# Patient Record
Sex: FEMALE | Race: WHITE
[De-identification: names, ages, dates, MRNs, and addresses within clinical notes are randomized per-mention and may not be internally consistent; named-entity substitution may affect disease eponyms.]

---

## 2018-02-02 ENCOUNTER — HOSPITAL ENCOUNTER (OUTPATIENT)
Dept: HOSPITAL 61 - INTRAD | Age: 73
Discharge: HOME | End: 2018-02-02
Attending: RADIOLOGY
Payer: MEDICARE

## 2018-02-02 ENCOUNTER — HOSPITAL ENCOUNTER (OUTPATIENT)
Dept: HOSPITAL 61 - SURG | Age: 73
Discharge: HOME | End: 2018-02-02
Attending: ANESTHESIOLOGY
Payer: MEDICARE

## 2018-02-02 DIAGNOSIS — E78.5: ICD-10-CM

## 2018-02-02 DIAGNOSIS — Z87.39: ICD-10-CM

## 2018-02-02 DIAGNOSIS — Z86.69: ICD-10-CM

## 2018-02-02 DIAGNOSIS — J44.9: ICD-10-CM

## 2018-02-02 DIAGNOSIS — K21.9: ICD-10-CM

## 2018-02-02 DIAGNOSIS — F32.9: ICD-10-CM

## 2018-02-02 DIAGNOSIS — I95.89: ICD-10-CM

## 2018-02-02 DIAGNOSIS — Z95.5: ICD-10-CM

## 2018-02-02 DIAGNOSIS — Z86.39: ICD-10-CM

## 2018-02-02 DIAGNOSIS — F17.200: ICD-10-CM

## 2018-02-02 DIAGNOSIS — Z79.899: ICD-10-CM

## 2018-02-02 DIAGNOSIS — E78.00: ICD-10-CM

## 2018-02-02 DIAGNOSIS — M48.56XD: Primary | ICD-10-CM

## 2018-02-02 DIAGNOSIS — Z88.8: ICD-10-CM

## 2018-02-02 DIAGNOSIS — M54.5: ICD-10-CM

## 2018-02-02 DIAGNOSIS — Z91.040: ICD-10-CM

## 2018-02-02 DIAGNOSIS — Z90.49: ICD-10-CM

## 2018-02-02 DIAGNOSIS — Z88.1: ICD-10-CM

## 2018-02-02 DIAGNOSIS — I25.10: ICD-10-CM

## 2018-02-02 DIAGNOSIS — M48.56XA: Primary | ICD-10-CM

## 2018-02-02 DIAGNOSIS — I10: ICD-10-CM

## 2018-02-02 DIAGNOSIS — E11.9: ICD-10-CM

## 2018-02-02 DIAGNOSIS — Z79.82: ICD-10-CM

## 2018-02-02 DIAGNOSIS — Z79.01: ICD-10-CM

## 2018-02-02 DIAGNOSIS — Z98.890: ICD-10-CM

## 2018-02-02 DIAGNOSIS — E11.42: ICD-10-CM

## 2018-02-02 DIAGNOSIS — K57.30: ICD-10-CM

## 2018-02-02 DIAGNOSIS — M47.896: ICD-10-CM

## 2018-02-02 DIAGNOSIS — Z90.710: ICD-10-CM

## 2018-02-02 LAB
ADD MAN DIFF?: NO
BASO #: 0.1 X10^3/UL (ref 0–0.2)
BASO %: 1 % (ref 0–3)
EOS #: 0.2 X10^3/UL (ref 0–0.7)
EOS %: 2 % (ref 0–3)
HCG SERPL-ACNC: 7.5 X10^3/UL (ref 4–11)
HEMATOCRIT: 42 % (ref 36–47)
HEMOGLOBIN: 14 G/DL (ref 12–15.5)
INR: 1 (ref 0.8–1.1)
LYMPH #: 2.9 X10^3/UL (ref 1–4.8)
LYMPH %: 38 % (ref 24–48)
MEAN CORPUSCULAR HEMOGLOBIN: 30 PG (ref 25–35)
MEAN CORPUSCULAR HGB CONC: 33 G/DL (ref 31–37)
MEAN CORPUSCULAR VOLUME: 90 FL (ref 79–100)
MONO #: 0.8 X10^3/UL (ref 0–1.1)
MONO %: 10 % (ref 0–9)
NEUT #: 3.6 X10^3UL (ref 1.8–7.7)
NEUT %: 48 % (ref 31–73)
PLATELET COUNT: 191 X10^3/UL (ref 140–400)
PROTHROMBIN TIME PATIENT: 12.6 SEC (ref 11.7–14)
RED BLOOD COUNT: 4.69 X10^6/UL (ref 3.5–5.4)
RED CELL DISTRIBUTION WIDTH: 13.1 % (ref 11.5–14.5)

## 2018-02-02 PROCEDURE — 99152 MOD SED SAME PHYS/QHP 5/>YRS: CPT

## 2018-02-02 PROCEDURE — 22514 PERQ VERTEBRAL AUGMENTATION: CPT

## 2018-02-02 PROCEDURE — 36415 COLL VENOUS BLD VENIPUNCTURE: CPT

## 2018-02-02 PROCEDURE — 85025 COMPLETE CBC W/AUTO DIFF WBC: CPT

## 2018-02-02 PROCEDURE — 85610 PROTHROMBIN TIME: CPT

## 2018-02-02 PROCEDURE — 99153 MOD SED SAME PHYS/QHP EA: CPT

## 2018-02-02 RX ADMIN — IOHEXOL 1 ML: 240 INJECTION, SOLUTION INTRATHECAL; INTRAVASCULAR; INTRAVENOUS; ORAL at 12:45

## 2018-02-02 RX ADMIN — SODIUM CHLORIDE, SODIUM LACTATE, POTASSIUM CHLORIDE, AND CALCIUM CHLORIDE 1 MLS/HR: .6; .31; .03; .02 INJECTION, SOLUTION INTRAVENOUS at 07:00

## 2018-02-02 RX ADMIN — VANCOMYCIN HYDROCHLORIDE 1 MLS/HR: 1 INJECTION, POWDER, FOR SOLUTION INTRAVENOUS at 12:11

## 2018-02-02 RX ADMIN — LIDOCAINE HYDROCHLORIDE 1 ML: 10 INJECTION, SOLUTION INFILTRATION; PERINEURAL at 12:45

## 2018-04-05 ENCOUNTER — HOSPITAL ENCOUNTER (OUTPATIENT)
Dept: HOSPITAL 61 - KCIC MRI | Age: 73
Discharge: HOME | End: 2018-04-05
Payer: MEDICARE

## 2018-04-05 DIAGNOSIS — M48.061: ICD-10-CM

## 2018-04-05 DIAGNOSIS — M25.78: ICD-10-CM

## 2018-04-05 DIAGNOSIS — M48.56XD: Primary | ICD-10-CM

## 2018-04-05 DIAGNOSIS — M47.896: ICD-10-CM

## 2018-04-05 DIAGNOSIS — M51.37: ICD-10-CM

## 2018-04-05 DIAGNOSIS — Z91.81: ICD-10-CM

## 2018-04-05 PROCEDURE — 72148 MRI LUMBAR SPINE W/O DYE: CPT

## 2018-09-13 ENCOUNTER — HOSPITAL ENCOUNTER (INPATIENT)
Dept: HOSPITAL 61 - 2 NORTH | Age: 73
LOS: 5 days | Discharge: HOME | DRG: 246 | End: 2018-09-18
Attending: FAMILY MEDICINE | Admitting: FAMILY MEDICINE
Payer: MEDICARE

## 2018-09-13 VITALS — DIASTOLIC BLOOD PRESSURE: 62 MMHG | SYSTOLIC BLOOD PRESSURE: 140 MMHG

## 2018-09-13 VITALS — WEIGHT: 181.25 LBS | HEIGHT: 63 IN | BODY MASS INDEX: 32.11 KG/M2

## 2018-09-13 VITALS — DIASTOLIC BLOOD PRESSURE: 77 MMHG | SYSTOLIC BLOOD PRESSURE: 170 MMHG

## 2018-09-13 DIAGNOSIS — M19.90: ICD-10-CM

## 2018-09-13 DIAGNOSIS — F32.9: ICD-10-CM

## 2018-09-13 DIAGNOSIS — J96.10: ICD-10-CM

## 2018-09-13 DIAGNOSIS — N18.9: ICD-10-CM

## 2018-09-13 DIAGNOSIS — J44.9: ICD-10-CM

## 2018-09-13 DIAGNOSIS — Z88.0: ICD-10-CM

## 2018-09-13 DIAGNOSIS — Z90.89: ICD-10-CM

## 2018-09-13 DIAGNOSIS — Z91.040: ICD-10-CM

## 2018-09-13 DIAGNOSIS — I50.21: ICD-10-CM

## 2018-09-13 DIAGNOSIS — E46: ICD-10-CM

## 2018-09-13 DIAGNOSIS — I25.2: ICD-10-CM

## 2018-09-13 DIAGNOSIS — Z88.8: ICD-10-CM

## 2018-09-13 DIAGNOSIS — E78.5: ICD-10-CM

## 2018-09-13 DIAGNOSIS — Z95.5: ICD-10-CM

## 2018-09-13 DIAGNOSIS — R79.1: ICD-10-CM

## 2018-09-13 DIAGNOSIS — I25.110: Primary | ICD-10-CM

## 2018-09-13 DIAGNOSIS — I12.9: ICD-10-CM

## 2018-09-13 DIAGNOSIS — Z90.710: ICD-10-CM

## 2018-09-13 DIAGNOSIS — E66.01: ICD-10-CM

## 2018-09-13 DIAGNOSIS — F17.210: ICD-10-CM

## 2018-09-13 DIAGNOSIS — K21.9: ICD-10-CM

## 2018-09-13 DIAGNOSIS — Z90.49: ICD-10-CM

## 2018-09-13 LAB — PROTHROMBIN TIME: 12 SEC (ref 11.7–14)

## 2018-09-13 PROCEDURE — 78582 LUNG VENTILAT&PERFUS IMAGING: CPT

## 2018-09-13 PROCEDURE — 82962 GLUCOSE BLOOD TEST: CPT

## 2018-09-13 PROCEDURE — 78452 HT MUSCLE IMAGE SPECT MULT: CPT

## 2018-09-13 PROCEDURE — 36415 COLL VENOUS BLD VENIPUNCTURE: CPT

## 2018-09-13 PROCEDURE — 92928 PRQ TCAT PLMT NTRAC ST 1 LES: CPT

## 2018-09-13 PROCEDURE — 99152 MOD SED SAME PHYS/QHP 5/>YRS: CPT

## 2018-09-13 PROCEDURE — 96376 TX/PRO/DX INJ SAME DRUG ADON: CPT

## 2018-09-13 PROCEDURE — C1725 CATH, TRANSLUMIN NON-LASER: HCPCS

## 2018-09-13 PROCEDURE — 80048 BASIC METABOLIC PNL TOTAL CA: CPT

## 2018-09-13 PROCEDURE — 85520 HEPARIN ASSAY: CPT

## 2018-09-13 PROCEDURE — 93970 EXTREMITY STUDY: CPT

## 2018-09-13 PROCEDURE — G0269 OCCLUSIVE DEVICE IN VEIN ART: HCPCS

## 2018-09-13 PROCEDURE — A9500 TC99M SESTAMIBI: HCPCS

## 2018-09-13 PROCEDURE — 94640 AIRWAY INHALATION TREATMENT: CPT

## 2018-09-13 PROCEDURE — 84443 ASSAY THYROID STIM HORMONE: CPT

## 2018-09-13 PROCEDURE — 99153 MOD SED SAME PHYS/QHP EA: CPT

## 2018-09-13 PROCEDURE — C1892 INTRO/SHEATH,FIXED,PEEL-AWAY: HCPCS

## 2018-09-13 PROCEDURE — 93458 L HRT ARTERY/VENTRICLE ANGIO: CPT

## 2018-09-13 PROCEDURE — C1771 REP DEV, URINARY, W/SLING: HCPCS

## 2018-09-13 PROCEDURE — 80061 LIPID PANEL: CPT

## 2018-09-13 PROCEDURE — 80053 COMPREHEN METABOLIC PANEL: CPT

## 2018-09-13 PROCEDURE — 83735 ASSAY OF MAGNESIUM: CPT

## 2018-09-13 PROCEDURE — 85610 PROTHROMBIN TIME: CPT

## 2018-09-13 PROCEDURE — 93005 ELECTROCARDIOGRAM TRACING: CPT

## 2018-09-13 PROCEDURE — C1769 GUIDE WIRE: HCPCS

## 2018-09-13 PROCEDURE — C1887 CATHETER, GUIDING: HCPCS

## 2018-09-13 PROCEDURE — 96375 TX/PRO/DX INJ NEW DRUG ADDON: CPT

## 2018-09-13 PROCEDURE — A9558 XE133 XENON 10MCI: HCPCS

## 2018-09-13 PROCEDURE — 85025 COMPLETE CBC W/AUTO DIFF WBC: CPT

## 2018-09-13 PROCEDURE — 84484 ASSAY OF TROPONIN QUANT: CPT

## 2018-09-13 PROCEDURE — 94760 N-INVAS EAR/PLS OXIMETRY 1: CPT

## 2018-09-13 PROCEDURE — 71275 CT ANGIOGRAPHY CHEST: CPT

## 2018-09-13 PROCEDURE — 93017 CV STRESS TEST TRACING ONLY: CPT

## 2018-09-13 PROCEDURE — A9540 TC99M MAA: HCPCS

## 2018-09-13 PROCEDURE — 96374 THER/PROPH/DIAG INJ IV PUSH: CPT

## 2018-09-13 PROCEDURE — 93306 TTE W/DOPPLER COMPLETE: CPT

## 2018-09-13 RX ADMIN — CARVEDILOL SCH MG: 6.25 TABLET, FILM COATED ORAL at 18:16

## 2018-09-13 RX ADMIN — GABAPENTIN SCH MG: 300 CAPSULE ORAL at 21:34

## 2018-09-13 RX ADMIN — SENNOSIDES AND DOCUSATE SODIUM SCH TAB: 8.6; 5 TABLET ORAL at 21:36

## 2018-09-13 RX ADMIN — TRAZODONE HYDROCHLORIDE SCH MG: 100 TABLET ORAL at 21:35

## 2018-09-13 RX ADMIN — FLUTICASONE PROPIONATE SCH SPRAY: 50 SPRAY, METERED NASAL at 21:34

## 2018-09-13 RX ADMIN — DICLOFENAC SODIUM SCH MG: 25 TABLET, DELAYED RELEASE ORAL at 21:35

## 2018-09-13 RX ADMIN — ATORVASTATIN CALCIUM SCH MG: 40 TABLET, FILM COATED ORAL at 21:36

## 2018-09-13 RX ADMIN — IPRATROPIUM BROMIDE AND ALBUTEROL SULFATE SCH ML: .5; 3 SOLUTION RESPIRATORY (INHALATION) at 19:46

## 2018-09-13 RX ADMIN — BACITRACIN SCH MLS/HR: 5000 INJECTION, POWDER, FOR SOLUTION INTRAMUSCULAR at 18:17

## 2018-09-13 RX ADMIN — DOCUSATE SODIUM SCH MG: 100 CAPSULE, LIQUID FILLED ORAL at 21:35

## 2018-09-13 RX ADMIN — NITROGLYCERIN SCH INCH: 20 OINTMENT TOPICAL at 18:17

## 2018-09-13 RX ADMIN — NITROGLYCERIN SCH INCH: 20 OINTMENT TOPICAL at 23:35

## 2018-09-13 NOTE — RAD
Ultrasound venous Doppler

 

INDICATION:LEFT CALF PAIN

 

TECHNIQUE: Grayscale, color Doppler and spectral waveform ultrasound 

images of the bilateral lower extremities deep veins obtained.

 

COMPARISON: None

 

FINDINGS:

The interrogated deep veins are compressible and demonstrate evidence of 

blood flow with normal respiratory variation and response to augmentation.

 

IMPRESSION:

No sonographic evidence of acute DVT of the bilateral lower extremity deep

veins.

 

Electronically signed by: Jeffrey Rm DO (9/13/2018 7:05 PM) [facility]

## 2018-09-13 NOTE — PDOC1
History and Physical


Date of Admission


Date of Admission


DATE: 9/13/18 


TIME: 17:19





Identification/Chief Complaint


Chief Complaint


seen at [facility] ER with chest tightness x 1 week, has known hx CAD/ STENTS, 

followed by [facility] cardiology.





still smokes lightly, hx hx asthma/ copd, transferred here for cardiology eval, 

stat v/q, and doppler of legs





notes pain radiates to neck, is better at this time, some pain in left calf, no 

known hx dvt's





Past Medical History


Cardiovascular:  CAD, HTN, MI


GI:  GERD


Musculoskeletal:  Osteoarthritis


Renal/:  Chronic renal insuff





Past Surgical History


Past Surgical History:  Appendectomy, Cholecystectomy, Tonsillectomy, 

Hysterectomy





Family History


Family History:  High Cholestrol





Social History


Smoke:  <1 pack per day


ALCOHOL:  occassional


Drugs:  None





Current Medications


Current Medications





Active Scripts


Active


Reported


Duoneb 0.5-3(2.5) Mg/3 Ml (Albuterol/Ipratropium) 3 Ml Ampul.neb 3 Ml NEB TID 

PRN PRN


Diphenhydramine Hcl 50 Mg Capsule 25 Mg PO Q4-6HRS PRN


Carvedilol 6.25 Mg Tablet 1 Tab PO BID


Vitamin D3 (Cholecalciferol (Vitamin D3)) 1,000 Unit Tablet 2,000 Unit PO DAILY


Vitamin B-12 (Cyanocobalamin (Vitamin B-12)) 1,000 Mcg Tablet 5,000 Mcg PO DAILY


Omeprazole 40 Mg Capsule.dr 40 Mg PO DAILY


Atorvastatin Calcium 80 Mg Tablet 80 Mg PO HS


Fluticasone Propionate Nasal Spray (Fluticasone Propionate) 16 Gm Spray.susp 1 

Spray NS BID


Trazodone Hcl 100 Mg Tablet 1 Tab PO QHS


Spiriva (Tiotropium Bromide) 18 Mcg Cap.w.dev 1 Cap IH DAILY


Losartan Potassium 50 Mg Tablet 50 Mg PO DAILY


Gabapentin 600 Mg Tablet 600 Mg PO TID


Celexa (Citalopram Hydrobromide) 10 Mg Tablet 1 Tab PO DAILY


Aspir 81 (Aspirin) 81 Mg Tablet.dr 1 Tab PO DAILY





Allergies


Allergies:  


Coded Allergies:  


     ampicillin (Verified  Allergy, Intermediate, 3/16/16)


     cyclobenzaprine (Verified  Allergy, Intermediate, 3/16/16)


     latex (Verified  Allergy, Intermediate, 3/16/16)


     pravastatin (Verified  Allergy, Intermediate, 1/25/18)





ROS


Review of System


14 pt ros otherwise neg


General:  No: Chills, Night Sweats, Fatigue, Malaise, Appetite, Other


PSYCHOLOGICAL ROS:  No: Anxiety, Behavioral Disorder, Concentration difficultie

, Decreased libido, Depression, Disorientation, Hallucinations, Hostility, 

Irritablity, Memory difficulties, Mood Swings, Obsessive thoughts, Physical 

abuse, Sexual abuse, Sleep disturbances, Suicidal ideation, Other


Eyes:  No Blurry vision, No Decreased vision, No Double vision, No Dry eyes, No 

Excessive tearing, No Eye Pain, No Itchy Eyes, No Loss of vision, No Photophobia

, No Scotomata, No Uses contacts, No Uses glasses, No Other


HEENT:  YES: Hearing change; 


   No: Heacaches, Visual Changes, Nasal congestion, Nasal discharge, Oral 

lesions, Sinus pain, Sore Throat, Epistaxis, Sneezing, Snoring, Tinnitus, 

Vertigo, Vocal changes, Other


ALLERGY AND IMMUNOLOGY:  No: Hives, Insect Bite Sensitivity, Itchy/Watery Eyes, 

Nasal Congestion, Post Nasal Drip, Seasonal Allergies, Other


Hematological and Lymphatic:  No: Bleeding Problems, Blood Clots, Blood 

Transfusions, Brusing, Night Sweats, Pallor, Swollen Lymph Nodes, Other


ENDOCRINE:  No: Breast Changes, Galactorrhea, Hair Pattern Changes, Hot Flashes

, Malaise/lethargy, Mood Swings, Palpitations, Polydipsia/polyuria, Skin Changes

, Temperature Intolerance, Unexpected Weight Changes, Other


Respiratory:  YES: Cough, Shortness of breath, SOB with excertion


Cardiovascular:  yes Chest Pain


Musculoskeletal:  Yes Joint Stiffness


Skin:  No Dry Skin, No Eczema, No Hair Changes, No Lumps, No Mole Changes, No 

Mottling, No Nail Changes, No Pruritus, No Rash, No Skin Lesion Changes, No 

Other, No Acne





Physical Exam


General:  Alert, Oriented X3, Cooperative


HEENT:  Atraumatic, PERRLA


Lungs:  Clear to auscultation


Heart:  S1S2, RRR


Breasts:  Not examined


Abdomen:  Normal bowel sounds, Soft


Rectal Exam:  not examined


PELVIC:  Examination not indicated


Extremities:  No clubbing, No cyanosis, Other (trace left leg edema)


Neuro:  Normal gait, Normal speech, Cranial nerves 3-12 NL


Psych/Mental Status:  Mental status NL, Mood NL





VTE Prophylaxis Ordered


VTE Prophylaxis Devices:  Yes


VTE Pharmacological Prophylaxi:  Yes





Assessment/Plan


Assessment/Plan


impression





1. chest pain with known CAD


2. HTN


3, GERD


4. COPD


5. HX ASTHMA


6. HX depression


7. elevated d-dimer








plan


1. cvc admit


2. cardiology consult


3. stat vq scan


4, statin


5. sq lovenox 70mg sq bid


6, gi prophylaxis


7. jorge troponin i


8. stat venous doppler both legs











ELISHA SCOTT MD Sep 13, 2018 17:20

## 2018-09-13 NOTE — RAD
Indication: Chest pain for one week. Positive d-dimer.

 

TECHNIQUE: Nuclear medicine VQ scan with 20 mCi of xenon-133 for 

ventilation imaging and 6.3 mCi of technetium 99m MAA for perfusion 

imaging.

 

COMPARISON: None

 

FINDINGS:

The ventilation exam demonstrates homogeneous distribution of the 

xenon-133 with appropriate washout and washout.

Multiple wedge-shaped areas of ventilation/perfusion mismatch is seen in 

both lungs.

 

IMPRESSION:

High probability VQ scan. CT angiogram of the chest is strongly 

recommended.

 

Electronically signed by: Jeffrey Rm DO (9/13/2018 8:56 PM) [facility]

## 2018-09-14 VITALS — DIASTOLIC BLOOD PRESSURE: 76 MMHG | SYSTOLIC BLOOD PRESSURE: 154 MMHG

## 2018-09-14 VITALS — DIASTOLIC BLOOD PRESSURE: 52 MMHG | SYSTOLIC BLOOD PRESSURE: 123 MMHG

## 2018-09-14 VITALS — DIASTOLIC BLOOD PRESSURE: 72 MMHG | SYSTOLIC BLOOD PRESSURE: 173 MMHG

## 2018-09-14 VITALS — DIASTOLIC BLOOD PRESSURE: 77 MMHG | SYSTOLIC BLOOD PRESSURE: 181 MMHG

## 2018-09-14 VITALS — DIASTOLIC BLOOD PRESSURE: 51 MMHG | SYSTOLIC BLOOD PRESSURE: 111 MMHG

## 2018-09-14 VITALS — SYSTOLIC BLOOD PRESSURE: 100 MMHG | DIASTOLIC BLOOD PRESSURE: 41 MMHG

## 2018-09-14 VITALS — SYSTOLIC BLOOD PRESSURE: 99 MMHG | DIASTOLIC BLOOD PRESSURE: 43 MMHG

## 2018-09-14 VITALS — SYSTOLIC BLOOD PRESSURE: 145 MMHG | DIASTOLIC BLOOD PRESSURE: 37 MMHG

## 2018-09-14 LAB
ALBUMIN SERPL-MCNC: 2.6 G/DL (ref 3.4–5)
ALBUMIN SERPL-MCNC: 3.1 G/DL (ref 3.4–5)
ALBUMIN/GLOB SERPL: 0.8 {RATIO} (ref 1–1.7)
ALBUMIN/GLOB SERPL: 0.9 {RATIO} (ref 1–1.7)
ALP SERPL-CCNC: 77 U/L (ref 46–116)
ALP SERPL-CCNC: 90 U/L (ref 46–116)
ALT SERPL-CCNC: 25 U/L (ref 14–59)
ALT SERPL-CCNC: 29 U/L (ref 14–59)
ANION GAP SERPL CALC-SCNC: 4 MMOL/L (ref 6–14)
ANION GAP SERPL CALC-SCNC: 5 MMOL/L (ref 6–14)
AST SERPL-CCNC: 17 U/L (ref 15–37)
AST SERPL-CCNC: 24 U/L (ref 15–37)
BASOPHILS # BLD AUTO: 0.1 X10^3/UL (ref 0–0.2)
BASOPHILS NFR BLD: 1 % (ref 0–3)
BILIRUB SERPL-MCNC: 0.2 MG/DL (ref 0.2–1)
BILIRUB SERPL-MCNC: 0.3 MG/DL (ref 0.2–1)
BUN SERPL-MCNC: 21 MG/DL (ref 7–20)
BUN SERPL-MCNC: 23 MG/DL (ref 7–20)
BUN/CREAT SERPL: 21 (ref 6–20)
BUN/CREAT SERPL: 23 (ref 6–20)
CALCIUM SERPL-MCNC: 8.5 MG/DL (ref 8.5–10.1)
CALCIUM SERPL-MCNC: 8.8 MG/DL (ref 8.5–10.1)
CHLORIDE SERPL-SCNC: 105 MMOL/L (ref 98–107)
CHLORIDE SERPL-SCNC: 106 MMOL/L (ref 98–107)
CHOLEST SERPL-MCNC: 176 MG/DL (ref 0–200)
CHOLEST/HDLC SERPL: 4.5 {RATIO}
CO2 SERPL-SCNC: 32 MMOL/L (ref 21–32)
CO2 SERPL-SCNC: 34 MMOL/L (ref 21–32)
CREAT SERPL-MCNC: 1 MG/DL (ref 0.6–1)
CREAT SERPL-MCNC: 1 MG/DL (ref 0.6–1)
EOSINOPHIL NFR BLD: 0.3 X10^3/UL (ref 0–0.7)
EOSINOPHIL NFR BLD: 3 % (ref 0–3)
ERYTHROCYTE [DISTWIDTH] IN BLOOD BY AUTOMATED COUNT: 13.3 % (ref 11.5–14.5)
GFR SERPLBLD BASED ON 1.73 SQ M-ARVRAT: 54.7 ML/MIN
GFR SERPLBLD BASED ON 1.73 SQ M-ARVRAT: 54.7 ML/MIN
GLOBULIN SER-MCNC: 3.4 G/DL (ref 2.2–3.8)
GLOBULIN SER-MCNC: 3.5 G/DL (ref 2.2–3.8)
GLUCOSE SERPL-MCNC: 131 MG/DL (ref 70–99)
GLUCOSE SERPL-MCNC: 141 MG/DL (ref 70–99)
HCT VFR BLD CALC: 35.6 % (ref 36–47)
HDLC SERPL-MCNC: 39 MG/DL (ref 40–60)
HGB BLD-MCNC: 12 G/DL (ref 12–15.5)
LDLC: 98 MG/DL (ref 0–100)
LYMPHOCYTES # BLD: 4 X10^3/UL (ref 1–4.8)
LYMPHOCYTES NFR BLD AUTO: 43 % (ref 24–48)
MCH RBC QN AUTO: 31 PG (ref 25–35)
MCHC RBC AUTO-ENTMCNC: 34 G/DL (ref 31–37)
MCV RBC AUTO: 91 FL (ref 79–100)
MONO #: 1 X10^3/UL (ref 0–1.1)
MONOCYTES NFR BLD: 10 % (ref 0–9)
NEUT #: 4 X10^3UL (ref 1.8–7.7)
NEUTROPHILS NFR BLD AUTO: 42 % (ref 31–73)
PLATELET # BLD AUTO: 202 X10^3/UL (ref 140–400)
POTASSIUM SERPL-SCNC: 4.8 MMOL/L (ref 3.5–5.1)
POTASSIUM SERPL-SCNC: 4.8 MMOL/L (ref 3.5–5.1)
PROT SERPL-MCNC: 6 G/DL (ref 6.4–8.2)
PROT SERPL-MCNC: 6.6 G/DL (ref 6.4–8.2)
RBC # BLD AUTO: 3.92 X10^6/UL (ref 3.5–5.4)
SODIUM SERPL-SCNC: 142 MMOL/L (ref 136–145)
SODIUM SERPL-SCNC: 144 MMOL/L (ref 136–145)
TRIGL SERPL-MCNC: 193 MG/DL (ref 0–150)
VLDLC: 39 MG/DL (ref 0–40)
WBC # BLD AUTO: 9.4 X10^3/UL (ref 4–11)

## 2018-09-14 PROCEDURE — B2111ZZ FLUOROSCOPY OF MULTIPLE CORONARY ARTERIES USING LOW OSMOLAR CONTRAST: ICD-10-PCS | Performed by: INTERNAL MEDICINE

## 2018-09-14 PROCEDURE — B2151ZZ FLUOROSCOPY OF LEFT HEART USING LOW OSMOLAR CONTRAST: ICD-10-PCS | Performed by: INTERNAL MEDICINE

## 2018-09-14 PROCEDURE — 4A023N7 MEASUREMENT OF CARDIAC SAMPLING AND PRESSURE, LEFT HEART, PERCUTANEOUS APPROACH: ICD-10-PCS | Performed by: INTERNAL MEDICINE

## 2018-09-14 RX ADMIN — LOSARTAN POTASSIUM SCH MG: 50 TABLET ORAL at 09:00

## 2018-09-14 RX ADMIN — BACITRACIN SCH MLS/HR: 5000 INJECTION, POWDER, FOR SOLUTION INTRAMUSCULAR at 03:30

## 2018-09-14 RX ADMIN — GABAPENTIN SCH MG: 300 CAPSULE ORAL at 20:35

## 2018-09-14 RX ADMIN — HYDROCODONE BITARTRATE AND ACETAMINOPHEN PRN TAB: 5; 325 TABLET ORAL at 20:36

## 2018-09-14 RX ADMIN — MORPHINE SULFATE PRN MG: 2 INJECTION, SOLUTION INTRAMUSCULAR; INTRAVENOUS at 01:56

## 2018-09-14 RX ADMIN — NITROGLYCERIN SCH INCH: 20 OINTMENT TOPICAL at 22:59

## 2018-09-14 RX ADMIN — NITROGLYCERIN SCH INCH: 20 OINTMENT TOPICAL at 06:20

## 2018-09-14 RX ADMIN — MORPHINE SULFATE PRN MG: 2 INJECTION, SOLUTION INTRAMUSCULAR; INTRAVENOUS at 18:36

## 2018-09-14 RX ADMIN — CITALOPRAM HYDROBROMIDE SCH MG: 10 TABLET ORAL at 10:22

## 2018-09-14 RX ADMIN — GABAPENTIN SCH MG: 300 CAPSULE ORAL at 10:18

## 2018-09-14 RX ADMIN — VITAMIN D, TAB 1000IU (100/BT) SCH UNIT: 25 TAB at 10:22

## 2018-09-14 RX ADMIN — CYANOCOBALAMIN TAB 1000 MCG SCH MCG: 1000 TAB at 10:21

## 2018-09-14 RX ADMIN — TRAZODONE HYDROCHLORIDE SCH MG: 100 TABLET ORAL at 20:35

## 2018-09-14 RX ADMIN — MORPHINE SULFATE PRN MG: 2 INJECTION, SOLUTION INTRAMUSCULAR; INTRAVENOUS at 22:59

## 2018-09-14 RX ADMIN — ZOLPIDEM TARTRATE PRN MG: 5 TABLET ORAL at 20:35

## 2018-09-14 RX ADMIN — DOCUSATE SODIUM SCH MG: 100 CAPSULE, LIQUID FILLED ORAL at 10:21

## 2018-09-14 RX ADMIN — MORPHINE SULFATE PRN MG: 2 INJECTION, SOLUTION INTRAMUSCULAR; INTRAVENOUS at 14:30

## 2018-09-14 RX ADMIN — ATORVASTATIN CALCIUM SCH MG: 40 TABLET, FILM COATED ORAL at 20:35

## 2018-09-14 RX ADMIN — NITROGLYCERIN SCH INCH: 20 OINTMENT TOPICAL at 12:00

## 2018-09-14 RX ADMIN — GABAPENTIN SCH MG: 300 CAPSULE ORAL at 14:00

## 2018-09-14 RX ADMIN — ENALAPRILAT PRN MG: 1.25 INJECTION, SOLUTION INTRAVENOUS at 03:30

## 2018-09-14 RX ADMIN — SENNOSIDES AND DOCUSATE SODIUM SCH TAB: 8.6; 5 TABLET ORAL at 10:21

## 2018-09-14 RX ADMIN — IPRATROPIUM BROMIDE AND ALBUTEROL SULFATE SCH ML: .5; 3 SOLUTION RESPIRATORY (INHALATION) at 07:28

## 2018-09-14 RX ADMIN — SENNOSIDES AND DOCUSATE SODIUM SCH TAB: 8.6; 5 TABLET ORAL at 20:39

## 2018-09-14 RX ADMIN — BACITRACIN SCH MLS/HR: 5000 INJECTION, POWDER, FOR SOLUTION INTRAMUSCULAR at 20:39

## 2018-09-14 RX ADMIN — FLUTICASONE PROPIONATE SCH SPRAY: 50 SPRAY, METERED NASAL at 20:34

## 2018-09-14 RX ADMIN — CARVEDILOL SCH MG: 6.25 TABLET, FILM COATED ORAL at 17:52

## 2018-09-14 RX ADMIN — NITROGLYCERIN SCH INCH: 20 OINTMENT TOPICAL at 17:52

## 2018-09-14 RX ADMIN — ENOXAPARIN SODIUM SCH MG: 40 INJECTION SUBCUTANEOUS at 20:36

## 2018-09-14 RX ADMIN — MORPHINE SULFATE PRN MG: 2 INJECTION, SOLUTION INTRAMUSCULAR; INTRAVENOUS at 03:29

## 2018-09-14 RX ADMIN — ASPIRIN SCH MG: 81 TABLET, COATED ORAL at 10:22

## 2018-09-14 RX ADMIN — IPRATROPIUM BROMIDE AND ALBUTEROL SULFATE SCH ML: .5; 3 SOLUTION RESPIRATORY (INHALATION) at 12:05

## 2018-09-14 RX ADMIN — BACITRACIN SCH MLS/HR: 5000 INJECTION, POWDER, FOR SOLUTION INTRAMUSCULAR at 13:21

## 2018-09-14 RX ADMIN — IPRATROPIUM BROMIDE AND ALBUTEROL SULFATE SCH ML: .5; 3 SOLUTION RESPIRATORY (INHALATION) at 19:05

## 2018-09-14 RX ADMIN — FLUTICASONE PROPIONATE SCH SPRAY: 50 SPRAY, METERED NASAL at 10:22

## 2018-09-14 RX ADMIN — IPRATROPIUM BROMIDE AND ALBUTEROL SULFATE SCH ML: .5; 3 SOLUTION RESPIRATORY (INHALATION) at 17:00

## 2018-09-14 RX ADMIN — CALCIUM SCH MG: 500 TABLET ORAL at 10:18

## 2018-09-14 RX ADMIN — DOCUSATE SODIUM SCH MG: 100 CAPSULE, LIQUID FILLED ORAL at 20:34

## 2018-09-14 RX ADMIN — CARVEDILOL SCH MG: 6.25 TABLET, FILM COATED ORAL at 10:20

## 2018-09-14 RX ADMIN — DICLOFENAC SODIUM SCH MG: 25 TABLET, DELAYED RELEASE ORAL at 20:35

## 2018-09-14 RX ADMIN — PANTOPRAZOLE SODIUM SCH MG: 40 TABLET, DELAYED RELEASE ORAL at 10:21

## 2018-09-14 RX ADMIN — ONDANSETRON PRN MG: 2 INJECTION INTRAMUSCULAR; INTRAVENOUS at 01:56

## 2018-09-14 RX ADMIN — DICLOFENAC SODIUM SCH MG: 25 TABLET, DELAYED RELEASE ORAL at 10:22

## 2018-09-14 NOTE — CONS
DATE OF CONSULTATION:  09/14/2018



ATTENDING PHYSICIAN:  Danis Morales MD.



CONSULTING PHYSICIAN:  Michelle Pate MD.



REASON FOR CONSULTATION:  The patient seen in pulmonary consultation at the

request of Dr. Morales for possible PE.



HISTORY OF PRESENT ILLNESS:  The patient is a 73-year-old that presented to the

Emergency Department at [facility] for chest pain.  She was not really

short of breath.  Upon further questioning, she mentioned that she was somewhat

short of breath but was unsure.  She has underlying COPD, normally wears oxygen

at bedtime at 2 liters.



She has been having chest pain on and off for quite some time.  Last night, the

pain was excruciating anteriorly, felt like pressure.  She came in, was

transferred to [facility].



The patient underwent VQ scan, which was read out as high probability.  She had

venous Dopplers, which were negative.  She had a D-dimer, which was elevated.



PAST MEDICAL HISTORY:  COPD, coronary artery disease, previous myocardial

infarction, hypertension, gastroesophageal reflux, esophageal stricture with

previous dilatation, osteoarthritis, chronic renal insufficiency.  No prior

history of DVT or pulmonary embolism.



PAST SURGICAL HISTORY:  Appendectomy, cholecystectomy, tonsillectomy,

hysterectomy, previous esophageal dilatation.



FAMILY HISTORY:  Hyperlipidemia.



SOCIAL HISTORY:  She continues to smoke, occasional use of alcohol.



CURRENT MEDICATIONS:  List was reviewed.



REVIEW OF SYSTEMS:

CONSTITUTIONAL:  No fever or chills.

EYES:  No change in visual acuity.

HENT:  No nasal congestion, no sore throat.

PULMONARY:  As indicated above.

CARDIOVASCULAR:  As indicated above.

GASTROINTESTINAL:  No nausea, vomiting, or diarrhea.

GENITOURINARY:  No dysuria or frequency.

MUSCULOSKELETAL:  No localized muscle aches or joint pain.

SKIN:  No new skin lesions.

NEUROLOGIC:  No headaches, diplopia or blurred vision.



ALLERGIES:  LISTED TO AMPICILLIN, CYCLOBENZAPRINE, LATEX AND PRAVASTATIN.



PHYSICAL EXAMINATION:

GENERAL:  The patient appeared to be of stated age.  She was in no respiratory

distress.

VITAL SIGNS:  Stable.  O2 saturation was greater than 92%, currently on room

air.

HEENT:  Eyes, the sclerae were nonicteric.

NECK:  Jugular venous distention was not elevated.  No lymphadenopathy.

CHEST:  Full expansion.

LUNGS:  Adequate airway flow with no wheezes.

CARDIOVASCULAR:  Regular rate and rhythm with S1, S2, no S3.

ABDOMEN:  Soft, nontender, nondistended.

EXTREMITIES:  No clubbing, cyanosis or edema.

NEUROLOGIC:  The patient was awake, alert, following commands.  A detailed neuro

exam was not performed.



LABORATORY DATA:  Reviewed.  White count was normal.  INR was elevated at [facility].  BUN and creatinine 23 and 1.0 today, albumin was low.



IMPRESSION:

1.  Abnormal VQ scan read out as high probability, my clinical suspicion for

pulmonary embolism is low.  I reviewed the VQ scan.  I did not concur with

current reading, I reviewed it with Dr. Rick Moritz who concurs that if at all

the VQ scan is low probability.

2.  Chronic obstructive pulmonary disease.

3.  Chronic respiratory failure.

4.  Protein malnutrition, present upon admission.

5.  Atypical chest pain, suspect secondary to reflux.

6.  Coronary artery disease with previous myocardial infarction.

7.  Gastroesophageal reflux.



PLAN:

1.  We will complete workup with CT angiogram, venous Dopplers of the lower

extremities were negative:  The patient is okay to discharge home later today if

the above is negative.

2.  Follow up with her pulmonologist in [place].

3.  Follow up with her primary care doctor, Dr. Leach.



I do appreciate the privilege in sharing in the patient's care.

 



______________________________

MICHELLE PATE MD DR:  JOSE/abhishek  JOB#:  [id number] / [id number]

DD:  09/14/2018 10:55  DT:  09/14/2018 16:48



SALMA Matthews MD

## 2018-09-14 NOTE — PDOC2
DINAJAMMIE JIMENEZ APRN 9/14/18 0945:


CARDIAC CONSULT


DATE OF CONSULT


Date of Consult


DATE: 9/14/18 


TIME: 09:41





REASON FOR CONSULT


Reason for Consult:


chest pain





REFERRING PHYSICIAN


Referring Physician:


Andrew





SOURCE


Source:  Chart review, Patient





HISTORY OF PRESENT ILLNESS


HISTORY OF PRESENT ILLNESS


73 year old  female transferred from [facility] after presenting there with a 

one week history of chest heaviness involving the entire thoracic region.  

Associated with dizziness and nausea. Usually starts at night or awakens her 

from sleep.  She has treated with multiple doses of aspirin.  D-Dimer @ [facility] > 1 

but not CT done there due to elevated Cr. LE venous Dopplers negative for DVT; 

VQ scan high probability for PE. Only recent travel was 1.5 hr car trip to 

[facility].  Denies prior history of DVT/PE.  History of CAD with stents placed to 

unknown targets @ [facility].  BP with accelerated HTN


Reason for Visit:  prob pulm embolus





PAST MEDICAL HISTORY


Cardiovascular:  CAD (with prior PCI/stents-2016), HTN, MI, Hyperlipidemia


Pulmonary:  Asthma, COPD


CENTRAL NERVOUS SYSTEM:  Other (none)


GI:  GERD


Heme/Onc:  No pertinent hx


Hepatobiliary:  No pertinent hx


Psych:  No pertinent hx


Musculoskeletal:  No pain


Rheumatologic:  No pertinent hx


Infectious disease:  No pertinent hx


Renal/:  Chronic renal insuff


Endocrine:  No pertinent hx


Dermatology:  No pertinent hx





PAST SURGICAL HISTORY


Past Surgical History:  Appendectomy, Cholecystectomy, Tonsillectomy, 

Hysterectomy





FAMILY HISTORY


Family History:  High Cholestrol





SOCIAL HISTORY


Smoke:  <1 pack per day


ALCOHOL:  social


Drugs:  None


Lives:  with Family





CURRENT MEDICATIONS


CURRENT MEDICATIONS





Current Medications








 Medications


  (Trade)  Dose


 Ordered  Sig/Lisa


 Route


 PRN Reason  Start Time


 Stop Time Status Last Admin


Dose Admin


 


 Aspirin


  (Alexia Aspirin)  325 mg  1X  ONCE


 PO


   9/13/18 17:30


 9/13/18 17:31 DC 9/13/18 18:16





 


 Nitroglycerin


  (Nitro-Bid Oint)  1 inch  Q6HRS


 TP


   9/13/18 18:00


    9/13/18 23:35





 


 Morphine Sulfate


  (Morphine


 Sulfate)  1 mg  PRN Q10MIN  PRN


 IV


 CHEST PAIN  9/13/18 17:30


    9/14/18 03:29





 


 Ondansetron HCl


  (Zofran)  4 mg  PRN Q6HRS  PRN


 IV


 NAUSEA/VOMITING  9/13/18 17:30


    9/14/18 01:56





 


 Sodium Chloride  1,000 ml @ 


 100 mls/hr  Q10H


 IV


   9/13/18 17:21


    9/14/18 03:30





 


 Senna/Docusate


 Sodium


  (Senna Plus)  1 tab  BID


 PO


   9/13/18 21:00


    9/13/18 21:36





 


 Docusate Sodium


  (Colace)  100 mg  BID


 PO


   9/13/18 21:00


    9/13/18 21:35





 


 Carvedilol


  (Coreg)  6.25 mg  BIDWMEALS


 PO


   9/13/18 18:00


    9/13/18 18:16





 


 Fluticasone


 Propionate


  (Flonase)  1 spray  BID


 NS


   9/13/18 21:00


    9/13/18 21:34





 


 Trazodone HCl


  (Desyrel)  100 mg  QHS


 PO


   9/13/18 21:00


    9/13/18 21:35





 


 Atorvastatin


 Calcium


  (Lipitor)  80 mg  QHS


 PO


   9/13/18 21:00


    9/13/18 21:36





 


 Diclofenac Sodium


  (Voltaren)  75 mg  BID


 PO


   9/13/18 21:00


    9/13/18 21:35





 


 Gabapentin


  (Neurontin)  600 mg  TID


 PO


   9/13/18 21:00


    9/13/18 21:34





 


 Albuterol/


 Ipratropium


  (Duoneb)  3 ml  RTQID


 NEB


   9/13/18 20:00


    9/14/18 07:28





 


 Enoxaparin Sodium


  (Lovenox 80mg


 Syringe)  80 mg  Q12HR


 SQ


   9/13/18 19:01


 9/14/18 07:18 DC 9/13/18 18:18





 


 Enalaprilat


  (Vasotec Inj)  1.25 mg  PRN Q6HRS  PRN


 IVP


 HYPERTENSION, SEE COMMENTS  9/14/18 03:15


    9/14/18 03:30














ALLERGIES


ALLERGIES:  


Coded Allergies:  


     ampicillin (Verified  Allergy, Intermediate, 3/16/16)


     cyclobenzaprine (Verified  Allergy, Intermediate, 3/16/16)


     latex (Verified  Allergy, Intermediate, 3/16/16)


     pravastatin (Verified  Allergy, Intermediate, 1/25/18)





ROS


General:  No: Chills, Night Sweats, Fatigue, Malaise, Appetite, Other


PSYCHOLOGICAL ROS:  No: Anxiety, Behavioral Disorder, Concentration difficultie

, Decreased libido, Depression, Disorientation, Hallucinations, Hostility, 

Irritablity, Memory difficulties, Mood Swings, Obsessive thoughts, Physical 

abuse, Sexual abuse, Sleep disturbances, Suicidal ideation, Other


Eyes:  No Blurry vision, No Decreased vision, No Double vision, No Dry eyes, No 

Excessive tearing, No Eye Pain, No Itchy Eyes, No Loss of vision, No Photophobia

, No Scotomata, No Uses contacts, No Uses glasses, No Other


HEENT:  No: Heacaches, Visual Changes, Hearing change, Nasal congestion, Nasal 

discharge, Oral lesions, Sinus pain, Sore Throat, Epistaxis, Sneezing, Snoring, 

Tinnitus, Vertigo, Vocal changes, Other


ENDOCRINE:  No: Breast Changes, Galactorrhea, Hair Pattern Changes, Hot Flashes

, Malaise/lethargy, Mood Swings, Palpitations, Polydipsia/polyuria, Skin Changes

, Temperature Intolerance, Unexpected Weight Changes, Other


Breast:  No New/Changing Breast Lumps, No Nipple changes, No Nipple discharge, 

No Other


Respiratory:  No: Cough, Hemoptysis, Orthopnea, Pleuritic Pain, Shortness of 

breath, SOB with excertion, Sputum Changes, Stridor, Tachypnea, Wheezing, Other


Cardiovascular:  yes Chest Pain


Gastrointestinal:  Yes Nausea, Yes Vomiting


Genitourinary:  No Dysuria, No Frequency, No Incontinence, No Hematuria, No 

Retention, No Discharge, No Urgency, No Pain, No Flank Pain, No Other


Musculoskeletal:  No Gait Disturbance, No Joint Pain, No Joint Stiffness, No 

Joint Swelling, No Muscle Pain, No Muscular Weakness, No Pain In:, No Swelling 

In:, No Other


Neurological:  No Behavorial Changes, No Bowel/Bladder ControlChng, No Confusion

, No Dizziness, No Gait Disturbance, No Headaches, No Impaired Coord/balance, 

No Memory Loss, No Numbness/Tingling, No Seizures, No Speech Problems, No 

Tremors, No Visual Changes, No Weakness, No Other


Skin:  No Dry Skin, No Eczema, No Hair Changes, No Lumps, No Mole Changes, No 

Mottling, No Nail Changes, No Pruritus, No Rash, No Skin Lesion Changes, No 

Other, No Acne





PHYSICAL EXAM


General:  Alert, Oriented X3, Cooperative


HEENT:  Atraumatic, PERRLA


Lungs:  Clear to auscultation


Heart:  Normal S1, Normal S2, No murmurs


Abdomen:  Soft


Extremities:  No edema, Normal pulses


Skin:  No rashes


Neuro:  Normal speech


Psych/Mental Status:  Mental status NL, Mood NL


MUSCULOSKELETAL:  No deformity





VITALS


VITALS





Vital Signs








  Date Time  Temp Pulse Resp B/P (MAP) Pulse Ox O2 Delivery O2 Flow Rate FiO2


 


9/14/18 07:28     95 Room Air  


 


9/14/18 06:15 98.2 65 16 99/43 (61)   2.0 





 98.2       











LABS


Lab:





Laboratory Tests








Test


 9/13/18


17:30 9/13/18


20:20 9/13/18


20:57 9/14/18


02:30


 


Prothrombin Time


 12.0 SEC


(11.7-14.0) 


 


 





 


Prothromb Time International


Ratio 0.9 (0.8-1.1) 


 


 


 





 


Troponin I Quantitative


 < 0.017 ng/mL


(0.000-0.055) < 0.017 ng/mL


(0.000-0.055) 


 < 0.017 ng/mL


(0.000-0.055)


 


Glucose (Fingerstick)


 


 


 135 mg/dL


(70-99) 





 


White Blood Count


 


 


 


 9.4 x10^3/uL


(4.0-11.0)


 


Red Blood Count


 


 


 


 3.92 x10^6/uL


(3.50-5.40)


 


Hemoglobin


 


 


 


 12.0 g/dL


(12.0-15.5)


 


Hematocrit


 


 


 


 35.6 %


(36.0-47.0)


 


Mean Corpuscular Volume    91 fL () 


 


Mean Corpuscular Hemoglobin    31 pg (25-35) 


 


Mean Corpuscular Hemoglobin


Concent 


 


 


 34 g/dL


(31-37)


 


Red Cell Distribution Width


 


 


 


 13.3 %


(11.5-14.5)


 


Platelet Count


 


 


 


 202 x10^3/uL


(140-400)


 


Neutrophils (%) (Auto)    42 % (31-73) 


 


Lymphocytes (%) (Auto)    43 % (24-48) 


 


Monocytes (%) (Auto)    10 % (0-9) 


 


Eosinophils (%) (Auto)    3 % (0-3) 


 


Basophils (%) (Auto)    1 % (0-3) 


 


Neutrophils # (Auto)


 


 


 


 4.0 x10^3uL


(1.8-7.7)


 


Lymphocytes # (Auto)


 


 


 


 4.0 x10^3/uL


(1.0-4.8)


 


Monocytes # (Auto)


 


 


 


 1.0 x10^3/uL


(0.0-1.1)


 


Eosinophils # (Auto)


 


 


 


 0.3 x10^3/uL


(0.0-0.7)


 


Basophils # (Auto)


 


 


 


 0.1 x10^3/uL


(0.0-0.2)


 


Sodium Level


 


 


 


 144 mmol/L


(136-145)


 


Potassium Level


 


 


 


 4.8 mmol/L


(3.5-5.1)


 


Chloride Level


 


 


 


 105 mmol/L


()


 


Carbon Dioxide Level


 


 


 


 34 mmol/L


(21-32)


 


Anion Gap    5 (6-14) 


 


Blood Urea Nitrogen


 


 


 


 23 mg/dL


(7-20)


 


Creatinine


 


 


 


 1.0 mg/dL


(0.6-1.0)


 


Estimated GFR


(Cockcroft-Gault) 


 


 


 54.7 





 


BUN/Creatinine Ratio    23 (6-20) 


 


Glucose Level


 


 


 


 131 mg/dL


(70-99)


 


Calcium Level


 


 


 


 8.8 mg/dL


(8.5-10.1)


 


Total Bilirubin


 


 


 


 0.3 mg/dL


(0.2-1.0)


 


Aspartate Amino Transf


(AST/SGOT) 


 


 


 24 U/L (15-37) 





 


Alanine Aminotransferase


(ALT/SGPT) 


 


 


 29 U/L (14-59) 





 


Alkaline Phosphatase


 


 


 


 90 U/L


()


 


Total Protein


 


 


 


 6.6 g/dL


(6.4-8.2)


 


Albumin


 


 


 


 3.1 g/dL


(3.4-5.0)


 


Albumin/Globulin Ratio    0.9 (1.0-1.7) 


 


Triglycerides Level


 


 


 


 193 mg/dL


(0-150)


 


Cholesterol Level


 


 


 


 176 mg/dL


(0-200)


 


LDL Cholesterol, Calculated


 


 


 


 98 mg/dL


(0-100)


 


VLDL Cholesterol, Calculated


 


 


 


 39 mg/dL


(0-40)


 


Non-HDL Cholesterol Calculated


 


 


 


 137 mg/dL


(0-129)


 


HDL Cholesterol


 


 


 


 39 mg/dL


(40-60)


 


Cholesterol/HDL Ratio    4.5 


 


Thyroid Stimulating Hormone


(TSH) 


 


 


 1.059 uIU/mL


(0.358-3.74)


 


Test


 9/14/18


08:25 


 


 





 


Glucose (Fingerstick)


 117 mg/dL


(70-99) 


 


 














EKG


EKG


SR;  no acute changes





ECHOCARDIOGRAM


ECHOCARDIOGRAM


pending





ASSESSMENT/PLAN


ASSESSMENT/PLAN


1. chest pain 


   --atypical presentation 


   --troponin levels not consistent with AMI


   --EKG without acute changes


   --TTE to evaluate LVEF and assess for WMA


   --request records from [place]





2.  pulmonary embolus


   --high prob VQ scan


   --continue BID lovenox


   --? CTA to confirm; defer to pulm





3.  Asthma/COPD


   --persistent tobacco abuse 


   --defer to pulm





4.  HTN


   --continue usual meds





5.  HLD 


   --check FLP 


   --continue statin therapy





6.  GERD


   --continue home meds





ANNA MALDONADO MD 9/14/18 1812:


CARDIAC CONSULT


ASSESSMENT/PLAN


ASSESSMENT/PLAN


Pt. seen and examined. Agree with above NP note. 


73 y.o woman with prior CAD s/p PCI. 


Denies any recurrent similar symptoms but has diffuse chest pressure with 

features suggestive of unstable angina. 


She also has atypical features in the setting of GERD and prior esophageal 

issues and dysphagia. 


Given significant cardiac history with an abnormal EKG, will plan for cath in 

a.m.


Discussed r/b/a and patient wishes to proceed. Thanks. Will f/u after cath.











JAMMIE MEI Sep 14, 2018 09:45


ANNA MALDONADO MD Sep 14, 2018 18:12

## 2018-09-14 NOTE — RAD
Examination: CT angiography chest

 

HISTORY: History of chest pain, high probability for pulmonary embolism or

a VQ scan

 

COMPARISON: None available

 

Exposure: One or more of the following individualized dose reduction 

techniques were utilized for this examination:  1. Automated exposure 

control  2. Adjustment of the mA and/or kV according to patient size  3. 

Use of iterative reconstruction technique 

 

TECHNIQUE: Axial CT angiographic images were performed with IV contrast. 

Coronal and sagittal 3-D MIP reformats are performed

 

FINDINGS:

 

 

There is a 1 cm hypodense nodule identified in the right lobe of thyroid 

gland.

 

The central airways are patent.

 

Mild cardiomegaly. Coronary artery calcifications.

 

Moderate aortic atherosclerosis.

 

No evidence of filling defect identified in the main pulmonary arterial 

trunk and right and left main pulmonary arteries and the visualized lobar,

segmental branches of the pulmonary arteries. Moderate emphysematous 

changes identified in the lungs. There is a 3 mm nodule identified in the 

right upper lobe of the lung. There is a 4 mm nodule identified in the 

right middle lobe of the lung. Mild left lung base airspace opacities 

likely atelectasis or infiltrate.

 

The visualized liver, spleen, right adrenal grossly appears unremarkable.

 

There is a nodule identified in the left adrenal gland measuring 1.8 cm 

and measuring 14 Hounsfield units.

 

Moderate degenerative changes thoracic spine.

 

 

 

IMPRESSION:

 

 

1. No evidence of pulmonary embolism.

 

2. Small nodules identified in the right upper lobe, right middle lobe of 

lung with the largest measuring 4 mm. Follow-up per Fleischner Society 

guidelines follow-up CT in 6 months.

 

3. Mild left lung base airspace opacity likely atelectasis or infiltrate.

 

4. Moderate emphysematous changes identified in the lungs.

 

5. A 1.8 cm nodule identified in the left adrenal gland likely lipid rich 

adrenal adenoma.

 

6. 1 cm hypodense nodule identified in the right lobe of the thyroid 

gland. Recommend follow-up ultrasound thyroid.

 

 

Electronically signed by: Roberto Merchant MD (9/14/2018 1:50 PM) [id number]

## 2018-09-14 NOTE — EKG
[facility]

              [address] [place] [address]

Test Date:    2018               Test Time:    07:49:43

Pat Name:     PATRICIA BALBUENA              Department:   

Patient ID:   [id number]           Room:         201 1

Gender:       F                        Technician:   

:          [id number]               Requested By: ELISHA SCOTT

Order Number: [id number]           Reading MD:   Coleman Hood MD

                                 Measurements

Intervals                              Axis          

Rate:         60                       P:            

NE:                                    QRS:          -32

QRSD:         82                       T:            15

QT:           454                                    

QTc:          459                                    

                           Interpretive Statements

SR

PRIOR INFERIOR INFARCT

Electronically Signed On 2018 12:28:35 CDT by Coleman Hood MD

## 2018-09-14 NOTE — PDOC
PULMONARY PROGRESS NOTES


Vitals





Vital Signs








  Date Time  Temp Pulse Resp B/P (MAP) Pulse Ox O2 Delivery O2 Flow Rate FiO2


 


9/14/18 10:20  63  111/51    


 


9/14/18 07:28     95 Room Air  


 


9/14/18 06:15 98.2  16    2.0 





 98.2       








Lungs:  Clear


Labs





Laboratory Tests








Test


 9/13/18


17:30 9/13/18


20:20 9/13/18


20:57 9/14/18


02:30


 


Prothrombin Time


 12.0 SEC


(11.7-14.0) 


 


 





 


Prothromb Time International


Ratio 0.9 (0.8-1.1) 


 


 


 





 


Troponin I Quantitative


 < 0.017 ng/mL


(0.000-0.055) < 0.017 ng/mL


(0.000-0.055) 


 < 0.017 ng/mL


(0.000-0.055)


 


Glucose (Fingerstick)


 


 


 135 mg/dL


(70-99) 





 


White Blood Count


 


 


 


 9.4 x10^3/uL


(4.0-11.0)


 


Red Blood Count


 


 


 


 3.92 x10^6/uL


(3.50-5.40)


 


Hemoglobin


 


 


 


 12.0 g/dL


(12.0-15.5)


 


Hematocrit


 


 


 


 35.6 %


(36.0-47.0)


 


Mean Corpuscular Volume    91 fL () 


 


Mean Corpuscular Hemoglobin    31 pg (25-35) 


 


Mean Corpuscular Hemoglobin


Concent 


 


 


 34 g/dL


(31-37)


 


Red Cell Distribution Width


 


 


 


 13.3 %


(11.5-14.5)


 


Platelet Count


 


 


 


 202 x10^3/uL


(140-400)


 


Neutrophils (%) (Auto)    42 % (31-73) 


 


Lymphocytes (%) (Auto)    43 % (24-48) 


 


Monocytes (%) (Auto)    10 % (0-9) 


 


Eosinophils (%) (Auto)    3 % (0-3) 


 


Basophils (%) (Auto)    1 % (0-3) 


 


Neutrophils # (Auto)


 


 


 


 4.0 x10^3uL


(1.8-7.7)


 


Lymphocytes # (Auto)


 


 


 


 4.0 x10^3/uL


(1.0-4.8)


 


Monocytes # (Auto)


 


 


 


 1.0 x10^3/uL


(0.0-1.1)


 


Eosinophils # (Auto)


 


 


 


 0.3 x10^3/uL


(0.0-0.7)


 


Basophils # (Auto)


 


 


 


 0.1 x10^3/uL


(0.0-0.2)


 


Sodium Level


 


 


 


 144 mmol/L


(136-145)


 


Potassium Level


 


 


 


 4.8 mmol/L


(3.5-5.1)


 


Chloride Level


 


 


 


 105 mmol/L


()


 


Carbon Dioxide Level


 


 


 


 34 mmol/L


(21-32)


 


Anion Gap    5 (6-14) 


 


Blood Urea Nitrogen


 


 


 


 23 mg/dL


(7-20)


 


Creatinine


 


 


 


 1.0 mg/dL


(0.6-1.0)


 


Estimated GFR


(Cockcroft-Gault) 


 


 


 54.7 





 


BUN/Creatinine Ratio    23 (6-20) 


 


Glucose Level


 


 


 


 131 mg/dL


(70-99)


 


Calcium Level


 


 


 


 8.8 mg/dL


(8.5-10.1)


 


Total Bilirubin


 


 


 


 0.3 mg/dL


(0.2-1.0)


 


Aspartate Amino Transf


(AST/SGOT) 


 


 


 24 U/L (15-37) 





 


Alanine Aminotransferase


(ALT/SGPT) 


 


 


 29 U/L (14-59) 





 


Alkaline Phosphatase


 


 


 


 90 U/L


()


 


Total Protein


 


 


 


 6.6 g/dL


(6.4-8.2)


 


Albumin


 


 


 


 3.1 g/dL


(3.4-5.0)


 


Albumin/Globulin Ratio    0.9 (1.0-1.7) 


 


Triglycerides Level


 


 


 


 193 mg/dL


(0-150)


 


Cholesterol Level


 


 


 


 176 mg/dL


(0-200)


 


LDL Cholesterol, Calculated


 


 


 


 98 mg/dL


(0-100)


 


VLDL Cholesterol, Calculated


 


 


 


 39 mg/dL


(0-40)


 


Non-HDL Cholesterol Calculated


 


 


 


 137 mg/dL


(0-129)


 


HDL Cholesterol


 


 


 


 39 mg/dL


(40-60)


 


Cholesterol/HDL Ratio    4.5 


 


Thyroid Stimulating Hormone


(TSH) 


 


 


 1.059 uIU/mL


(0.358-3.74)


 


Test


 9/14/18


08:25 


 


 





 


Glucose (Fingerstick)


 117 mg/dL


(70-99) 


 


 











Laboratory Tests








Test


 9/13/18


17:30 9/13/18


20:20 9/13/18


20:57 9/14/18


02:30


 


Prothrombin Time


 12.0 SEC


(11.7-14.0) 


 


 





 


Prothromb Time International


Ratio 0.9 (0.8-1.1) 


 


 


 





 


Troponin I Quantitative


 < 0.017 ng/mL


(0.000-0.055) < 0.017 ng/mL


(0.000-0.055) 


 < 0.017 ng/mL


(0.000-0.055)


 


Glucose (Fingerstick)


 


 


 135 mg/dL


(70-99) 





 


White Blood Count


 


 


 


 9.4 x10^3/uL


(4.0-11.0)


 


Red Blood Count


 


 


 


 3.92 x10^6/uL


(3.50-5.40)


 


Hemoglobin


 


 


 


 12.0 g/dL


(12.0-15.5)


 


Hematocrit


 


 


 


 35.6 %


(36.0-47.0)


 


Mean Corpuscular Volume    91 fL () 


 


Mean Corpuscular Hemoglobin    31 pg (25-35) 


 


Mean Corpuscular Hemoglobin


Concent 


 


 


 34 g/dL


(31-37)


 


Red Cell Distribution Width


 


 


 


 13.3 %


(11.5-14.5)


 


Platelet Count


 


 


 


 202 x10^3/uL


(140-400)


 


Neutrophils (%) (Auto)    42 % (31-73) 


 


Lymphocytes (%) (Auto)    43 % (24-48) 


 


Monocytes (%) (Auto)    10 % (0-9) 


 


Eosinophils (%) (Auto)    3 % (0-3) 


 


Basophils (%) (Auto)    1 % (0-3) 


 


Neutrophils # (Auto)


 


 


 


 4.0 x10^3uL


(1.8-7.7)


 


Lymphocytes # (Auto)


 


 


 


 4.0 x10^3/uL


(1.0-4.8)


 


Monocytes # (Auto)


 


 


 


 1.0 x10^3/uL


(0.0-1.1)


 


Eosinophils # (Auto)


 


 


 


 0.3 x10^3/uL


(0.0-0.7)


 


Basophils # (Auto)


 


 


 


 0.1 x10^3/uL


(0.0-0.2)


 


Sodium Level


 


 


 


 144 mmol/L


(136-145)


 


Potassium Level


 


 


 


 4.8 mmol/L


(3.5-5.1)


 


Chloride Level


 


 


 


 105 mmol/L


()


 


Carbon Dioxide Level


 


 


 


 34 mmol/L


(21-32)


 


Anion Gap    5 (6-14) 


 


Blood Urea Nitrogen


 


 


 


 23 mg/dL


(7-20)


 


Creatinine


 


 


 


 1.0 mg/dL


(0.6-1.0)


 


Estimated GFR


(Cockcroft-Gault) 


 


 


 54.7 





 


BUN/Creatinine Ratio    23 (6-20) 


 


Glucose Level


 


 


 


 131 mg/dL


(70-99)


 


Calcium Level


 


 


 


 8.8 mg/dL


(8.5-10.1)


 


Total Bilirubin


 


 


 


 0.3 mg/dL


(0.2-1.0)


 


Aspartate Amino Transf


(AST/SGOT) 


 


 


 24 U/L (15-37) 





 


Alanine Aminotransferase


(ALT/SGPT) 


 


 


 29 U/L (14-59) 





 


Alkaline Phosphatase


 


 


 


 90 U/L


()


 


Total Protein


 


 


 


 6.6 g/dL


(6.4-8.2)


 


Albumin


 


 


 


 3.1 g/dL


(3.4-5.0)


 


Albumin/Globulin Ratio    0.9 (1.0-1.7) 


 


Triglycerides Level


 


 


 


 193 mg/dL


(0-150)


 


Cholesterol Level


 


 


 


 176 mg/dL


(0-200)


 


LDL Cholesterol, Calculated


 


 


 


 98 mg/dL


(0-100)


 


VLDL Cholesterol, Calculated


 


 


 


 39 mg/dL


(0-40)


 


Non-HDL Cholesterol Calculated


 


 


 


 137 mg/dL


(0-129)


 


HDL Cholesterol


 


 


 


 39 mg/dL


(40-60)


 


Cholesterol/HDL Ratio    4.5 


 


Thyroid Stimulating Hormone


(TSH) 


 


 


 1.059 uIU/mL


(0.358-3.74)


 


Test


 9/14/18


08:25 


 


 





 


Glucose (Fingerstick)


 117 mg/dL


(70-99) 


 


 











Medications





Active Scripts








 Medications  Dose


 Route/Sig


 Max Daily Dose Days Date Category


 


 Diclofenac Sodium


 75 Mg Tablet.dr  75 Mg


 PO BID


   9/13/18 Reported


 


 Citalopram Hbr


  (Citalopram


 Hydrobromide) 10


 Mg Tablet  10 Mg


 PO DAILY


   9/13/18 Reported


 


 Losartan


 Potassium 100 Mg


 Tablet  100 Mg


 PO DAILY


   9/13/18 Reported


 


 Ipratropium


 Bromide 0.2 Mg/1


 Ml Solution  0.2 Mg


 IH Q8HRS


   9/13/18 Reported


 


 Meclizine Hcl 25


 Mg Tablet  25 Mg


 PO QID PRN


   9/13/18 Reported


 


 Spiriva


  (Tiotropium


 Bromide) 18 Mcg


 Cap.w.dev  1 Cap


 IH DAILY


   9/13/18 Reported


 


 Calcium (Calcium


 Carbonate) 600 Mg


 Tablet  600 Mg


 PO DAILY


   9/13/18 Reported


 


 Carvedilol 6.25


 Mg Tablet  1 Tab


 PO BID


   1/25/18 Reported


 


 Vitamin D3


  (Cholecalciferol


  (Vitamin D3))


 1,000 Unit Tablet  2,000 Unit


 PO DAILY


   1/25/18 Reported


 


 Vitamin B-12


  (Cyanocobalamin


  (Vitamin B-12))


 1,000 Mcg Tablet  5,000 Mcg


 PO DAILY


   1/25/18 Reported


 


 Omeprazole 40 Mg


 Capsule.  40 Mg


 PO DAILY


   1/25/18 Reported


 


 Atorvastatin


 Calcium 80 Mg


 Tablet  80 Mg


 PO HS


   1/25/18 Reported


 


 Fluticasone


 Propionate Nasal


 Spray


  (Fluticasone


 Propionate) 16 Gm


 Spray.susp  1 Spray


 NS BID


   1/25/18 Reported


 


 Trazodone Hcl 100


 Mg Tablet  1 Tab


 PO QHS


   3/16/16 Reported


 


 Gabapentin 600 Mg


 Tablet  600 Mg


 PO TID


   3/16/16 Reported


 


 Aspir 81


  (Aspirin) 81 Mg


 Tablet.dr  1 Tab


 PO DAILY


   3/16/16 Reported











Impression


.


NOTED DICTATED


POSSIBLE PE I REVIEWED THE V/Q SCAN NOT CONVINCED THAT IT IS HIGH PROBABILITY


WILL CHECK CT ANGIO


CLINICAL SUSPICION FOR PE IS LOW


IF CT IS NEGATIVE OK TO D/C TODAY











MICHELLE REDDY MD Sep 14, 2018 11:08

## 2018-09-14 NOTE — PDOC
PROGRESS NOTES


Chief Complaint


Chief Complaint


CC:


Chest pain


MI, 2 stents 


CAD


HTN


GERD


Osteoarthritis


Chronic renal insufficiency 


Appendectomy


Cholecystectomy


Tonsillectomy


Hysterectomy


<1 ppd smoker





History of Present Illness


History of Present Illness


Pt. seen and examined


Pt. alert and oriented; pt's affect good 


Pt. was seen at [facility] for 1 wk chest tightness; hx of CAD and 2 stents


DW nursing


Pt. receiving echo upon entrance


Cardiac workup in-progress


Troponin I: <0.017 (9/14)





Vitals


Vitals





Vital Signs








  Date Time  Temp Pulse Resp B/P (MAP) Pulse Ox O2 Delivery O2 Flow Rate FiO2


 


9/14/18 09:52  63  111/51 (71)    


 


9/14/18 07:28     95 Room Air  


 


9/14/18 06:15 98.2  16    2.0 





 98.2       











Physical Exam


General:  Alert, Oriented X3, Cooperative


Heart:  Regular rate, Normal S1, Normal S2


Lungs:  Clear


Abdomen:  Normal bowel sounds, Soft


Extremities:  No clubbing, No cyanosis, Other (trace left leg edema)


Skin:  No rashes, No breakdown





Labs


LABS





Laboratory Tests








Test


 9/13/18


17:30 9/13/18


20:20 9/13/18


20:57 9/14/18


02:30


 


Prothrombin Time


 12.0 SEC


(11.7-14.0) 


 


 





 


Prothromb Time International


Ratio 0.9 (0.8-1.1) 


 


 


 





 


Troponin I Quantitative


 < 0.017 ng/mL


(0.000-0.055) < 0.017 ng/mL


(0.000-0.055) 


 < 0.017 ng/mL


(0.000-0.055)


 


Glucose (Fingerstick)


 


 


 135 mg/dL


(70-99) 





 


White Blood Count


 


 


 


 9.4 x10^3/uL


(4.0-11.0)


 


Red Blood Count


 


 


 


 3.92 x10^6/uL


(3.50-5.40)


 


Hemoglobin


 


 


 


 12.0 g/dL


(12.0-15.5)


 


Hematocrit


 


 


 


 35.6 %


(36.0-47.0)


 


Mean Corpuscular Volume    91 fL () 


 


Mean Corpuscular Hemoglobin    31 pg (25-35) 


 


Mean Corpuscular Hemoglobin


Concent 


 


 


 34 g/dL


(31-37)


 


Red Cell Distribution Width


 


 


 


 13.3 %


(11.5-14.5)


 


Platelet Count


 


 


 


 202 x10^3/uL


(140-400)


 


Neutrophils (%) (Auto)    42 % (31-73) 


 


Lymphocytes (%) (Auto)    43 % (24-48) 


 


Monocytes (%) (Auto)    10 % (0-9) 


 


Eosinophils (%) (Auto)    3 % (0-3) 


 


Basophils (%) (Auto)    1 % (0-3) 


 


Neutrophils # (Auto)


 


 


 


 4.0 x10^3uL


(1.8-7.7)


 


Lymphocytes # (Auto)


 


 


 


 4.0 x10^3/uL


(1.0-4.8)


 


Monocytes # (Auto)


 


 


 


 1.0 x10^3/uL


(0.0-1.1)


 


Eosinophils # (Auto)


 


 


 


 0.3 x10^3/uL


(0.0-0.7)


 


Basophils # (Auto)


 


 


 


 0.1 x10^3/uL


(0.0-0.2)


 


Sodium Level


 


 


 


 144 mmol/L


(136-145)


 


Potassium Level


 


 


 


 4.8 mmol/L


(3.5-5.1)


 


Chloride Level


 


 


 


 105 mmol/L


()


 


Carbon Dioxide Level


 


 


 


 34 mmol/L


(21-32)


 


Anion Gap    5 (6-14) 


 


Blood Urea Nitrogen


 


 


 


 23 mg/dL


(7-20)


 


Creatinine


 


 


 


 1.0 mg/dL


(0.6-1.0)


 


Estimated GFR


(Cockcroft-Gault) 


 


 


 54.7 





 


BUN/Creatinine Ratio    23 (6-20) 


 


Glucose Level


 


 


 


 131 mg/dL


(70-99)


 


Calcium Level


 


 


 


 8.8 mg/dL


(8.5-10.1)


 


Total Bilirubin


 


 


 


 0.3 mg/dL


(0.2-1.0)


 


Aspartate Amino Transf


(AST/SGOT) 


 


 


 24 U/L (15-37) 





 


Alanine Aminotransferase


(ALT/SGPT) 


 


 


 29 U/L (14-59) 





 


Alkaline Phosphatase


 


 


 


 90 U/L


()


 


Total Protein


 


 


 


 6.6 g/dL


(6.4-8.2)


 


Albumin


 


 


 


 3.1 g/dL


(3.4-5.0)


 


Albumin/Globulin Ratio    0.9 (1.0-1.7) 


 


Triglycerides Level


 


 


 


 193 mg/dL


(0-150)


 


Cholesterol Level


 


 


 


 176 mg/dL


(0-200)


 


LDL Cholesterol, Calculated


 


 


 


 98 mg/dL


(0-100)


 


VLDL Cholesterol, Calculated


 


 


 


 39 mg/dL


(0-40)


 


Non-HDL Cholesterol Calculated


 


 


 


 137 mg/dL


(0-129)


 


HDL Cholesterol


 


 


 


 39 mg/dL


(40-60)


 


Cholesterol/HDL Ratio    4.5 


 


Thyroid Stimulating Hormone


(TSH) 


 


 


 1.059 uIU/mL


(0.358-3.74)


 


Test


 9/14/18


08:25 


 


 





 


Glucose (Fingerstick)


 117 mg/dL


(70-99) 


 


 














Review of Systems


Review of Systems


C/O weakness


Pt. denies current chest pain





Assessment and Plan


Assessmemt and Plan


CC:


Chest pain





Assessment:


Chest pain


MI, 2 stents 


CAD


HTN


GERD


Osteoarthritis


Chronic renal insufficiency 


Appendectomy


Cholecystectomy


Tonsillectomy


Hysterectomy


<1 ppd smoker





Plan:


Continue cardiac monitoring


Awaiting echo results and cardiac workup


Fu w/ cardio; appreciate input


Monitor labs


PT/OT


Home meds


Continue current diet





Comment


Review of Relevant


I have reviewed the following items irma (where applicable) has been applied.


Labs





Laboratory Tests








Test


 9/13/18


17:30 9/13/18


20:20 9/13/18


20:57 9/14/18


02:30


 


Prothrombin Time


 12.0 SEC


(11.7-14.0) 


 


 





 


Prothromb Time International


Ratio 0.9 (0.8-1.1) 


 


 


 





 


Troponin I Quantitative


 < 0.017 ng/mL


(0.000-0.055) < 0.017 ng/mL


(0.000-0.055) 


 < 0.017 ng/mL


(0.000-0.055)


 


Glucose (Fingerstick)


 


 


 135 mg/dL


(70-99) 





 


White Blood Count


 


 


 


 9.4 x10^3/uL


(4.0-11.0)


 


Red Blood Count


 


 


 


 3.92 x10^6/uL


(3.50-5.40)


 


Hemoglobin


 


 


 


 12.0 g/dL


(12.0-15.5)


 


Hematocrit


 


 


 


 35.6 %


(36.0-47.0)


 


Mean Corpuscular Volume    91 fL () 


 


Mean Corpuscular Hemoglobin    31 pg (25-35) 


 


Mean Corpuscular Hemoglobin


Concent 


 


 


 34 g/dL


(31-37)


 


Red Cell Distribution Width


 


 


 


 13.3 %


(11.5-14.5)


 


Platelet Count


 


 


 


 202 x10^3/uL


(140-400)


 


Neutrophils (%) (Auto)    42 % (31-73) 


 


Lymphocytes (%) (Auto)    43 % (24-48) 


 


Monocytes (%) (Auto)    10 % (0-9) 


 


Eosinophils (%) (Auto)    3 % (0-3) 


 


Basophils (%) (Auto)    1 % (0-3) 


 


Neutrophils # (Auto)


 


 


 


 4.0 x10^3uL


(1.8-7.7)


 


Lymphocytes # (Auto)


 


 


 


 4.0 x10^3/uL


(1.0-4.8)


 


Monocytes # (Auto)


 


 


 


 1.0 x10^3/uL


(0.0-1.1)


 


Eosinophils # (Auto)


 


 


 


 0.3 x10^3/uL


(0.0-0.7)


 


Basophils # (Auto)


 


 


 


 0.1 x10^3/uL


(0.0-0.2)


 


Sodium Level


 


 


 


 144 mmol/L


(136-145)


 


Potassium Level


 


 


 


 4.8 mmol/L


(3.5-5.1)


 


Chloride Level


 


 


 


 105 mmol/L


()


 


Carbon Dioxide Level


 


 


 


 34 mmol/L


(21-32)


 


Anion Gap    5 (6-14) 


 


Blood Urea Nitrogen


 


 


 


 23 mg/dL


(7-20)


 


Creatinine


 


 


 


 1.0 mg/dL


(0.6-1.0)


 


Estimated GFR


(Cockcroft-Gault) 


 


 


 54.7 





 


BUN/Creatinine Ratio    23 (6-20) 


 


Glucose Level


 


 


 


 131 mg/dL


(70-99)


 


Calcium Level


 


 


 


 8.8 mg/dL


(8.5-10.1)


 


Total Bilirubin


 


 


 


 0.3 mg/dL


(0.2-1.0)


 


Aspartate Amino Transf


(AST/SGOT) 


 


 


 24 U/L (15-37) 





 


Alanine Aminotransferase


(ALT/SGPT) 


 


 


 29 U/L (14-59) 





 


Alkaline Phosphatase


 


 


 


 90 U/L


()


 


Total Protein


 


 


 


 6.6 g/dL


(6.4-8.2)


 


Albumin


 


 


 


 3.1 g/dL


(3.4-5.0)


 


Albumin/Globulin Ratio    0.9 (1.0-1.7) 


 


Triglycerides Level


 


 


 


 193 mg/dL


(0-150)


 


Cholesterol Level


 


 


 


 176 mg/dL


(0-200)


 


LDL Cholesterol, Calculated


 


 


 


 98 mg/dL


(0-100)


 


VLDL Cholesterol, Calculated


 


 


 


 39 mg/dL


(0-40)


 


Non-HDL Cholesterol Calculated


 


 


 


 137 mg/dL


(0-129)


 


HDL Cholesterol


 


 


 


 39 mg/dL


(40-60)


 


Cholesterol/HDL Ratio    4.5 


 


Thyroid Stimulating Hormone


(TSH) 


 


 


 1.059 uIU/mL


(0.358-3.74)


 


Test


 9/14/18


08:25 


 


 





 


Glucose (Fingerstick)


 117 mg/dL


(70-99) 


 


 











Laboratory Tests








Test


 9/13/18


17:30 9/13/18


20:20 9/13/18


20:57 9/14/18


02:30


 


Prothrombin Time


 12.0 SEC


(11.7-14.0) 


 


 





 


Prothromb Time International


Ratio 0.9 (0.8-1.1) 


 


 


 





 


Troponin I Quantitative


 < 0.017 ng/mL


(0.000-0.055) < 0.017 ng/mL


(0.000-0.055) 


 < 0.017 ng/mL


(0.000-0.055)


 


Glucose (Fingerstick)


 


 


 135 mg/dL


(70-99) 





 


White Blood Count


 


 


 


 9.4 x10^3/uL


(4.0-11.0)


 


Red Blood Count


 


 


 


 3.92 x10^6/uL


(3.50-5.40)


 


Hemoglobin


 


 


 


 12.0 g/dL


(12.0-15.5)


 


Hematocrit


 


 


 


 35.6 %


(36.0-47.0)


 


Mean Corpuscular Volume    91 fL () 


 


Mean Corpuscular Hemoglobin    31 pg (25-35) 


 


Mean Corpuscular Hemoglobin


Concent 


 


 


 34 g/dL


(31-37)


 


Red Cell Distribution Width


 


 


 


 13.3 %


(11.5-14.5)


 


Platelet Count


 


 


 


 202 x10^3/uL


(140-400)


 


Neutrophils (%) (Auto)    42 % (31-73) 


 


Lymphocytes (%) (Auto)    43 % (24-48) 


 


Monocytes (%) (Auto)    10 % (0-9) 


 


Eosinophils (%) (Auto)    3 % (0-3) 


 


Basophils (%) (Auto)    1 % (0-3) 


 


Neutrophils # (Auto)


 


 


 


 4.0 x10^3uL


(1.8-7.7)


 


Lymphocytes # (Auto)


 


 


 


 4.0 x10^3/uL


(1.0-4.8)


 


Monocytes # (Auto)


 


 


 


 1.0 x10^3/uL


(0.0-1.1)


 


Eosinophils # (Auto)


 


 


 


 0.3 x10^3/uL


(0.0-0.7)


 


Basophils # (Auto)


 


 


 


 0.1 x10^3/uL


(0.0-0.2)


 


Sodium Level


 


 


 


 144 mmol/L


(136-145)


 


Potassium Level


 


 


 


 4.8 mmol/L


(3.5-5.1)


 


Chloride Level


 


 


 


 105 mmol/L


()


 


Carbon Dioxide Level


 


 


 


 34 mmol/L


(21-32)


 


Anion Gap    5 (6-14) 


 


Blood Urea Nitrogen


 


 


 


 23 mg/dL


(7-20)


 


Creatinine


 


 


 


 1.0 mg/dL


(0.6-1.0)


 


Estimated GFR


(Cockcroft-Gault) 


 


 


 54.7 





 


BUN/Creatinine Ratio    23 (6-20) 


 


Glucose Level


 


 


 


 131 mg/dL


(70-99)


 


Calcium Level


 


 


 


 8.8 mg/dL


(8.5-10.1)


 


Total Bilirubin


 


 


 


 0.3 mg/dL


(0.2-1.0)


 


Aspartate Amino Transf


(AST/SGOT) 


 


 


 24 U/L (15-37) 





 


Alanine Aminotransferase


(ALT/SGPT) 


 


 


 29 U/L (14-59) 





 


Alkaline Phosphatase


 


 


 


 90 U/L


()


 


Total Protein


 


 


 


 6.6 g/dL


(6.4-8.2)


 


Albumin


 


 


 


 3.1 g/dL


(3.4-5.0)


 


Albumin/Globulin Ratio    0.9 (1.0-1.7) 


 


Triglycerides Level


 


 


 


 193 mg/dL


(0-150)


 


Cholesterol Level


 


 


 


 176 mg/dL


(0-200)


 


LDL Cholesterol, Calculated


 


 


 


 98 mg/dL


(0-100)


 


VLDL Cholesterol, Calculated


 


 


 


 39 mg/dL


(0-40)


 


Non-HDL Cholesterol Calculated


 


 


 


 137 mg/dL


(0-129)


 


HDL Cholesterol


 


 


 


 39 mg/dL


(40-60)


 


Cholesterol/HDL Ratio    4.5 


 


Thyroid Stimulating Hormone


(TSH) 


 


 


 1.059 uIU/mL


(0.358-3.74)


 


Test


 9/14/18


08:25 


 


 





 


Glucose (Fingerstick)


 117 mg/dL


(70-99) 


 


 











Medications





Current Medications


Aspirin (Alexia Aspirin) 325 mg 1X  ONCE PO  Last administered on 9/13/18at 18:16

;  Start 9/13/18 at 17:30;  Stop 9/13/18 at 17:31;  Status DC


Nitroglycerin (Nitrostat) 0.4 mg PRN Q5MIN  PRN SL CHEST PAIN;  Start 9/13/18 

at 17:30


Nitroglycerin (Nitro-Bid Oint) 1 inch Q6HRS TP  Last administered on 9/13/18at 

23:35;  Start 9/13/18 at 18:00


Morphine Sulfate (Morphine Sulfate) 1 mg PRN Q10MIN  PRN IV CHEST PAIN Last 

administered on 9/14/18at 03:29;  Start 9/13/18 at 17:30


Acetaminophen (Tylenol) 650 mg PRN Q6HRS  PRN PO MILD PAIN / TEMP;  Start 9/13/ 18 at 17:30


Ondansetron HCl (Zofran) 4 mg PRN Q6HRS  PRN IV NAUSEA/VOMITING Last 

administered on 9/14/18at 01:56;  Start 9/13/18 at 17:30


Prochlorperazine (Compazine) 25 mg PRN Q12HR  PRN MA NAUSEA/VOMITING;  Start 9/ 13/18 at 17:30


Zolpidem Tartrate (Ambien) 5 mg PRN QHS  PRN PO INSOMNIA;  Start 9/13/18 at 17:

30


Enoxaparin Sodium (Lovenox 40mg Syringe) 40 mg Q12HR SQ ;  Start 9/13/18 at 21:

00;  Status Cancel


Sodium Chloride (Normal Saline Flush) 3 ml QSHIFT  PRN IV AFTER MEDS AND BLOOD 

DRAWS;  Start 9/13/18 at 17:30


Sodium Chloride 1,000 ml @  100 mls/hr Q10H IV  Last administered on 9/14/18at 

03:30;  Start 9/13/18 at 17:21


Senna/Docusate Sodium (Senna Plus) 1 tab BID PO  Last administered on 9/13/18at 

21:36;  Start 9/13/18 at 21:00


Docusate Sodium (Colace) 100 mg BID PO  Last administered on 9/13/18at 21:35;  

Start 9/13/18 at 21:00


Magnesium Hydroxide (Milk Of Magnesia) 2,400 mg PRN Q12HR  PRN PO CONSTIPATION;

  Start 9/13/18 at 17:30


Aspirin (Ecotrin) 81 mg DAILY PO ;  Start 9/14/18 at 09:00


Carvedilol (Coreg) 6.25 mg BIDWMEALS PO  Last administered on 9/13/18at 18:16;  

Start 9/13/18 at 18:00


Vitamin D (Vitamin D3) 2,000 unit DAILY PO ;  Start 9/14/18 at 09:00


Citalopram Hydrobromide (CeleXA) 10 mg DAILY PO ;  Start 9/14/18 at 09:00


Cyanocobalamin (Vitamin B-12) 5,000 mcg DAILY PO ;  Start 9/14/18 at 09:00


Fluticasone Propionate (Flonase) 1 spray BID NS  Last administered on 9/13/18at 

21:34;  Start 9/13/18 at 21:00


Ipratropium Bromide (Atrovent) 0.2 mg Q8HRS IH ;  Start 9/13/18 at 22:00;  

Status Cancel


Trazodone HCl (Desyrel) 100 mg QHS PO  Last administered on 9/13/18at 21:35;  

Start 9/13/18 at 21:00


Atorvastatin Calcium (Lipitor) 80 mg QHS PO  Last administered on 9/13/18at 21:

36;  Start 9/13/18 at 21:00


Calcium Carbonate/ Glycine (Oscal) 500 mg DAILY PO ;  Start 9/14/18 at 09:00


Diclofenac Sodium (Voltaren) 75 mg BID PO  Last administered on 9/13/18at 21:35

;  Start 9/13/18 at 21:00


Gabapentin (Neurontin) 600 mg TID PO  Last administered on 9/13/18at 21:34;  

Start 9/13/18 at 21:00


Losartan Potassium (Cozaar) 100 mg DAILY PO ;  Start 9/14/18 at 09:00


Meclizine HCl (Antivert) 25 mg PRN TID  PRN PO DIZZINESS;  Start 9/13/18 at 18:

00


Pantoprazole Sodium (Protonix) 40 mg DAILYAC PO ;  Start 9/14/18 at 07:30


Albuterol/ Ipratropium (Duoneb) 3 ml RTQID NEB  Last administered on 9/14/18at 

07:28;  Start 9/13/18 at 20:00


Enoxaparin Sodium (Lovenox 80mg Syringe) 80 mg Q12HR SQ  Last administered on 9/ 13/18at 18:18;  Start 9/13/18 at 19:01;  Stop 9/14/18 at 07:18;  Status DC


Enalaprilat (Vasotec Inj) 1.25 mg PRN Q6HRS  PRN IVP HYPERTENSION, SEE COMMENTS 

Last administered on 9/14/18at 03:30;  Start 9/14/18 at 03:15


Enoxaparin Sodium (Lovenox 80mg Syringe) 70 mg Q12HR SQ ;  Start 9/14/18 at 09:

00





Active Scripts


Active


Reported


Diclofenac Sodium 75 Mg Tablet.dr 75 Mg PO BID


Citalopram Hbr (Citalopram Hydrobromide) 10 Mg Tablet 10 Mg PO DAILY


Losartan Potassium 100 Mg Tablet 100 Mg PO DAILY


Ipratropium Bromide 0.2 Mg/1 Ml Solution 0.2 Mg IH Q8HRS


Meclizine Hcl 25 Mg Tablet 25 Mg PO QID PRN


Spiriva (Tiotropium Bromide) 18 Mcg Cap.w.dev 1 Cap IH DAILY


Calcium (Calcium Carbonate) 600 Mg Tablet 600 Mg PO DAILY


Carvedilol 6.25 Mg Tablet 1 Tab PO BID


Vitamin D3 (Cholecalciferol (Vitamin D3)) 1,000 Unit Tablet 2,000 Unit PO DAILY


Vitamin B-12 (Cyanocobalamin (Vitamin B-12)) 1,000 Mcg Tablet 5,000 Mcg PO DAILY


Omeprazole 40 Mg Capsule.dr 40 Mg PO DAILY


Atorvastatin Calcium 80 Mg Tablet 80 Mg PO HS


Fluticasone Propionate Nasal Spray (Fluticasone Propionate) 16 Gm Spray.susp 1 

Spray NS BID


Trazodone Hcl 100 Mg Tablet 1 Tab PO QHS


Gabapentin 600 Mg Tablet 600 Mg PO TID


Aspir 81 (Aspirin) 81 Mg Tablet.dr 1 Tab PO DAILY


Vitals/I & O





Vital Sign - Last 24 Hours








 9/13/18 9/13/18 9/13/18 9/13/18





 17:30 18:16 18:17 19:00


 


Temp    98.4





    98.4


 


Pulse  71 71 72


 


Resp    16


 


B/P (MAP)  171/81 171/81 170/77 (108)


 


Pulse Ox    94


 


O2 Delivery Room Air   Room Air


 


    





    





 9/13/18 9/13/18 9/13/18 9/13/18





 19:25 19:47 22:32 23:35


 


Temp   97.9 





   97.9 


 


Pulse   72 72


 


Resp   16 


 


B/P (MAP)   140/62 (88) 140/62


 


Pulse Ox  95 95 


 


O2 Delivery Room Air Room Air Room Air 


 


    





    





 9/14/18 9/14/18 9/14/18 9/14/18





 01:56 02:51 03:29 03:30


 


Temp  98.3  





  98.3  


 


Pulse  82  84


 


Resp 16 18 18 


 


B/P (MAP)  173/72 (105)  173/72


 


Pulse Ox 97 93 94 


 


O2 Delivery Nasal Cannula Room Air Room Air 


 


    





    





 9/14/18 9/14/18 9/14/18 9/14/18





 04:07 04:35 06:15 07:28


 


Temp   98.2 





   98.2 


 


Pulse  63 65 


 


Resp 18  16 


 


B/P (MAP)  100/41 (60) 99/43 (61) 


 


Pulse Ox 97  94 95


 


O2 Delivery Nasal Cannula  Nasal Cannula Room Air


 


O2 Flow Rate 2.0  2.0 


 


    





    





 9/14/18   





 09:52   


 


Pulse 63   


 


B/P (MAP) 111/51 (71)   














Intake and Output   


 


 9/13/18 9/13/18 9/14/18





 15:00 23:00 07:00


 


Intake Total   120 ml


 


Balance   120 ml

















ANGELINA CAPMBELL III DO Sep 14, 2018 10:25

## 2018-09-14 NOTE — CARD
MR#: [id number]

Account#: [id number]

Accession#: [id number]

Date of Study: 2018

Ordering Physician: SHANNON HUNTER, 

Referring Physician: ELISHA SCOTT, 

Tech: SARA Yoon





--------------- APPROVED REPORT --------------





EXAM: Two-dimensional and M-mode echocardiogram with Doppler and color Doppler.



Other Information 

Quality : AverageHR: 60bpm

Technically limited study due to  CABG.



INDICATION

Pericardial Effusion 

Chest Pain 



Surgery/Intervention

CABD DIMENSIONS 

RVDd3.0 (2.9-3.5cm)Left Atrium(2D)3.2 (1.6-4.0cm)

IVSd1.9 (0.7-1.1cm)Aortic Root(2D)2.7 (2.0-3.7cm)

LVDd3.6 (3.9-5.9cm)LVOT Diameter1.9 (1.8-2.4cm)

PWd1.6 (0.7-1.1cm)IVSs2.5 (0.8-1.2cm)

LVDs2.5 (2.5-4.0cm)FS (%) 30.1 %

PWs1.9 (0.8-1.2cm)SV31.9 ml

LVEF(%)58.4 (>50%)



Aortic Valve

AoV Peak Ranulfo.168.7cm/sAoV VTI39.4cm

AO Peak GR.11.4mmHgLVOT Peak Ranulfo.108.7cm/s

LVOT  VTI 27.92cmAO Mean GR.6mmHg

SHAYY (VMAX)1.25ru8UFC   (VTI)1.99cm2



Mitral Valve

MV E Mzdrbffb933.9cm/sMV E Peak Gr.67mmHg

MV DECEL [id number] A Fevnbgqw01.4cm/s

MV RWJ48fzJ/A  Ratio1.2

MVA (PHT)3.13cm2



TDI

E/Lateral E'20.7E/Medial E'18.7



Pulmonary Valve

PV Peak Nkcwvtkm601.9cm/sPV Peak Grad.5mmHg



Tricuspid Valve

TR P. Cbllchnj401cq/sRAP DRWZENPN92rjJk

TR Peak Gr.96xhFgYTSP87bnBi



Pulmonary Vein

S1 Njfyjkzr78.3cm/sD2 Ttoomgvq07.7cm/s



 LEFT VENTRICLE 

The left ventricle is normal size. There is moderate concentric left ventricular hypertrophy. The lef
t ventricular systolic function is normal. The ejection fraction is estimated at 60-65%. There is nor
mal LV segmental wall motion. The left ventricular diastolic function and filling is normal for age.



 RIGHT VENTRICLE 

The right ventricle is normal size. The right ventricular systolic function is normal.



 ATRIA 

The left atrium size is normal. The right atrium size is normal. The interatrial septum is intact wit
h no evidence for an atrial septal defect or patent foramen ovale as noted on 2-D or Doppler imaging.




 AORTIC VALVE 

The aortic valve is mildly thickened but opens well. Doppler and Color Flow revealed no significant a
ortic regurgitation. There is no significant aortic valvular stenosis. There is no aortic valvular ve
getation.



 MITRAL VALVE 

The mitral valve is normal in structure. Mitral annular calcification is mild. There is no evidence o
f mitral valve prolapse. There is no mitral valve stenosis. Doppler and Color-flow revealed mild mitr
al regurgitation.



 TRICUSPID VALVE 

The tricuspid valve is not well visualized. Doppler and Color Flow revealed trace to mild tricuspid r
egurgitation. There is no tricuspid valve prolapse or vegetation. There is no tricuspid valve stenosi
s.



 PULMONIC VALVE 

The pulmonic valve is not well visualized. Doppler and Color Flow revealed mild to moderate pulmonic 
valvular regurgitation. There is no pulmonic valvular stenosis.



 GREAT VESSELS 

The aortic root is normal in size. The IVC is dilated and collapses <50% with inspiration.



 PERICARDIAL EFFUSION 

There is no pleural effusion. There is no evidence of significant pericardial effusion.



Critical Notification

Critical Value: No



<Conclusion>

The left ventricular systolic function is normal.

The ejection fraction is estimated at 60-65%.

There is normal LV segmental wall motion.

Mild mitral regurgitation.

Mild tricuspid regurgitation.

There is no evidence of significant pericardial effusion.



Signed by : aJime Bright 

Electronically Approved : 2018 10:53:11

## 2018-09-15 VITALS — DIASTOLIC BLOOD PRESSURE: 53 MMHG | SYSTOLIC BLOOD PRESSURE: 129 MMHG

## 2018-09-15 VITALS — SYSTOLIC BLOOD PRESSURE: 140 MMHG | DIASTOLIC BLOOD PRESSURE: 70 MMHG

## 2018-09-15 VITALS — DIASTOLIC BLOOD PRESSURE: 72 MMHG | SYSTOLIC BLOOD PRESSURE: 165 MMHG

## 2018-09-15 VITALS — SYSTOLIC BLOOD PRESSURE: 118 MMHG | DIASTOLIC BLOOD PRESSURE: 58 MMHG

## 2018-09-15 VITALS — DIASTOLIC BLOOD PRESSURE: 58 MMHG | SYSTOLIC BLOOD PRESSURE: 122 MMHG

## 2018-09-15 VITALS — SYSTOLIC BLOOD PRESSURE: 127 MMHG | DIASTOLIC BLOOD PRESSURE: 67 MMHG

## 2018-09-15 VITALS — SYSTOLIC BLOOD PRESSURE: 122 MMHG | DIASTOLIC BLOOD PRESSURE: 56 MMHG

## 2018-09-15 VITALS — SYSTOLIC BLOOD PRESSURE: 145 MMHG | DIASTOLIC BLOOD PRESSURE: 60 MMHG

## 2018-09-15 VITALS — DIASTOLIC BLOOD PRESSURE: 64 MMHG | SYSTOLIC BLOOD PRESSURE: 120 MMHG

## 2018-09-15 VITALS — DIASTOLIC BLOOD PRESSURE: 57 MMHG | SYSTOLIC BLOOD PRESSURE: 115 MMHG

## 2018-09-15 VITALS — DIASTOLIC BLOOD PRESSURE: 75 MMHG | SYSTOLIC BLOOD PRESSURE: 174 MMHG

## 2018-09-15 VITALS — SYSTOLIC BLOOD PRESSURE: 120 MMHG | DIASTOLIC BLOOD PRESSURE: 64 MMHG

## 2018-09-15 VITALS — SYSTOLIC BLOOD PRESSURE: 121 MMHG | DIASTOLIC BLOOD PRESSURE: 52 MMHG

## 2018-09-15 VITALS — SYSTOLIC BLOOD PRESSURE: 208 MMHG | DIASTOLIC BLOOD PRESSURE: 82 MMHG

## 2018-09-15 LAB
ANION GAP SERPL CALC-SCNC: 3 MMOL/L (ref 6–14)
BASOPHILS # BLD AUTO: 0.1 X10^3/UL (ref 0–0.2)
BASOPHILS NFR BLD: 1 % (ref 0–3)
BUN SERPL-MCNC: 19 MG/DL (ref 7–20)
CALCIUM SERPL-MCNC: 8.5 MG/DL (ref 8.5–10.1)
CHLORIDE SERPL-SCNC: 107 MMOL/L (ref 98–107)
CO2 SERPL-SCNC: 33 MMOL/L (ref 21–32)
CREAT SERPL-MCNC: 0.9 MG/DL (ref 0.6–1)
EOSINOPHIL NFR BLD: 0.3 X10^3/UL (ref 0–0.7)
EOSINOPHIL NFR BLD: 3 % (ref 0–3)
ERYTHROCYTE [DISTWIDTH] IN BLOOD BY AUTOMATED COUNT: 13.5 % (ref 11.5–14.5)
GFR SERPLBLD BASED ON 1.73 SQ M-ARVRAT: 61.7 ML/MIN
GLUCOSE SERPL-MCNC: 123 MG/DL (ref 70–99)
HCT VFR BLD CALC: 32.5 % (ref 36–47)
HGB BLD-MCNC: 10.8 G/DL (ref 12–15.5)
LYMPHOCYTES # BLD: 2.9 X10^3/UL (ref 1–4.8)
LYMPHOCYTES NFR BLD AUTO: 33 % (ref 24–48)
MCH RBC QN AUTO: 30 PG (ref 25–35)
MCHC RBC AUTO-ENTMCNC: 33 G/DL (ref 31–37)
MCV RBC AUTO: 92 FL (ref 79–100)
MONO #: 0.9 X10^3/UL (ref 0–1.1)
MONOCYTES NFR BLD: 10 % (ref 0–9)
NEUT #: 4.6 X10^3UL (ref 1.8–7.7)
NEUTROPHILS NFR BLD AUTO: 53 % (ref 31–73)
PLATELET # BLD AUTO: 182 X10^3/UL (ref 140–400)
POTASSIUM SERPL-SCNC: 5.2 MMOL/L (ref 3.5–5.1)
RBC # BLD AUTO: 3.55 X10^6/UL (ref 3.5–5.4)
SODIUM SERPL-SCNC: 143 MMOL/L (ref 136–145)
WBC # BLD AUTO: 8.8 X10^3/UL (ref 4–11)

## 2018-09-15 RX ADMIN — CALCIUM SCH MG: 500 TABLET ORAL at 09:00

## 2018-09-15 RX ADMIN — CARVEDILOL SCH MG: 6.25 TABLET, FILM COATED ORAL at 12:13

## 2018-09-15 RX ADMIN — GABAPENTIN SCH MG: 300 CAPSULE ORAL at 20:05

## 2018-09-15 RX ADMIN — IPRATROPIUM BROMIDE AND ALBUTEROL SULFATE SCH ML: .5; 3 SOLUTION RESPIRATORY (INHALATION) at 19:42

## 2018-09-15 RX ADMIN — HYDROCODONE BITARTRATE AND ACETAMINOPHEN PRN TAB: 5; 325 TABLET ORAL at 12:14

## 2018-09-15 RX ADMIN — PANTOPRAZOLE SODIUM SCH MG: 40 TABLET, DELAYED RELEASE ORAL at 12:13

## 2018-09-15 RX ADMIN — NITROGLYCERIN SCH INCH: 20 OINTMENT TOPICAL at 04:19

## 2018-09-15 RX ADMIN — ENOXAPARIN SODIUM SCH MG: 40 INJECTION SUBCUTANEOUS at 20:04

## 2018-09-15 RX ADMIN — MORPHINE SULFATE PRN MG: 2 INJECTION, SOLUTION INTRAMUSCULAR; INTRAVENOUS at 04:16

## 2018-09-15 RX ADMIN — FLUTICASONE PROPIONATE SCH SPRAY: 50 SPRAY, METERED NASAL at 12:29

## 2018-09-15 RX ADMIN — MORPHINE SULFATE PRN MG: 2 INJECTION, SOLUTION INTRAMUSCULAR; INTRAVENOUS at 22:52

## 2018-09-15 RX ADMIN — LOSARTAN POTASSIUM SCH MG: 50 TABLET ORAL at 12:15

## 2018-09-15 RX ADMIN — CITALOPRAM HYDROBROMIDE SCH MG: 10 TABLET ORAL at 15:19

## 2018-09-15 RX ADMIN — GABAPENTIN SCH MG: 300 CAPSULE ORAL at 14:00

## 2018-09-15 RX ADMIN — FLUTICASONE PROPIONATE SCH SPRAY: 50 SPRAY, METERED NASAL at 20:01

## 2018-09-15 RX ADMIN — ATORVASTATIN CALCIUM SCH MG: 40 TABLET, FILM COATED ORAL at 20:05

## 2018-09-15 RX ADMIN — DOCUSATE SODIUM SCH MG: 100 CAPSULE, LIQUID FILLED ORAL at 15:19

## 2018-09-15 RX ADMIN — ONDANSETRON PRN MG: 2 INJECTION INTRAMUSCULAR; INTRAVENOUS at 22:52

## 2018-09-15 RX ADMIN — SENNOSIDES AND DOCUSATE SODIUM SCH TAB: 8.6; 5 TABLET ORAL at 20:05

## 2018-09-15 RX ADMIN — CYANOCOBALAMIN TAB 1000 MCG SCH MCG: 1000 TAB at 15:18

## 2018-09-15 RX ADMIN — IPRATROPIUM BROMIDE AND ALBUTEROL SULFATE SCH ML: .5; 3 SOLUTION RESPIRATORY (INHALATION) at 11:31

## 2018-09-15 RX ADMIN — VITAMIN D, TAB 1000IU (100/BT) SCH UNIT: 25 TAB at 15:19

## 2018-09-15 RX ADMIN — IPRATROPIUM BROMIDE AND ALBUTEROL SULFATE SCH ML: .5; 3 SOLUTION RESPIRATORY (INHALATION) at 07:40

## 2018-09-15 RX ADMIN — IPRATROPIUM BROMIDE AND ALBUTEROL SULFATE SCH ML: .5; 3 SOLUTION RESPIRATORY (INHALATION) at 15:10

## 2018-09-15 RX ADMIN — DICLOFENAC SODIUM SCH MG: 25 TABLET, DELAYED RELEASE ORAL at 20:04

## 2018-09-15 RX ADMIN — TRAZODONE HYDROCHLORIDE SCH MG: 100 TABLET ORAL at 20:05

## 2018-09-15 RX ADMIN — NITROGLYCERIN SCH INCH: 20 OINTMENT TOPICAL at 12:00

## 2018-09-15 RX ADMIN — ZOLPIDEM TARTRATE PRN MG: 5 TABLET ORAL at 20:05

## 2018-09-15 RX ADMIN — SENNOSIDES AND DOCUSATE SODIUM SCH TAB: 8.6; 5 TABLET ORAL at 15:20

## 2018-09-15 RX ADMIN — GABAPENTIN SCH MG: 300 CAPSULE ORAL at 15:19

## 2018-09-15 RX ADMIN — DICLOFENAC SODIUM SCH MG: 25 TABLET, DELAYED RELEASE ORAL at 09:00

## 2018-09-15 RX ADMIN — CARVEDILOL SCH MG: 6.25 TABLET, FILM COATED ORAL at 17:50

## 2018-09-15 RX ADMIN — ASPIRIN SCH MG: 81 TABLET, COATED ORAL at 12:16

## 2018-09-15 RX ADMIN — NITROGLYCERIN SCH INCH: 20 OINTMENT TOPICAL at 22:53

## 2018-09-15 RX ADMIN — DOCUSATE SODIUM SCH MG: 100 CAPSULE, LIQUID FILLED ORAL at 20:05

## 2018-09-15 NOTE — PDOC
Provider Note


Provider Note


Brief cath report


Normal LV systolic function


Moderate LAD disease.


Ostial borderline lesion in large RCA including a LCX vessel.


Will review and consider possible Impella assisted revascularization.


Discussed with the patient. Full report pending.











KING MOCTEZUMA MD Sep 15, 2018 10:25

## 2018-09-15 NOTE — PDOC
MODERATE SEDATION ASSESSMENT


RISKS/ALTERNATIVES


Risks/Alternatives


Risks and alternatives of this type of sedation and procedure discussed with:


RISK/ALTERNATIVES:  Patient





H & P ON CHART


H & P


H & P on chart and reviewed for co-morbid conditions and appropriate labs.


H&P ON CHART:  Yes





PREGNANCY STATUS


PREG STATUS ASSESSED:  Yes





MEDS/ALLERGIES REVIEWED


Meds/Allergies Reviewed


Medications and Allergies including time and route of recently administered 

narcotics and sedatives.


MEDS/ALLERGIES REVIEWED:  Yes





ASA RATING


ASA RATING:  II





AIRWAY ASSESSMENT


Airway Assessment


Airway patency, oral function limitations, presence  of caps, crowns, dentures, 

partials, and ability to extend neck assessed.


AIRWAY ASSESSMENT:  Yes





MALLAMPATI SCORE


MALLAMPATI SCORE:  II





PRE-SEDATION ASSESSMENT


PRE-SEDATION ASSESSMENT:  Yes











KING MOCTEZUMA MD Sep 15, 2018 09:14

## 2018-09-15 NOTE — PDOC
PULMONARY PROGRESS NOTES


Subjective


sob better, has occ cough, no cp, is on 02 at night


Vitals





Vital Signs








  Date Time  Temp Pulse Resp B/P (MAP) Pulse Ox O2 Delivery O2 Flow Rate FiO2


 


9/15/18 04:46   20   Nasal Cannula 2.0 


 


9/15/18 04:19  82  174/75    


 


9/15/18 03:00 98.2    94   





 98.2       








ROS:  No Nausea, No Chest Pain


General:  Alert, Oriented X4


HEENT:  Other (nc at perrl nose throat clear)


Lungs:  Crackles


Cardiovascular:  S1, S2


Abdomen:  Soft, Non-tender


Neuro Exam:  Alert


Extremities:  No Edema


Skin:  Warm


Labs





Laboratory Tests








Test


 9/13/18


17:30 9/13/18


20:20 9/13/18


20:57 9/14/18


02:30


 


Prothrombin Time


 12.0 SEC


(11.7-14.0) 


 


 





 


Prothromb Time International


Ratio 0.9 (0.8-1.1) 


 


 


 





 


Troponin I Quantitative


 < 0.017 ng/mL


(0.000-0.055) < 0.017 ng/mL


(0.000-0.055) 


 < 0.017 ng/mL


(0.000-0.055)


 


Glucose (Fingerstick)


 


 


 135 mg/dL


(70-99) 





 


White Blood Count


 


 


 


 9.4 x10^3/uL


(4.0-11.0)


 


Red Blood Count


 


 


 


 3.92 x10^6/uL


(3.50-5.40)


 


Hemoglobin


 


 


 


 12.0 g/dL


(12.0-15.5)


 


Hematocrit


 


 


 


 35.6 %


(36.0-47.0)


 


Mean Corpuscular Volume    91 fL () 


 


Mean Corpuscular Hemoglobin    31 pg (25-35) 


 


Mean Corpuscular Hemoglobin


Concent 


 


 


 34 g/dL


(31-37)


 


Red Cell Distribution Width


 


 


 


 13.3 %


(11.5-14.5)


 


Platelet Count


 


 


 


 202 x10^3/uL


(140-400)


 


Neutrophils (%) (Auto)    42 % (31-73) 


 


Lymphocytes (%) (Auto)    43 % (24-48) 


 


Monocytes (%) (Auto)    10 % (0-9) 


 


Eosinophils (%) (Auto)    3 % (0-3) 


 


Basophils (%) (Auto)    1 % (0-3) 


 


Neutrophils # (Auto)


 


 


 


 4.0 x10^3uL


(1.8-7.7)


 


Lymphocytes # (Auto)


 


 


 


 4.0 x10^3/uL


(1.0-4.8)


 


Monocytes # (Auto)


 


 


 


 1.0 x10^3/uL


(0.0-1.1)


 


Eosinophils # (Auto)


 


 


 


 0.3 x10^3/uL


(0.0-0.7)


 


Basophils # (Auto)


 


 


 


 0.1 x10^3/uL


(0.0-0.2)


 


Sodium Level


 


 


 


 144 mmol/L


(136-145)


 


Potassium Level


 


 


 


 4.8 mmol/L


(3.5-5.1)


 


Chloride Level


 


 


 


 105 mmol/L


()


 


Carbon Dioxide Level


 


 


 


 34 mmol/L


(21-32)


 


Anion Gap    5 (6-14) 


 


Blood Urea Nitrogen


 


 


 


 23 mg/dL


(7-20)


 


Creatinine


 


 


 


 1.0 mg/dL


(0.6-1.0)


 


Estimated GFR


(Cockcroft-Gault) 


 


 


 54.7 





 


BUN/Creatinine Ratio    23 (6-20) 


 


Glucose Level


 


 


 


 131 mg/dL


(70-99)


 


Calcium Level


 


 


 


 8.8 mg/dL


(8.5-10.1)


 


Total Bilirubin


 


 


 


 0.3 mg/dL


(0.2-1.0)


 


Aspartate Amino Transf


(AST/SGOT) 


 


 


 24 U/L (15-37) 





 


Alanine Aminotransferase


(ALT/SGPT) 


 


 


 29 U/L (14-59) 





 


Alkaline Phosphatase


 


 


 


 90 U/L


()


 


Total Protein


 


 


 


 6.6 g/dL


(6.4-8.2)


 


Albumin


 


 


 


 3.1 g/dL


(3.4-5.0)


 


Albumin/Globulin Ratio    0.9 (1.0-1.7) 


 


Triglycerides Level


 


 


 


 193 mg/dL


(0-150)


 


Cholesterol Level


 


 


 


 176 mg/dL


(0-200)


 


LDL Cholesterol, Calculated


 


 


 


 98 mg/dL


(0-100)


 


VLDL Cholesterol, Calculated


 


 


 


 39 mg/dL


(0-40)


 


Non-HDL Cholesterol Calculated


 


 


 


 137 mg/dL


(0-129)


 


HDL Cholesterol


 


 


 


 39 mg/dL


(40-60)


 


Cholesterol/HDL Ratio    4.5 


 


Thyroid Stimulating Hormone


(TSH) 


 


 


 1.059 uIU/mL


(0.358-3.74)


 


Test


 9/14/18


08:25 9/14/18


10:35 9/14/18


11:12 9/14/18


16:55


 


Glucose (Fingerstick)


 117 mg/dL


(70-99) 


 152 mg/dL


(70-99) 126 mg/dL


(70-99)


 


Troponin I Quantitative


 


 0.027 ng/mL


(0.000-0.055) 


 





 


Test


 9/14/18


17:45 9/14/18


20:43 9/15/18


05:00 





 


Sodium Level


 142 mmol/L


(136-145) 


 143 mmol/L


(136-145) 





 


Potassium Level


 4.8 mmol/L


(3.5-5.1) 


 5.2 mmol/L


(3.5-5.1) 





 


Chloride Level


 106 mmol/L


() 


 107 mmol/L


() 





 


Carbon Dioxide Level


 32 mmol/L


(21-32) 


 33 mmol/L


(21-32) 





 


Anion Gap 4 (6-14)   3 (6-14)  


 


Blood Urea Nitrogen


 21 mg/dL


(7-20) 


 19 mg/dL


(7-20) 





 


Creatinine


 1.0 mg/dL


(0.6-1.0) 


 0.9 mg/dL


(0.6-1.0) 





 


Estimated GFR


(Cockcroft-Gault) 54.7 


 


 61.7 


 





 


BUN/Creatinine Ratio 21 (6-20)    


 


Glucose Level


 141 mg/dL


(70-99) 


 123 mg/dL


(70-99) 





 


Calcium Level


 8.5 mg/dL


(8.5-10.1) 


 8.5 mg/dL


(8.5-10.1) 





 


Total Bilirubin


 0.2 mg/dL


(0.2-1.0) 


 


 





 


Aspartate Amino Transf


(AST/SGOT) 17 U/L (15-37) 


 


 


 





 


Alanine Aminotransferase


(ALT/SGPT) 25 U/L (14-59) 


 


 


 





 


Alkaline Phosphatase


 77 U/L


() 


 


 





 


Total Protein


 6.0 g/dL


(6.4-8.2) 


 


 





 


Albumin


 2.6 g/dL


(3.4-5.0) 


 


 





 


Albumin/Globulin Ratio 0.8 (1.0-1.7)    


 


Glucose (Fingerstick)


 


 136 mg/dL


(70-99) 


 





 


White Blood Count


 


 


 8.8 x10^3/uL


(4.0-11.0) 





 


Red Blood Count


 


 


 3.55 x10^6/uL


(3.50-5.40) 





 


Hemoglobin


 


 


 10.8 g/dL


(12.0-15.5) 





 


Hematocrit


 


 


 32.5 %


(36.0-47.0) 





 


Mean Corpuscular Volume   92 fL ()  


 


Mean Corpuscular Hemoglobin   30 pg (25-35)  


 


Mean Corpuscular Hemoglobin


Concent 


 


 33 g/dL


(31-37) 





 


Red Cell Distribution Width


 


 


 13.5 %


(11.5-14.5) 





 


Platelet Count


 


 


 182 x10^3/uL


(140-400) 





 


Neutrophils (%) (Auto)   53 % (31-73)  


 


Lymphocytes (%) (Auto)   33 % (24-48)  


 


Monocytes (%) (Auto)   10 % (0-9)  


 


Eosinophils (%) (Auto)   3 % (0-3)  


 


Basophils (%) (Auto)   1 % (0-3)  


 


Neutrophils # (Auto)


 


 


 4.6 x10^3uL


(1.8-7.7) 





 


Lymphocytes # (Auto)


 


 


 2.9 x10^3/uL


(1.0-4.8) 





 


Monocytes # (Auto)


 


 


 0.9 x10^3/uL


(0.0-1.1) 





 


Eosinophils # (Auto)


 


 


 0.3 x10^3/uL


(0.0-0.7) 





 


Basophils # (Auto)


 


 


 0.1 x10^3/uL


(0.0-0.2) 











Laboratory Tests








Test


 9/14/18


08:25 9/14/18


10:35 9/14/18


11:12 9/14/18


16:55


 


Glucose (Fingerstick)


 117 mg/dL


(70-99) 


 152 mg/dL


(70-99) 126 mg/dL


(70-99)


 


Troponin I Quantitative


 


 0.027 ng/mL


(0.000-0.055) 


 





 


Test


 9/14/18


17:45 9/14/18


20:43 9/15/18


05:00 





 


Sodium Level


 142 mmol/L


(136-145) 


 143 mmol/L


(136-145) 





 


Potassium Level


 4.8 mmol/L


(3.5-5.1) 


 5.2 mmol/L


(3.5-5.1) 





 


Chloride Level


 106 mmol/L


() 


 107 mmol/L


() 





 


Carbon Dioxide Level


 32 mmol/L


(21-32) 


 33 mmol/L


(21-32) 





 


Anion Gap 4 (6-14)   3 (6-14)  


 


Blood Urea Nitrogen


 21 mg/dL


(7-20) 


 19 mg/dL


(7-20) 





 


Creatinine


 1.0 mg/dL


(0.6-1.0) 


 0.9 mg/dL


(0.6-1.0) 





 


Estimated GFR


(Cockcroft-Gault) 54.7 


 


 61.7 


 





 


BUN/Creatinine Ratio 21 (6-20)    


 


Glucose Level


 141 mg/dL


(70-99) 


 123 mg/dL


(70-99) 





 


Calcium Level


 8.5 mg/dL


(8.5-10.1) 


 8.5 mg/dL


(8.5-10.1) 





 


Total Bilirubin


 0.2 mg/dL


(0.2-1.0) 


 


 





 


Aspartate Amino Transf


(AST/SGOT) 17 U/L (15-37) 


 


 


 





 


Alanine Aminotransferase


(ALT/SGPT) 25 U/L (14-59) 


 


 


 





 


Alkaline Phosphatase


 77 U/L


() 


 


 





 


Total Protein


 6.0 g/dL


(6.4-8.2) 


 


 





 


Albumin


 2.6 g/dL


(3.4-5.0) 


 


 





 


Albumin/Globulin Ratio 0.8 (1.0-1.7)    


 


Glucose (Fingerstick)


 


 136 mg/dL


(70-99) 


 





 


White Blood Count


 


 


 8.8 x10^3/uL


(4.0-11.0) 





 


Red Blood Count


 


 


 3.55 x10^6/uL


(3.50-5.40) 





 


Hemoglobin


 


 


 10.8 g/dL


(12.0-15.5) 





 


Hematocrit


 


 


 32.5 %


(36.0-47.0) 





 


Mean Corpuscular Volume   92 fL ()  


 


Mean Corpuscular Hemoglobin   30 pg (25-35)  


 


Mean Corpuscular Hemoglobin


Concent 


 


 33 g/dL


(31-37) 





 


Red Cell Distribution Width


 


 


 13.5 %


(11.5-14.5) 





 


Platelet Count


 


 


 182 x10^3/uL


(140-400) 





 


Neutrophils (%) (Auto)   53 % (31-73)  


 


Lymphocytes (%) (Auto)   33 % (24-48)  


 


Monocytes (%) (Auto)   10 % (0-9)  


 


Eosinophils (%) (Auto)   3 % (0-3)  


 


Basophils (%) (Auto)   1 % (0-3)  


 


Neutrophils # (Auto)


 


 


 4.6 x10^3uL


(1.8-7.7) 





 


Lymphocytes # (Auto)


 


 


 2.9 x10^3/uL


(1.0-4.8) 





 


Monocytes # (Auto)


 


 


 0.9 x10^3/uL


(0.0-1.1) 





 


Eosinophils # (Auto)


 


 


 0.3 x10^3/uL


(0.0-0.7) 





 


Basophils # (Auto)


 


 


 0.1 x10^3/uL


(0.0-0.2) 











Medications





Active Scripts








 Medications  Dose


 Route/Sig


 Max Daily Dose Days Date Category


 


 Diclofenac Sodium


 75 Mg Tablet.dr  75 Mg


 PO BID


   9/13/18 Reported


 


 Citalopram Hbr


  (Citalopram


 Hydrobromide) 10


 Mg Tablet  10 Mg


 PO DAILY


   9/13/18 Reported


 


 Losartan


 Potassium 100 Mg


 Tablet  100 Mg


 PO DAILY


   9/13/18 Reported


 


 Ipratropium


 Bromide 0.2 Mg/1


 Ml Solution  0.2 Mg


 IH Q8HRS


   9/13/18 Reported


 


 Meclizine Hcl 25


 Mg Tablet  25 Mg


 PO QID PRN


   9/13/18 Reported


 


 Spiriva


  (Tiotropium


 Bromide) 18 Mcg


 Cap.w.dev  1 Cap


 IH DAILY


   9/13/18 Reported


 


 Calcium (Calcium


 Carbonate) 600 Mg


 Tablet  600 Mg


 PO DAILY


   9/13/18 Reported


 


 Carvedilol 6.25


 Mg Tablet  1 Tab


 PO BID


   1/25/18 Reported


 


 Vitamin D3


  (Cholecalciferol


  (Vitamin D3))


 1,000 Unit Tablet  2,000 Unit


 PO DAILY


   1/25/18 Reported


 


 Vitamin B-12


  (Cyanocobalamin


  (Vitamin B-12))


 1,000 Mcg Tablet  5,000 Mcg


 PO DAILY


   1/25/18 Reported


 


 Omeprazole 40 Mg


 Capsule.dr  40 Mg


 PO DAILY


   1/25/18 Reported


 


 Atorvastatin


 Calcium 80 Mg


 Tablet  80 Mg


 PO HS


   1/25/18 Reported


 


 Fluticasone


 Propionate Nasal


 Spray


  (Fluticasone


 Propionate) 16 Gm


 Spray.susp  1 Spray


 NS BID


   1/25/18 Reported


 


 Trazodone Hcl 100


 Mg Tablet  1 Tab


 PO QHS


   3/16/16 Reported


 


 Gabapentin 600 Mg


 Tablet  600 Mg


 PO TID


   3/16/16 Reported


 


 Aspir 81


  (Aspirin) 81 Mg


 Tablet.dr  1 Tab


 PO DAILY


   3/16/16 Reported








Comments


1. No evidence of pulmonary embolism.


 


2. Small nodules identified in the right upper lobe, right middle lobe of 


lung with the largest measuring 4 mm. Follow-up per Fleischner [employer] 


guidelines follow-up CT in 6 months.


 


3. Mild left lung base airspace opacity likely atelectasis or infiltrate.


 


4. Moderate emphysematous changes identified in the lungs.


 


5. A 1.8 cm nodule identified in the left adrenal gland likely lipid rich 


adrenal adenoma.


 


6. 1 cm hypodense nodule identified in the right lobe of the thyroid 


gland. Recommend follow-up ultrasound thyroid.





Impression


.


IMPRESSION:


1.  Abnormal VQ scan read out as high probability, cta no pe, has cp, cardiac 

cath today per cardiology


2.  Chronic obstructive pulmonary disease.


3.  Chronic respiratory failure.


4.  Protein malnutrition, present upon admission.


5.  Atypical chest pain, suspect secondary to reflux.


6.  Coronary artery disease with previous myocardial infarction.


7.  Gastroesophageal reflux.





Plan


.


PLAN:


1.  CT angiogram, venous Dopplers of the lower


extremities were negative:  lovenox changed to prophylaxis


2.  Follow up with her pulmonologist in [place].


3.  Follow up with her primary care doctor, Dr. Leach.


4. cardiac cath today 


5.  BD





discussed w pt and rn











LAURIE REYNA MD Sep 15, 2018 06:59

## 2018-09-15 NOTE — PDOC
PROGRESS NOTES


Chief Complaint


Chief Complaint


CC:


Chest pain


MI, 2 stents 


CAD


HTN


GERD


Osteoarthritis


Chronic renal insufficiency 


Appendectomy


Cholecystectomy


Tonsillectomy


Hysterectomy


<1 ppd smoker





History of Present Illness


History of Present Illness


Pt. seen and examined


Dw RN


Family present at bedside


Pt sleepy





Vitals


Vitals





Vital Signs








  Date Time  Temp Pulse Resp B/P (MAP) Pulse Ox O2 Delivery O2 Flow Rate FiO2


 


9/15/18 12:15  72  145/58    


 


9/15/18 12:14   18  88 Room Air 6.0 


 


9/15/18 07:00 99.0       





 99.0       











Physical Exam


General:  Cooperative, No acute distress


Heart:  Normal S1, Normal S2, No murmurs


Lungs:  Crackles


Abdomen:  Soft, No tenderness


Extremities:  No edema, Normal pulses


Skin:  No rashes





Labs


LABS





Laboratory Tests








Test


 9/14/18


16:55 9/14/18


17:45 9/14/18


20:43 9/15/18


05:00


 


Glucose (Fingerstick)


 126 mg/dL


(70-99) 


 136 mg/dL


(70-99) 





 


Sodium Level


 


 142 mmol/L


(136-145) 


 143 mmol/L


(136-145)


 


Potassium Level


 


 4.8 mmol/L


(3.5-5.1) 


 5.2 mmol/L


(3.5-5.1)


 


Chloride Level


 


 106 mmol/L


() 


 107 mmol/L


()


 


Carbon Dioxide Level


 


 32 mmol/L


(21-32) 


 33 mmol/L


(21-32)


 


Anion Gap  4 (6-14)   3 (6-14) 


 


Blood Urea Nitrogen


 


 21 mg/dL


(7-20) 


 19 mg/dL


(7-20)


 


Creatinine


 


 1.0 mg/dL


(0.6-1.0) 


 0.9 mg/dL


(0.6-1.0)


 


Estimated GFR


(Cockcroft-Gault) 


 54.7 


 


 61.7 





 


BUN/Creatinine Ratio  21 (6-20)   


 


Glucose Level


 


 141 mg/dL


(70-99) 


 123 mg/dL


(70-99)


 


Calcium Level


 


 8.5 mg/dL


(8.5-10.1) 


 8.5 mg/dL


(8.5-10.1)


 


Total Bilirubin


 


 0.2 mg/dL


(0.2-1.0) 


 





 


Aspartate Amino Transf


(AST/SGOT) 


 17 U/L (15-37) 


 


 





 


Alanine Aminotransferase


(ALT/SGPT) 


 25 U/L (14-59) 


 


 





 


Alkaline Phosphatase


 


 77 U/L


() 


 





 


Total Protein


 


 6.0 g/dL


(6.4-8.2) 


 





 


Albumin


 


 2.6 g/dL


(3.4-5.0) 


 





 


Albumin/Globulin Ratio  0.8 (1.0-1.7)   


 


White Blood Count


 


 


 


 8.8 x10^3/uL


(4.0-11.0)


 


Red Blood Count


 


 


 


 3.55 x10^6/uL


(3.50-5.40)


 


Hemoglobin


 


 


 


 10.8 g/dL


(12.0-15.5)


 


Hematocrit


 


 


 


 32.5 %


(36.0-47.0)


 


Mean Corpuscular Volume    92 fL () 


 


Mean Corpuscular Hemoglobin    30 pg (25-35) 


 


Mean Corpuscular Hemoglobin


Concent 


 


 


 33 g/dL


(31-37)


 


Red Cell Distribution Width


 


 


 


 13.5 %


(11.5-14.5)


 


Platelet Count


 


 


 


 182 x10^3/uL


(140-400)


 


Neutrophils (%) (Auto)    53 % (31-73) 


 


Lymphocytes (%) (Auto)    33 % (24-48) 


 


Monocytes (%) (Auto)    10 % (0-9) 


 


Eosinophils (%) (Auto)    3 % (0-3) 


 


Basophils (%) (Auto)    1 % (0-3) 


 


Neutrophils # (Auto)


 


 


 


 4.6 x10^3uL


(1.8-7.7)


 


Lymphocytes # (Auto)


 


 


 


 2.9 x10^3/uL


(1.0-4.8)


 


Monocytes # (Auto)


 


 


 


 0.9 x10^3/uL


(0.0-1.1)


 


Eosinophils # (Auto)


 


 


 


 0.3 x10^3/uL


(0.0-0.7)


 


Basophils # (Auto)


 


 


 


 0.1 x10^3/uL


(0.0-0.2)


 


Test


 9/15/18


07:22 


 


 





 


Glucose (Fingerstick)


 117 mg/dL


(70-99) 


 


 














Review of Systems


Review of Systems


CO pain


CO fatigue





Assessment and Plan


Assessmemt and Plan


Chest pain


MI, 2 stents 


CAD


HTN


GERD


Osteoarthritis


Chronic renal insufficiency 


Appendectomy


Cholecystectomy


Tonsillectomy


Hysterectomy


<1 ppd smoker





Plan:


Cardiac monitoring


Labs


PT/OT


Home meds


Viability study tomorrow


Cath Monday pending viability study


Appreciate subspecialist input





Comment


Review of Relevant


I have reviewed the following items irma (where applicable) has been applied.


Labs





Laboratory Tests








Test


 9/13/18


17:30 9/13/18


20:20 9/13/18


20:57 9/14/18


02:30


 


Prothrombin Time


 12.0 SEC


(11.7-14.0) 


 


 





 


Prothromb Time International


Ratio 0.9 (0.8-1.1) 


 


 


 





 


Troponin I Quantitative


 < 0.017 ng/mL


(0.000-0.055) < 0.017 ng/mL


(0.000-0.055) 


 < 0.017 ng/mL


(0.000-0.055)


 


Glucose (Fingerstick)


 


 


 135 mg/dL


(70-99) 





 


White Blood Count


 


 


 


 9.4 x10^3/uL


(4.0-11.0)


 


Red Blood Count


 


 


 


 3.92 x10^6/uL


(3.50-5.40)


 


Hemoglobin


 


 


 


 12.0 g/dL


(12.0-15.5)


 


Hematocrit


 


 


 


 35.6 %


(36.0-47.0)


 


Mean Corpuscular Volume    91 fL () 


 


Mean Corpuscular Hemoglobin    31 pg (25-35) 


 


Mean Corpuscular Hemoglobin


Concent 


 


 


 34 g/dL


(31-37)


 


Red Cell Distribution Width


 


 


 


 13.3 %


(11.5-14.5)


 


Platelet Count


 


 


 


 202 x10^3/uL


(140-400)


 


Neutrophils (%) (Auto)    42 % (31-73) 


 


Lymphocytes (%) (Auto)    43 % (24-48) 


 


Monocytes (%) (Auto)    10 % (0-9) 


 


Eosinophils (%) (Auto)    3 % (0-3) 


 


Basophils (%) (Auto)    1 % (0-3) 


 


Neutrophils # (Auto)


 


 


 


 4.0 x10^3uL


(1.8-7.7)


 


Lymphocytes # (Auto)


 


 


 


 4.0 x10^3/uL


(1.0-4.8)


 


Monocytes # (Auto)


 


 


 


 1.0 x10^3/uL


(0.0-1.1)


 


Eosinophils # (Auto)


 


 


 


 0.3 x10^3/uL


(0.0-0.7)


 


Basophils # (Auto)


 


 


 


 0.1 x10^3/uL


(0.0-0.2)


 


Sodium Level


 


 


 


 144 mmol/L


(136-145)


 


Potassium Level


 


 


 


 4.8 mmol/L


(3.5-5.1)


 


Chloride Level


 


 


 


 105 mmol/L


()


 


Carbon Dioxide Level


 


 


 


 34 mmol/L


(21-32)


 


Anion Gap    5 (6-14) 


 


Blood Urea Nitrogen


 


 


 


 23 mg/dL


(7-20)


 


Creatinine


 


 


 


 1.0 mg/dL


(0.6-1.0)


 


Estimated GFR


(Cockcroft-Gault) 


 


 


 54.7 





 


BUN/Creatinine Ratio    23 (6-20) 


 


Glucose Level


 


 


 


 131 mg/dL


(70-99)


 


Calcium Level


 


 


 


 8.8 mg/dL


(8.5-10.1)


 


Total Bilirubin


 


 


 


 0.3 mg/dL


(0.2-1.0)


 


Aspartate Amino Transf


(AST/SGOT) 


 


 


 24 U/L (15-37) 





 


Alanine Aminotransferase


(ALT/SGPT) 


 


 


 29 U/L (14-59) 





 


Alkaline Phosphatase


 


 


 


 90 U/L


()


 


Total Protein


 


 


 


 6.6 g/dL


(6.4-8.2)


 


Albumin


 


 


 


 3.1 g/dL


(3.4-5.0)


 


Albumin/Globulin Ratio    0.9 (1.0-1.7) 


 


Triglycerides Level


 


 


 


 193 mg/dL


(0-150)


 


Cholesterol Level


 


 


 


 176 mg/dL


(0-200)


 


LDL Cholesterol, Calculated


 


 


 


 98 mg/dL


(0-100)


 


VLDL Cholesterol, Calculated


 


 


 


 39 mg/dL


(0-40)


 


Non-HDL Cholesterol Calculated


 


 


 


 137 mg/dL


(0-129)


 


HDL Cholesterol


 


 


 


 39 mg/dL


(40-60)


 


Cholesterol/HDL Ratio    4.5 


 


Thyroid Stimulating Hormone


(TSH) 


 


 


 1.059 uIU/mL


(0.358-3.74)


 


Test


 9/14/18


08:25 9/14/18


10:35 9/14/18


11:12 9/14/18


16:55


 


Glucose (Fingerstick)


 117 mg/dL


(70-99) 


 152 mg/dL


(70-99) 126 mg/dL


(70-99)


 


Troponin I Quantitative


 


 0.027 ng/mL


(0.000-0.055) 


 





 


Test


 9/14/18


17:45 9/14/18


20:43 9/15/18


05:00 9/15/18


07:22


 


Sodium Level


 142 mmol/L


(136-145) 


 143 mmol/L


(136-145) 





 


Potassium Level


 4.8 mmol/L


(3.5-5.1) 


 5.2 mmol/L


(3.5-5.1) 





 


Chloride Level


 106 mmol/L


() 


 107 mmol/L


() 





 


Carbon Dioxide Level


 32 mmol/L


(21-32) 


 33 mmol/L


(21-32) 





 


Anion Gap 4 (6-14)   3 (6-14)  


 


Blood Urea Nitrogen


 21 mg/dL


(7-20) 


 19 mg/dL


(7-20) 





 


Creatinine


 1.0 mg/dL


(0.6-1.0) 


 0.9 mg/dL


(0.6-1.0) 





 


Estimated GFR


(Cockcroft-Gault) 54.7 


 


 61.7 


 





 


BUN/Creatinine Ratio 21 (6-20)    


 


Glucose Level


 141 mg/dL


(70-99) 


 123 mg/dL


(70-99) 





 


Calcium Level


 8.5 mg/dL


(8.5-10.1) 


 8.5 mg/dL


(8.5-10.1) 





 


Total Bilirubin


 0.2 mg/dL


(0.2-1.0) 


 


 





 


Aspartate Amino Transf


(AST/SGOT) 17 U/L (15-37) 


 


 


 





 


Alanine Aminotransferase


(ALT/SGPT) 25 U/L (14-59) 


 


 


 





 


Alkaline Phosphatase


 77 U/L


() 


 


 





 


Total Protein


 6.0 g/dL


(6.4-8.2) 


 


 





 


Albumin


 2.6 g/dL


(3.4-5.0) 


 


 





 


Albumin/Globulin Ratio 0.8 (1.0-1.7)    


 


Glucose (Fingerstick)


 


 136 mg/dL


(70-99) 


 117 mg/dL


(70-99)


 


White Blood Count


 


 


 8.8 x10^3/uL


(4.0-11.0) 





 


Red Blood Count


 


 


 3.55 x10^6/uL


(3.50-5.40) 





 


Hemoglobin


 


 


 10.8 g/dL


(12.0-15.5) 





 


Hematocrit


 


 


 32.5 %


(36.0-47.0) 





 


Mean Corpuscular Volume   92 fL ()  


 


Mean Corpuscular Hemoglobin   30 pg (25-35)  


 


Mean Corpuscular Hemoglobin


Concent 


 


 33 g/dL


(31-37) 





 


Red Cell Distribution Width


 


 


 13.5 %


(11.5-14.5) 





 


Platelet Count


 


 


 182 x10^3/uL


(140-400) 





 


Neutrophils (%) (Auto)   53 % (31-73)  


 


Lymphocytes (%) (Auto)   33 % (24-48)  


 


Monocytes (%) (Auto)   10 % (0-9)  


 


Eosinophils (%) (Auto)   3 % (0-3)  


 


Basophils (%) (Auto)   1 % (0-3)  


 


Neutrophils # (Auto)


 


 


 4.6 x10^3uL


(1.8-7.7) 





 


Lymphocytes # (Auto)


 


 


 2.9 x10^3/uL


(1.0-4.8) 





 


Monocytes # (Auto)


 


 


 0.9 x10^3/uL


(0.0-1.1) 





 


Eosinophils # (Auto)


 


 


 0.3 x10^3/uL


(0.0-0.7) 





 


Basophils # (Auto)


 


 


 0.1 x10^3/uL


(0.0-0.2) 











Laboratory Tests








Test


 9/14/18


16:55 9/14/18


17:45 9/14/18


20:43 9/15/18


05:00


 


Glucose (Fingerstick)


 126 mg/dL


(70-99) 


 136 mg/dL


(70-99) 





 


Sodium Level


 


 142 mmol/L


(136-145) 


 143 mmol/L


(136-145)


 


Potassium Level


 


 4.8 mmol/L


(3.5-5.1) 


 5.2 mmol/L


(3.5-5.1)


 


Chloride Level


 


 106 mmol/L


() 


 107 mmol/L


()


 


Carbon Dioxide Level


 


 32 mmol/L


(21-32) 


 33 mmol/L


(21-32)


 


Anion Gap  4 (6-14)   3 (6-14) 


 


Blood Urea Nitrogen


 


 21 mg/dL


(7-20) 


 19 mg/dL


(7-20)


 


Creatinine


 


 1.0 mg/dL


(0.6-1.0) 


 0.9 mg/dL


(0.6-1.0)


 


Estimated GFR


(Cockcroft-Gault) 


 54.7 


 


 61.7 





 


BUN/Creatinine Ratio  21 (6-20)   


 


Glucose Level


 


 141 mg/dL


(70-99) 


 123 mg/dL


(70-99)


 


Calcium Level


 


 8.5 mg/dL


(8.5-10.1) 


 8.5 mg/dL


(8.5-10.1)


 


Total Bilirubin


 


 0.2 mg/dL


(0.2-1.0) 


 





 


Aspartate Amino Transf


(AST/SGOT) 


 17 U/L (15-37) 


 


 





 


Alanine Aminotransferase


(ALT/SGPT) 


 25 U/L (14-59) 


 


 





 


Alkaline Phosphatase


 


 77 U/L


() 


 





 


Total Protein


 


 6.0 g/dL


(6.4-8.2) 


 





 


Albumin


 


 2.6 g/dL


(3.4-5.0) 


 





 


Albumin/Globulin Ratio  0.8 (1.0-1.7)   


 


White Blood Count


 


 


 


 8.8 x10^3/uL


(4.0-11.0)


 


Red Blood Count


 


 


 


 3.55 x10^6/uL


(3.50-5.40)


 


Hemoglobin


 


 


 


 10.8 g/dL


(12.0-15.5)


 


Hematocrit


 


 


 


 32.5 %


(36.0-47.0)


 


Mean Corpuscular Volume    92 fL () 


 


Mean Corpuscular Hemoglobin    30 pg (25-35) 


 


Mean Corpuscular Hemoglobin


Concent 


 


 


 33 g/dL


(31-37)


 


Red Cell Distribution Width


 


 


 


 13.5 %


(11.5-14.5)


 


Platelet Count


 


 


 


 182 x10^3/uL


(140-400)


 


Neutrophils (%) (Auto)    53 % (31-73) 


 


Lymphocytes (%) (Auto)    33 % (24-48) 


 


Monocytes (%) (Auto)    10 % (0-9) 


 


Eosinophils (%) (Auto)    3 % (0-3) 


 


Basophils (%) (Auto)    1 % (0-3) 


 


Neutrophils # (Auto)


 


 


 


 4.6 x10^3uL


(1.8-7.7)


 


Lymphocytes # (Auto)


 


 


 


 2.9 x10^3/uL


(1.0-4.8)


 


Monocytes # (Auto)


 


 


 


 0.9 x10^3/uL


(0.0-1.1)


 


Eosinophils # (Auto)


 


 


 


 0.3 x10^3/uL


(0.0-0.7)


 


Basophils # (Auto)


 


 


 


 0.1 x10^3/uL


(0.0-0.2)


 


Test


 9/15/18


07:22 


 


 





 


Glucose (Fingerstick)


 117 mg/dL


(70-99) 


 


 











Medications





Current Medications


Aspirin (Alexia Aspirin) 325 mg 1X  ONCE PO  Last administered on 9/13/18at 18:16

;  Start 9/13/18 at 17:30;  Stop 9/13/18 at 17:31;  Status DC


Nitroglycerin (Nitrostat) 0.4 mg PRN Q5MIN  PRN SL CHEST PAIN;  Start 9/13/18 

at 17:30;  Stop 9/15/18 at 10:31;  Status DC


Nitroglycerin (Nitro-Bid Oint) 1 inch Q6HRS TP  Last administered on 9/15/18at 

04:19;  Start 9/13/18 at 18:00


Morphine Sulfate (Morphine Sulfate) 1 mg PRN Q10MIN  PRN IV CHEST PAIN Last 

administered on 9/15/18at 04:16;  Start 9/13/18 at 17:30


Acetaminophen (Tylenol) 650 mg PRN Q6HRS  PRN PO MILD PAIN / TEMP;  Start 9/13/ 18 at 17:30


Ondansetron HCl (Zofran) 4 mg PRN Q6HRS  PRN IV NAUSEA/VOMITING Last 

administered on 9/14/18at 01:56;  Start 9/13/18 at 17:30


Prochlorperazine (Compazine) 25 mg PRN Q12HR  PRN CT NAUSEA/VOMITING;  Start 9/ 13/18 at 17:30


Zolpidem Tartrate (Ambien) 5 mg PRN QHS  PRN PO INSOMNIA, 1ST CHOICE Last 

administered on 9/14/18at 20:35;  Start 9/13/18 at 17:30


Enoxaparin Sodium (Lovenox 40mg Syringe) 40 mg Q12HR SQ ;  Start 9/13/18 at 21:

00;  Status Cancel


Sodium Chloride (Normal Saline Flush) 3 ml QSHIFT  PRN IV AFTER MEDS AND BLOOD 

DRAWS;  Start 9/13/18 at 17:30;  Stop 9/15/18 at 10:32;  Status DC


Sodium Chloride 1,000 ml @  100 mls/hr Q10H IV  Last administered on 9/14/18at 

20:39;  Start 9/13/18 at 17:21


Senna/Docusate Sodium (Senna Plus) 1 tab BID PO  Last administered on 9/14/18at 

20:39;  Start 9/13/18 at 21:00


Docusate Sodium (Colace) 100 mg BID PO  Last administered on 9/14/18at 20:34;  

Start 9/13/18 at 21:00


Magnesium Hydroxide (Milk Of Magnesia) 2,400 mg PRN Q12HR  PRN PO CONSTIPATION;

  Start 9/13/18 at 17:30


Aspirin (Ecotrin) 81 mg DAILY PO  Last administered on 9/15/18at 12:16;  Start 9 /14/18 at 09:00


Carvedilol (Coreg) 6.25 mg BIDWMEALS PO  Last administered on 9/15/18at 12:13;  

Start 9/13/18 at 18:00


Vitamin D (Vitamin D3) 2,000 unit DAILY PO  Last administered on 9/14/18at 10:22

;  Start 9/14/18 at 09:00


Citalopram Hydrobromide (CeleXA) 10 mg DAILY PO  Last administered on 9/14/18at 

10:22;  Start 9/14/18 at 09:00


Cyanocobalamin (Vitamin B-12) 5,000 mcg DAILY PO  Last administered on 9/14/ 18at 10:21;  Start 9/14/18 at 09:00


Fluticasone Propionate (Flonase) 1 spray BID NS  Last administered on 9/15/18at 

12:29;  Start 9/13/18 at 21:00


Ipratropium Bromide (Atrovent) 0.2 mg Q8HRS IH ;  Start 9/13/18 at 22:00;  

Status Cancel


Trazodone HCl (Desyrel) 100 mg QHS PO  Last administered on 9/14/18at 20:35;  

Start 9/13/18 at 21:00


Atorvastatin Calcium (Lipitor) 80 mg QHS PO  Last administered on 9/14/18at 20:

35;  Start 9/13/18 at 21:00


Calcium Carbonate/ Glycine (Oscal) 500 mg DAILY PO  Last administered on 9/14/ 18at 10:18;  Start 9/14/18 at 09:00


Diclofenac Sodium (Voltaren) 75 mg BID PO  Last administered on 9/14/18at 20:35

;  Start 9/13/18 at 21:00


Gabapentin (Neurontin) 600 mg TID PO  Last administered on 9/14/18at 20:35;  

Start 9/13/18 at 21:00


Losartan Potassium (Cozaar) 100 mg DAILY PO  Last administered on 9/15/18at 12:

15;  Start 9/14/18 at 09:00


Meclizine HCl (Antivert) 25 mg PRN TID  PRN PO DIZZINESS;  Start 9/13/18 at 18:

00


Pantoprazole Sodium (Protonix) 40 mg DAILYAC PO  Last administered on 9/15/18at 

12:13;  Start 9/14/18 at 07:30


Albuterol/ Ipratropium (Duoneb) 3 ml RTQID NEB  Last administered on 9/15/18at 

07:40;  Start 9/13/18 at 20:00


Enoxaparin Sodium (Lovenox 80mg Syringe) 80 mg Q12HR SQ  Last administered on 9/ 13/18at 18:18;  Start 9/13/18 at 19:01;  Stop 9/14/18 at 07:18;  Status DC


Enalaprilat (Vasotec Inj) 1.25 mg PRN Q6HRS  PRN IVP HYPERTENSION, SEE COMMENTS 

Last administered on 9/14/18at 03:30;  Start 9/14/18 at 03:15


Enoxaparin Sodium (Lovenox 80mg Syringe) 70 mg Q12HR SQ  Last administered on 9/ 14/18at 10:23;  Start 9/14/18 at 09:00;  Stop 9/14/18 at 18:44;  Status DC


Iohexol (Omnipaque 300 Mg/ml) 60 ml 1X  ONCE IV  Last administered on 9/14/18at 

11:52;  Start 9/14/18 at 11:30;  Stop 9/14/18 at 11:31;  Status DC


Info (CONTRAST GIVEN -- Rx MONITORING) 1 each PRN DAILY  PRN MC SEE COMMENTS;  

Start 9/14/18 at 11:30;  Stop 9/16/18 at 11:29


Guaifenesin (Robitussin Dm) 10 ml PRN Q6HRS  PRN PO COUGH 1ST CHOICE;  Start 9/ 14/18 at 17:30


Acetaminophen/ Hydrocodone Bitart (Lortab 5/325) 1 tab PRN Q4HRS  PRN PO 

MODERATE - SEVERE PAIN Last administered on 9/15/18at 12:14;  Start 9/14/18 at 

17:30


Temazepam (Restoril) 7.5 mg PRN QHS  PRN PO INSOMNIA, 2ND CHOICE;  Start 9/14/ 18 at 17:30


Enoxaparin Sodium (Lovenox 40mg Syringe) 40 mg Q24H SQ  Last administered on 9/ 14/18at 20:36;  Start 9/14/18 at 21:00


Iodixanol (Visipaque 320) 100 ml STK-MED ONCE .ROUTE ;  Start 9/15/18 at 08:44;

  Stop 9/15/18 at 08:45;  Status DC


Heparin Sodium/ Sodium Chloride 1,000 ml @  As Directed STK-MED ONCE .ROUTE ;  

Start 9/15/18 at 08:45;  Stop 9/15/18 at 08:46;  Status DC


Lidocaine HCl (Xylocaine 1% Pf 30ml Vial) 30 ml STK-MED ONCE .ROUTE ;  Start 9/

15/18 at 08:49;  Stop 9/15/18 at 08:50;  Status DC


Fentanyl Citrate (Fentanyl 2ml Vial) 100 mcg STK-MED ONCE .ROUTE ;  Start 9/15/

18 at 08:52;  Stop 9/15/18 at 08:53;  Status DC


Midazolam HCl (Versed) 5 mg STK-MED ONCE .ROUTE ;  Start 9/15/18 at 08:52;  

Stop 9/15/18 at 08:53;  Status DC


Heparin Sodium/ Sodium Chloride (HEPARIN for ARTERIAL LINE FLUSH) 1,000 unit 1X

  ONCE IART  Last administered on 9/15/18at 10:10;  Start 9/15/18 at 09:45;  

Stop 9/15/18 at 09:47;  Status DC


Heparin Sodium/ Sodium Chloride (HEPARIN for ARTERIAL LINE FLUSH) 1,000 unit 1X

  ONCE IART  Last administered on 9/15/18at 10:10;  Start 9/15/18 at 09:45;  

Stop 9/15/18 at 09:47;  Status DC


Midazolam HCl (Versed) 5 mg 1X  ONCE IV  Last administered on 9/15/18at 10:11;  

Start 9/15/18 at 09:45;  Stop 9/15/18 at 09:47;  Status DC


Fentanyl Citrate (Fentanyl 2ml Vial) 100 mcg 1X  ONCE IV  Last administered on 9

/15/18at 10:11;  Start 9/15/18 at 09:45;  Stop 9/15/18 at 09:47;  Status DC


Iodixanol (Visipaque 320) 100 ml 1X  ONCE IART  Last administered on 9/15/18at 

10:10;  Start 9/15/18 at 09:45;  Stop 9/15/18 at 09:47;  Status DC


Lidocaine HCl (Xylocaine 1% Pf 30ml Vial) 30 ml 1X  ONCE INJ  Last administered 

on 9/15/18at 10:10;  Start 9/15/18 at 09:45;  Stop 9/15/18 at 09:47;  Status DC


Sodium Chloride (Normal Saline Flush) 3 ml QSHIFT  PRN IV AFTER MEDS AND BLOOD 

DRAWS;  Start 9/15/18 at 10:30


Sodium Chloride 1,000 ml @  75 mls/hr [id number] IV ;  Start 9/15/18 at 10:26;  

Stop 9/15/18 at 18:25


Nitroglycerin (Nitrostat) 0.4 mg PRN Q5MIN  PRN SL CHEST PAIN Last administered 

on 9/15/18at 11:56;  Start 9/15/18 at 10:30





Active Scripts


Active


Reported


Diclofenac Sodium 75 Mg Tablet.dr 75 Mg PO BID


Citalopram Hbr (Citalopram Hydrobromide) 10 Mg Tablet 10 Mg PO DAILY


Losartan Potassium 100 Mg Tablet 100 Mg PO DAILY


Ipratropium Bromide 0.2 Mg/1 Ml Solution 0.2 Mg IH Q8HRS


Meclizine Hcl 25 Mg Tablet 25 Mg PO QID PRN


Spiriva (Tiotropium Bromide) 18 Mcg Cap.w.dev 1 Cap IH DAILY


Calcium (Calcium Carbonate) 600 Mg Tablet 600 Mg PO DAILY


Carvedilol 6.25 Mg Tablet 1 Tab PO BID


Vitamin D3 (Cholecalciferol (Vitamin D3)) 1,000 Unit Tablet 2,000 Unit PO DAILY


Vitamin B-12 (Cyanocobalamin (Vitamin B-12)) 1,000 Mcg Tablet 5,000 Mcg PO DAILY


Omeprazole 40 Mg Capsule.dr 40 Mg PO DAILY


Atorvastatin Calcium 80 Mg Tablet 80 Mg PO HS


Fluticasone Propionate Nasal Spray (Fluticasone Propionate) 16 Gm Spray.susp 1 

Spray NS BID


Trazodone Hcl 100 Mg Tablet 1 Tab PO QHS


Gabapentin 600 Mg Tablet 600 Mg PO TID


Aspir 81 (Aspirin) 81 Mg Tablet.dr 1 Tab PO DAILY


Vitals/I & O





Vital Sign - Last 24 Hours








 9/14/18 9/14/18 9/14/18 9/14/18





 14:30 15:00 15:00 17:00


 


Temp   98.4 





   98.4 


 


Pulse   79 


 


Resp 18  14 


 


B/P (MAP)   154/76 (102) 


 


Pulse Ox  95 95 


 


O2 Delivery Room Air   Room Air


 


    





    





 9/14/18 9/14/18 9/14/18 9/14/18





 17:52 18:36 19:00 19:03


 


Temp   98.9 





   98.9 


 


Pulse 79  84 


 


Resp  20 18 


 


B/P (MAP) 154/76  145/37 (73) 


 


Pulse Ox  95 90 


 


O2 Delivery  Room Air Room Air Room Air


 


O2 Flow Rate  2.0  


 


    





    





 9/14/18 9/14/18 9/14/18 9/14/18





 19:05 20:36 21:36 22:59


 


Resp  18 18 


 


B/P (MAP)    181/77


 


Pulse Ox 95   


 


O2 Delivery Room Air Room Air Room Air 





 9/14/18 9/15/18 9/15/18 9/15/18





 23:00 03:00 04:16 04:19


 


Temp 98.2 98.2  





 98.2 98.2  


 


Pulse 90 82  82


 


Resp  20 20 


 


B/P (MAP) 181/77 (111) 174/75 (108)  174/75


 


Pulse Ox 91 94  


 


O2 Delivery Nasal Cannula Nasal Cannula Nasal Cannula 


 


O2 Flow Rate 2.0 2.0 2.0 


 


    





    





 9/15/18 9/15/18 9/15/18 9/15/18





 04:46 07:00 07:30 07:41


 


Temp  99.0  





  99.0  


 


Pulse  81  


 


Resp 20 18  


 


B/P (MAP)  140/70 (93)  


 


Pulse Ox  95  93


 


O2 Delivery Nasal Cannula  Room Air Nasal Cannula


 


O2 Flow Rate 2.0 2.0 6.0 2.0


 


    





    





 9/15/18 9/15/18 9/15/18 9/15/18





 10:07 10:11 11:56 12:13


 


Pulse 72  72 72


 


Resp 14 14  


 


B/P (MAP)   145/58 145/58


 


Pulse Ox 88 88  


 


O2 Delivery Nasal Cannula Nasal Cannula  


 


O2 Flow Rate 6.0 6.0  





 9/15/18 9/15/18  





 12:14 12:15  


 


Pulse  72  


 


Resp 18   


 


B/P (MAP)  145/58  


 


Pulse Ox 88   


 


O2 Delivery Room Air   


 


O2 Flow Rate 6.0   














Intake and Output   


 


 9/14/18 9/14/18 9/15/18





 15:00 23:00 07:00


 


Intake Total  800 ml 


 


Output Total 250 ml 450 ml 


 


Balance -250 ml 350 ml 

















ANGELINA CAMPBELL K III DO Sep 15, 2018 12:51

## 2018-09-16 VITALS — SYSTOLIC BLOOD PRESSURE: 185 MMHG | DIASTOLIC BLOOD PRESSURE: 67 MMHG

## 2018-09-16 VITALS — DIASTOLIC BLOOD PRESSURE: 71 MMHG | SYSTOLIC BLOOD PRESSURE: 133 MMHG

## 2018-09-16 VITALS — DIASTOLIC BLOOD PRESSURE: 68 MMHG | SYSTOLIC BLOOD PRESSURE: 138 MMHG

## 2018-09-16 VITALS — SYSTOLIC BLOOD PRESSURE: 125 MMHG | DIASTOLIC BLOOD PRESSURE: 65 MMHG

## 2018-09-16 VITALS — DIASTOLIC BLOOD PRESSURE: 65 MMHG | SYSTOLIC BLOOD PRESSURE: 139 MMHG

## 2018-09-16 VITALS — DIASTOLIC BLOOD PRESSURE: 70 MMHG | SYSTOLIC BLOOD PRESSURE: 165 MMHG

## 2018-09-16 VITALS — DIASTOLIC BLOOD PRESSURE: 62 MMHG | SYSTOLIC BLOOD PRESSURE: 129 MMHG

## 2018-09-16 LAB
ANION GAP SERPL CALC-SCNC: 0 MMOL/L (ref 6–14)
BASOPHILS # BLD AUTO: 0.1 X10^3/UL (ref 0–0.2)
BASOPHILS NFR BLD: 1 % (ref 0–3)
BUN SERPL-MCNC: 14 MG/DL (ref 7–20)
CALCIUM SERPL-MCNC: 8.5 MG/DL (ref 8.5–10.1)
CHLORIDE SERPL-SCNC: 103 MMOL/L (ref 98–107)
CO2 SERPL-SCNC: 35 MMOL/L (ref 21–32)
CREAT SERPL-MCNC: 0.8 MG/DL (ref 0.6–1)
EOSINOPHIL NFR BLD: 0.1 X10^3/UL (ref 0–0.7)
EOSINOPHIL NFR BLD: 1 % (ref 0–3)
ERYTHROCYTE [DISTWIDTH] IN BLOOD BY AUTOMATED COUNT: 12.9 % (ref 11.5–14.5)
GFR SERPLBLD BASED ON 1.73 SQ M-ARVRAT: 70.7 ML/MIN
GLUCOSE SERPL-MCNC: 131 MG/DL (ref 70–99)
HCT VFR BLD CALC: 32.3 % (ref 36–47)
HGB BLD-MCNC: 10.8 G/DL (ref 12–15.5)
LYMPHOCYTES # BLD: 1.6 X10^3/UL (ref 1–4.8)
LYMPHOCYTES NFR BLD AUTO: 19 % (ref 24–48)
MCH RBC QN AUTO: 31 PG (ref 25–35)
MCHC RBC AUTO-ENTMCNC: 33 G/DL (ref 31–37)
MCV RBC AUTO: 92 FL (ref 79–100)
MONO #: 0.8 X10^3/UL (ref 0–1.1)
MONOCYTES NFR BLD: 10 % (ref 0–9)
NEUT #: 6 X10^3UL (ref 1.8–7.7)
NEUTROPHILS NFR BLD AUTO: 70 % (ref 31–73)
PLATELET # BLD AUTO: 168 X10^3/UL (ref 140–400)
POTASSIUM SERPL-SCNC: 4.7 MMOL/L (ref 3.5–5.1)
RBC # BLD AUTO: 3.53 X10^6/UL (ref 3.5–5.4)
SODIUM SERPL-SCNC: 138 MMOL/L (ref 136–145)
WBC # BLD AUTO: 8.7 X10^3/UL (ref 4–11)

## 2018-09-16 RX ADMIN — IPRATROPIUM BROMIDE AND ALBUTEROL SULFATE SCH ML: .5; 3 SOLUTION RESPIRATORY (INHALATION) at 20:00

## 2018-09-16 RX ADMIN — CARVEDILOL SCH MG: 6.25 TABLET, FILM COATED ORAL at 17:32

## 2018-09-16 RX ADMIN — FLUTICASONE PROPIONATE SCH SPRAY: 50 SPRAY, METERED NASAL at 09:00

## 2018-09-16 RX ADMIN — IPRATROPIUM BROMIDE AND ALBUTEROL SULFATE SCH ML: .5; 3 SOLUTION RESPIRATORY (INHALATION) at 07:51

## 2018-09-16 RX ADMIN — NITROGLYCERIN SCH INCH: 20 OINTMENT TOPICAL at 12:00

## 2018-09-16 RX ADMIN — Medication PRN EACH: at 09:41

## 2018-09-16 RX ADMIN — MORPHINE SULFATE PRN MG: 2 INJECTION, SOLUTION INTRAMUSCULAR; INTRAVENOUS at 21:00

## 2018-09-16 RX ADMIN — ONDANSETRON PRN MG: 2 INJECTION INTRAMUSCULAR; INTRAVENOUS at 11:12

## 2018-09-16 RX ADMIN — IPRATROPIUM BROMIDE AND ALBUTEROL SULFATE SCH ML: .5; 3 SOLUTION RESPIRATORY (INHALATION) at 15:55

## 2018-09-16 RX ADMIN — NITROGLYCERIN SCH INCH: 20 OINTMENT TOPICAL at 17:32

## 2018-09-16 RX ADMIN — NITROGLYCERIN SCH INCH: 20 OINTMENT TOPICAL at 06:05

## 2018-09-16 RX ADMIN — ASPIRIN SCH MG: 81 TABLET, COATED ORAL at 09:00

## 2018-09-16 RX ADMIN — IPRATROPIUM BROMIDE AND ALBUTEROL SULFATE SCH ML: .5; 3 SOLUTION RESPIRATORY (INHALATION) at 12:09

## 2018-09-16 RX ADMIN — DOCUSATE SODIUM SCH MG: 100 CAPSULE, LIQUID FILLED ORAL at 21:00

## 2018-09-16 RX ADMIN — HYDROCODONE BITARTRATE AND ACETAMINOPHEN PRN TAB: 5; 325 TABLET ORAL at 14:46

## 2018-09-16 RX ADMIN — GABAPENTIN SCH MG: 300 CAPSULE ORAL at 21:00

## 2018-09-16 RX ADMIN — DICLOFENAC SODIUM SCH MG: 25 TABLET, DELAYED RELEASE ORAL at 09:00

## 2018-09-16 RX ADMIN — ATORVASTATIN CALCIUM SCH MG: 40 TABLET, FILM COATED ORAL at 21:00

## 2018-09-16 RX ADMIN — GABAPENTIN SCH MG: 300 CAPSULE ORAL at 09:00

## 2018-09-16 RX ADMIN — TRAZODONE HYDROCHLORIDE SCH MG: 100 TABLET ORAL at 21:00

## 2018-09-16 RX ADMIN — ENALAPRILAT PRN MG: 1.25 INJECTION, SOLUTION INTRAVENOUS at 02:06

## 2018-09-16 RX ADMIN — ONDANSETRON PRN MG: 2 INJECTION INTRAMUSCULAR; INTRAVENOUS at 19:49

## 2018-09-16 RX ADMIN — VITAMIN D, TAB 1000IU (100/BT) SCH UNIT: 25 TAB at 09:00

## 2018-09-16 RX ADMIN — CALCIUM SCH MG: 500 TABLET ORAL at 09:00

## 2018-09-16 RX ADMIN — Medication PRN EACH: at 03:55

## 2018-09-16 RX ADMIN — CARVEDILOL SCH MG: 6.25 TABLET, FILM COATED ORAL at 08:00

## 2018-09-16 RX ADMIN — DOCUSATE SODIUM SCH MG: 100 CAPSULE, LIQUID FILLED ORAL at 09:00

## 2018-09-16 RX ADMIN — LOSARTAN POTASSIUM SCH MG: 50 TABLET ORAL at 09:00

## 2018-09-16 RX ADMIN — HEPARIN SODIUM AND DEXTROSE PRN MLS/HR: 5000; 5 INJECTION INTRAVENOUS at 00:03

## 2018-09-16 RX ADMIN — DICLOFENAC SODIUM SCH MG: 25 TABLET, DELAYED RELEASE ORAL at 21:00

## 2018-09-16 RX ADMIN — CITALOPRAM HYDROBROMIDE SCH MG: 10 TABLET ORAL at 09:00

## 2018-09-16 RX ADMIN — MORPHINE SULFATE PRN MG: 2 INJECTION, SOLUTION INTRAMUSCULAR; INTRAVENOUS at 19:50

## 2018-09-16 RX ADMIN — SENNOSIDES AND DOCUSATE SODIUM SCH TAB: 8.6; 5 TABLET ORAL at 21:00

## 2018-09-16 RX ADMIN — NITROGLYCERIN SCH INCH: 20 OINTMENT TOPICAL at 00:00

## 2018-09-16 RX ADMIN — FLUTICASONE PROPIONATE SCH SPRAY: 50 SPRAY, METERED NASAL at 21:00

## 2018-09-16 RX ADMIN — GABAPENTIN SCH MG: 300 CAPSULE ORAL at 14:00

## 2018-09-16 RX ADMIN — MORPHINE SULFATE PRN MG: 2 INJECTION, SOLUTION INTRAMUSCULAR; INTRAVENOUS at 16:01

## 2018-09-16 RX ADMIN — PANTOPRAZOLE SODIUM SCH MG: 40 TABLET, DELAYED RELEASE ORAL at 07:30

## 2018-09-16 RX ADMIN — SENNOSIDES AND DOCUSATE SODIUM SCH TAB: 8.6; 5 TABLET ORAL at 09:00

## 2018-09-16 RX ADMIN — CYANOCOBALAMIN TAB 1000 MCG SCH MCG: 1000 TAB at 09:00

## 2018-09-16 NOTE — RAD
MR#: [id number]

Account#: [id number]

Accession#: [id number]

Date of Study: 09/16/2018

Ordering Physician: KING SOW, 

Referring Physician: MALLY ALMODOVAR Tech: RT Opal BurtonR) (N)





--------------- APPROVED REPORT --------------





Test Type:          Pharmacological

Stress Nurse/Tech: RT Micheal (R) (N)

Test Indications: c/p

Cardiac History: htn, CAD, COPD, smoker, DM, obese

Medications:     See Electronic Medical Record

Medical History: See Electronic Medical Record

Resting Heart Rate: 72 bpm

Resting Blood Pressure: 108/56mmHg

Pretest Chest Pain: No chest pain



Nurse/Tech Notes

S1S2, lungs CTA

Consent: The procedure was explained to the patient in lay terms. Informed consent was witnessed. Alonso
eout was entered into [employer]. History and Stress Test performed by RT Micheal (R) (N)



Pharm. Details

Pharmacologic stress testing was performed using 0.4mg per 5ml of regadenoson given intravenously ove
r 7-10 seconds.



Stress Symptoms

chest pressure about 4 minutes post lexiscan- scale of 8/10. this pressure did go down to 5/10 by end
 of recovery period. h/a and nausea at end of recovery period



POST EXERCISE

Reason for Termination: Infusion complete

Max HR: 95 bpm

Max Blood Pressure: 143/63mmHg

Blood Pressure response to exercise: Normal blood pressure response during stress.

Heart Rate response to exercise: wnl

Chest Pain: Yes. see above note

Arrhythmia: No. 



INTERPRETATION

Stress EKG Conclusion: The resting EKG shows a sinus rhythm with nonspecific ST-T wave changes.

The stress EKG shows no significant changes from baseline.

No EKG evidence of stress-induced ischemia.



Imaging Protocol

IMAGE PROTOCOL: Rest Tc-99m/stress Tc-99m 1 day



Rest:            Stress:         Viability:   

Radiopharm.Tc99m SpxwfvipqVq17p Sestamibi

[id number]            38mCi            

Duration    15min.           15min.           

Img Date  09/16/2018 09/16/2018      

Inj-Img [id number].           45min.           



Rest Admin Site:IV - Right AntecubitalAdministrator:RT Opal BurtonR)(N)

Stress Admin Site: IV - Right AntecubitalAdministrator: Antonino Gavin, RT (R)(N)



STRESS DATA

End Diast. Vol.81.0mlAv. Heart Rate72.0bpm

End Syst. Vol.32.0mlCO Index BSA0.0L/min

Myocardial Sngs793.0gEject. Bmyhnbvp43.0%



Stress Rates

Pk. Fill Rate2.61EDV/secLVtime Pk. Fill 127.36msec

Pk. Empty Rate3.71ESV/secLVtime Pk. Svtaz184.19msec

1/3 Pk. Fill1.69EDV/sec



Stress Scores

Regional WT2.00Summed WT6.00

Regional WM0.00Summed WM23.00



LV Perfusion

The stress scans show no significant defects.

The rest scans show no significant defects.

Nuclear imaging shows no reversible ischemia or infarct.



Wall Motion

Left ventricular systolic function shows an ejection fraction of 60% and slight distal inferior wall 
hypokinesis.



LV Perf. Quant

17 Seg. SSS4.00

17 Seg. SRS0.00

17 Seg. SDS4.00

Stress Defect Extent (% LAD)18.10Rest Defect Extent (% LAD)0.00Rev. Defect Extent (% LAD)18.10

Stress Defect Extent (% LCX) 10.00Rest Defect Extent (% LCX)7.50Rev. Defect Extent (% LCX)0.00

Stress Defect Extent (% RCA)0.00Rest Defect Extent (% RCA)0.00Rev. Defect Extent (% RCA)0.00

Stress Defect Extent (% KARISSA)13.50Rest Defect Extent (% KARISSA)1.30Rev. Defect Extent (% KARISSA)11.70



Conclusion

1. No EKG evidence of stressed induced ischemia.

2. Nuclear imaging shows no reversible ischemia or infarct.

3. Left ventricular ejection fraction is 60% with slight distal inferior wall hypokinesis.

4. Moderately low risk Lexiscan nuclear stress test.



Signed by : King Sow MD

Electronically Approved : 09/16/2018 13:12:01

## 2018-09-16 NOTE — PDOC
PULMONARY PROGRESS NOTES


Subjective


sob better, has occ cough, had cp last night, is tired, is on 02 at night


Vitals





Vital Signs








  Date Time  Temp Pulse Resp B/P (MAP) Pulse Ox O2 Delivery O2 Flow Rate FiO2


 


9/16/18 06:05  99  138/68    


 


9/16/18 03:38 97.7  20  93 Nasal Cannula 3.0 





 97.7       








ROS:  No Nausea, No Chest Pain


General:  Alert, Oriented X4


HEENT:  Other (nc at perrl nose throat clear)


Lungs:  Crackles


Cardiovascular:  S1, S2


Abdomen:  Soft, Non-tender


Neuro Exam:  Alert


Extremities:  No Edema


Skin:  Warm


Labs





Laboratory Tests








Test


 9/14/18


08:25 9/14/18


10:35 9/14/18


11:12 9/14/18


16:55


 


Glucose (Fingerstick)


 117 mg/dL


(70-99) 


 152 mg/dL


(70-99) 126 mg/dL


(70-99)


 


Troponin I Quantitative


 


 0.027 ng/mL


(0.000-0.055) 


 





 


Test


 9/14/18


17:45 9/14/18


20:43 9/15/18


05:00 9/15/18


07:22


 


Sodium Level


 142 mmol/L


(136-145) 


 143 mmol/L


(136-145) 





 


Potassium Level


 4.8 mmol/L


(3.5-5.1) 


 5.2 mmol/L


(3.5-5.1) 





 


Chloride Level


 106 mmol/L


() 


 107 mmol/L


() 





 


Carbon Dioxide Level


 32 mmol/L


(21-32) 


 33 mmol/L


(21-32) 





 


Anion Gap 4 (6-14)   3 (6-14)  


 


Blood Urea Nitrogen


 21 mg/dL


(7-20) 


 19 mg/dL


(7-20) 





 


Creatinine


 1.0 mg/dL


(0.6-1.0) 


 0.9 mg/dL


(0.6-1.0) 





 


Estimated GFR


(Cockcroft-Gault) 54.7 


 


 61.7 


 





 


BUN/Creatinine Ratio 21 (6-20)    


 


Glucose Level


 141 mg/dL


(70-99) 


 123 mg/dL


(70-99) 





 


Calcium Level


 8.5 mg/dL


(8.5-10.1) 


 8.5 mg/dL


(8.5-10.1) 





 


Total Bilirubin


 0.2 mg/dL


(0.2-1.0) 


 


 





 


Aspartate Amino Transf


(AST/SGOT) 17 U/L (15-37) 


 


 


 





 


Alanine Aminotransferase


(ALT/SGPT) 25 U/L (14-59) 


 


 


 





 


Alkaline Phosphatase


 77 U/L


() 


 


 





 


Total Protein


 6.0 g/dL


(6.4-8.2) 


 


 





 


Albumin


 2.6 g/dL


(3.4-5.0) 


 


 





 


Albumin/Globulin Ratio 0.8 (1.0-1.7)    


 


Glucose (Fingerstick)


 


 136 mg/dL


(70-99) 


 117 mg/dL


(70-99)


 


White Blood Count


 


 


 8.8 x10^3/uL


(4.0-11.0) 





 


Red Blood Count


 


 


 3.55 x10^6/uL


(3.50-5.40) 





 


Hemoglobin


 


 


 10.8 g/dL


(12.0-15.5) 





 


Hematocrit


 


 


 32.5 %


(36.0-47.0) 





 


Mean Corpuscular Volume   92 fL ()  


 


Mean Corpuscular Hemoglobin   30 pg (25-35)  


 


Mean Corpuscular Hemoglobin


Concent 


 


 33 g/dL


(31-37) 





 


Red Cell Distribution Width


 


 


 13.5 %


(11.5-14.5) 





 


Platelet Count


 


 


 182 x10^3/uL


(140-400) 





 


Neutrophils (%) (Auto)   53 % (31-73)  


 


Lymphocytes (%) (Auto)   33 % (24-48)  


 


Monocytes (%) (Auto)   10 % (0-9)  


 


Eosinophils (%) (Auto)   3 % (0-3)  


 


Basophils (%) (Auto)   1 % (0-3)  


 


Neutrophils # (Auto)


 


 


 4.6 x10^3uL


(1.8-7.7) 





 


Lymphocytes # (Auto)


 


 


 2.9 x10^3/uL


(1.0-4.8) 





 


Monocytes # (Auto)


 


 


 0.9 x10^3/uL


(0.0-1.1) 





 


Eosinophils # (Auto)


 


 


 0.3 x10^3/uL


(0.0-0.7) 





 


Basophils # (Auto)


 


 


 0.1 x10^3/uL


(0.0-0.2) 





 


Test


 9/15/18


19:59 


 


 





 


Glucose (Fingerstick)


 128 mg/dL


(70-99) 


 


 











Laboratory Tests








Test


 9/15/18


07:22 9/15/18


19:59


 


Glucose (Fingerstick)


 117 mg/dL


(70-99) 128 mg/dL


(70-99)








Medications





Active Scripts








 Medications  Dose


 Route/Sig


 Max Daily Dose Days Date Category


 


 Diclofenac Sodium


 75 Mg Tablet.dr  75 Mg


 PO BID


   9/13/18 Reported


 


 Citalopram Hbr


  (Citalopram


 Hydrobromide) 10


 Mg Tablet  10 Mg


 PO DAILY


   9/13/18 Reported


 


 Losartan


 Potassium 100 Mg


 Tablet  100 Mg


 PO DAILY


   9/13/18 Reported


 


 Ipratropium


 Bromide 0.2 Mg/1


 Ml Solution  0.2 Mg


 IH Q8HRS


   9/13/18 Reported


 


 Meclizine Hcl 25


 Mg Tablet  25 Mg


 PO QID PRN


   9/13/18 Reported


 


 Spiriva


  (Tiotropium


 Bromide) 18 Mcg


 Cap.w.dev  1 Cap


 IH DAILY


   9/13/18 Reported


 


 Calcium (Calcium


 Carbonate) 600 Mg


 Tablet  600 Mg


 PO DAILY


   9/13/18 Reported


 


 Carvedilol 6.25


 Mg Tablet  1 Tab


 PO BID


   1/25/18 Reported


 


 Vitamin D3


  (Cholecalciferol


  (Vitamin D3))


 1,000 Unit Tablet  2,000 Unit


 PO DAILY


   1/25/18 Reported


 


 Vitamin B-12


  (Cyanocobalamin


  (Vitamin B-12))


 1,000 Mcg Tablet  5,000 Mcg


 PO DAILY


   1/25/18 Reported


 


 Omeprazole 40 Mg


 Capsule.dr  40 Mg


 PO DAILY


   1/25/18 Reported


 


 Atorvastatin


 Calcium 80 Mg


 Tablet  80 Mg


 PO HS


   1/25/18 Reported


 


 Fluticasone


 Propionate Nasal


 Spray


  (Fluticasone


 Propionate) 16 Gm


 Spray.susp  1 Spray


 NS BID


   1/25/18 Reported


 


 Trazodone Hcl 100


 Mg Tablet  1 Tab


 PO QHS


   3/16/16 Reported


 


 Gabapentin 600 Mg


 Tablet  600 Mg


 PO TID


   3/16/16 Reported


 


 Aspir 81


  (Aspirin) 81 Mg


 Tablet.dr  1 Tab


 PO DAILY


   3/16/16 Reported








Comments


1. No evidence of pulmonary embolism.


 


2. Small nodules identified in the right upper lobe, right middle lobe of 


lung with the largest measuring 4 mm. Follow-up per [employer] 


guidelines follow-up CT in 6 months.


 


3. Mild left lung base airspace opacity likely atelectasis or infiltrate.


 


4. Moderate emphysematous changes identified in the lungs.


 


5. A 1.8 cm nodule identified in the left adrenal gland likely lipid rich 


adrenal adenoma.


 


6. 1 cm hypodense nodule identified in the right lobe of the thyroid 


gland. Recommend follow-up ultrasound thyroid.





Impression


.


IMPRESSION:


1.  Abnormal VQ scan read out as high probability, cta no pe, has cp, s/p 

cardiac cath 9/15


2.  Chronic obstructive pulmonary disease.


3.  Chronic respiratory failure.


4.  Protein malnutrition, present upon admission.


5.  Atypical chest pain, suspect secondary to reflux.


6.  Coronary artery disease with previous myocardial infarction.


7.  Gastroesophageal reflux.





Plan


.


PLAN:


1.  CT angiogram, venous Dopplers of the lower


extremities were negative:  lovenox  for dvt prophylaxis


2.  Follow up with her pulmonologist in [place].


3.  Follow up with her primary care doctor, Dr. Leach.


4. BD


5. cardiac cath, Normal LV systolic function


Moderate LAD disease.


Ostial borderline lesion in large RCA including a LCX vessel.


cardiology  to review and consider possible Impella assisted revascularization.





discussed w pt and rn











LAURIE REYNA MD Sep 16, 2018 06:42

## 2018-09-16 NOTE — PDOC
PROGRESS NOTES


Chief Complaint


Chief Complaint


CC:


Chest pain


MI, 2 stents 


CAD


HTN


GERD


Osteoarthritis


Chronic renal insufficiency 


Appendectomy


Cholecystectomy


Tonsillectomy


Hysterectomy


<1 ppd smoker





History of Present Illness


History of Present Illness


Pt. seen and examined


Dw RN


Family present at bedside


Pt sleepy


Viability study results pending


Reviewed chart with family





Vitals


Vitals





Vital Signs








  Date Time  Temp Pulse Resp B/P (MAP) Pulse Ox O2 Delivery O2 Flow Rate FiO2


 


9/16/18 12:11      Nasal Cannula 2.0 


 


9/16/18 11:59 98.4 67 16 125/65 (85) 93   





 98.4       











Physical Exam


General:  No acute distress


Heart:  Regular rate


Lungs:  Crackles


Abdomen:  Normal bowel sounds


Extremities:  No edema, Normal pulses


Skin:  No rashes





Labs


LABS





Laboratory Tests








Test


 9/15/18


19:59 9/16/18


07:15


 


Glucose (Fingerstick)


 128 mg/dL


(70-99) 





 


White Blood Count


 


 8.7 x10^3/uL


(4.0-11.0)


 


Red Blood Count


 


 3.53 x10^6/uL


(3.50-5.40)


 


Hemoglobin


 


 10.8 g/dL


(12.0-15.5)


 


Hematocrit


 


 32.3 %


(36.0-47.0)


 


Mean Corpuscular Volume  92 fL () 


 


Mean Corpuscular Hemoglobin  31 pg (25-35) 


 


Mean Corpuscular Hemoglobin


Concent 


 33 g/dL


(31-37)


 


Red Cell Distribution Width


 


 12.9 %


(11.5-14.5)


 


Platelet Count


 


 168 x10^3/uL


(140-400)


 


Neutrophils (%) (Auto)  70 % (31-73) 


 


Lymphocytes (%) (Auto)  19 % (24-48) 


 


Monocytes (%) (Auto)  10 % (0-9) 


 


Eosinophils (%) (Auto)  1 % (0-3) 


 


Basophils (%) (Auto)  1 % (0-3) 


 


Neutrophils # (Auto)


 


 6.0 x10^3uL


(1.8-7.7)


 


Lymphocytes # (Auto)


 


 1.6 x10^3/uL


(1.0-4.8)


 


Monocytes # (Auto)


 


 0.8 x10^3/uL


(0.0-1.1)


 


Eosinophils # (Auto)


 


 0.1 x10^3/uL


(0.0-0.7)


 


Basophils # (Auto)


 


 0.1 x10^3/uL


(0.0-0.2)


 


Heparin Anti-Xa Act,


Unfractionated 


 0.47 IU/mL


(0.30-0.70)


 


Sodium Level


 


 138 mmol/L


(136-145)


 


Potassium Level


 


 4.7 mmol/L


(3.5-5.1)


 


Chloride Level


 


 103 mmol/L


()


 


Carbon Dioxide Level


 


 35 mmol/L


(21-32)


 


Anion Gap  0 (6-14) 


 


Blood Urea Nitrogen


 


 14 mg/dL


(7-20)


 


Creatinine


 


 0.8 mg/dL


(0.6-1.0)


 


Estimated GFR


(Cockcroft-Gault) 


 70.7 





 


Glucose Level


 


 131 mg/dL


(70-99)


 


Calcium Level


 


 8.5 mg/dL


(8.5-10.1)


 


Magnesium Level


 


 1.4 mg/dL


(1.8-2.4)











Review of Systems


Review of Systems


co weakness


co sleepiness





Assessment and Plan


Assessmemt and Plan


Chest pain


MI, 2 stents 


CAD


HTN


GERD


Osteoarthritis


Chronic renal insufficiency 


Appendectomy


Cholecystectomy


Tonsillectomy


Hysterectomy


<1 ppd smoker





Plan


Await viability study


Re cath in am if viability study shows viable myocardium


Labs


Cardiac monitor


Home meds


PTOT


DW trace at length


Total time 31 minutes





Comment


Review of Relevant


I have reviewed the following items irma (where applicable) has been applied.


Labs





Laboratory Tests








Test


 9/14/18


16:55 9/14/18


17:45 9/14/18


20:43 9/15/18


05:00


 


Glucose (Fingerstick)


 126 mg/dL


(70-99) 


 136 mg/dL


(70-99) 





 


Sodium Level


 


 142 mmol/L


(136-145) 


 143 mmol/L


(136-145)


 


Potassium Level


 


 4.8 mmol/L


(3.5-5.1) 


 5.2 mmol/L


(3.5-5.1)


 


Chloride Level


 


 106 mmol/L


() 


 107 mmol/L


()


 


Carbon Dioxide Level


 


 32 mmol/L


(21-32) 


 33 mmol/L


(21-32)


 


Anion Gap  4 (6-14)   3 (6-14) 


 


Blood Urea Nitrogen


 


 21 mg/dL


(7-20) 


 19 mg/dL


(7-20)


 


Creatinine


 


 1.0 mg/dL


(0.6-1.0) 


 0.9 mg/dL


(0.6-1.0)


 


Estimated GFR


(Cockcroft-Gault) 


 54.7 


 


 61.7 





 


BUN/Creatinine Ratio  21 (6-20)   


 


Glucose Level


 


 141 mg/dL


(70-99) 


 123 mg/dL


(70-99)


 


Calcium Level


 


 8.5 mg/dL


(8.5-10.1) 


 8.5 mg/dL


(8.5-10.1)


 


Total Bilirubin


 


 0.2 mg/dL


(0.2-1.0) 


 





 


Aspartate Amino Transf


(AST/SGOT) 


 17 U/L (15-37) 


 


 





 


Alanine Aminotransferase


(ALT/SGPT) 


 25 U/L (14-59) 


 


 





 


Alkaline Phosphatase


 


 77 U/L


() 


 





 


Total Protein


 


 6.0 g/dL


(6.4-8.2) 


 





 


Albumin


 


 2.6 g/dL


(3.4-5.0) 


 





 


Albumin/Globulin Ratio  0.8 (1.0-1.7)   


 


White Blood Count


 


 


 


 8.8 x10^3/uL


(4.0-11.0)


 


Red Blood Count


 


 


 


 3.55 x10^6/uL


(3.50-5.40)


 


Hemoglobin


 


 


 


 10.8 g/dL


(12.0-15.5)


 


Hematocrit


 


 


 


 32.5 %


(36.0-47.0)


 


Mean Corpuscular Volume    92 fL () 


 


Mean Corpuscular Hemoglobin    30 pg (25-35) 


 


Mean Corpuscular Hemoglobin


Concent 


 


 


 33 g/dL


(31-37)


 


Red Cell Distribution Width


 


 


 


 13.5 %


(11.5-14.5)


 


Platelet Count


 


 


 


 182 x10^3/uL


(140-400)


 


Neutrophils (%) (Auto)    53 % (31-73) 


 


Lymphocytes (%) (Auto)    33 % (24-48) 


 


Monocytes (%) (Auto)    10 % (0-9) 


 


Eosinophils (%) (Auto)    3 % (0-3) 


 


Basophils (%) (Auto)    1 % (0-3) 


 


Neutrophils # (Auto)


 


 


 


 4.6 x10^3uL


(1.8-7.7)


 


Lymphocytes # (Auto)


 


 


 


 2.9 x10^3/uL


(1.0-4.8)


 


Monocytes # (Auto)


 


 


 


 0.9 x10^3/uL


(0.0-1.1)


 


Eosinophils # (Auto)


 


 


 


 0.3 x10^3/uL


(0.0-0.7)


 


Basophils # (Auto)


 


 


 


 0.1 x10^3/uL


(0.0-0.2)


 


Test


 9/15/18


07:22 9/15/18


19:59 9/16/18


07:15 





 


Glucose (Fingerstick)


 117 mg/dL


(70-99) 128 mg/dL


(70-99) 


 





 


White Blood Count


 


 


 8.7 x10^3/uL


(4.0-11.0) 





 


Red Blood Count


 


 


 3.53 x10^6/uL


(3.50-5.40) 





 


Hemoglobin


 


 


 10.8 g/dL


(12.0-15.5) 





 


Hematocrit


 


 


 32.3 %


(36.0-47.0) 





 


Mean Corpuscular Volume   92 fL ()  


 


Mean Corpuscular Hemoglobin   31 pg (25-35)  


 


Mean Corpuscular Hemoglobin


Concent 


 


 33 g/dL


(31-37) 





 


Red Cell Distribution Width


 


 


 12.9 %


(11.5-14.5) 





 


Platelet Count


 


 


 168 x10^3/uL


(140-400) 





 


Neutrophils (%) (Auto)   70 % (31-73)  


 


Lymphocytes (%) (Auto)   19 % (24-48)  


 


Monocytes (%) (Auto)   10 % (0-9)  


 


Eosinophils (%) (Auto)   1 % (0-3)  


 


Basophils (%) (Auto)   1 % (0-3)  


 


Neutrophils # (Auto)


 


 


 6.0 x10^3uL


(1.8-7.7) 





 


Lymphocytes # (Auto)


 


 


 1.6 x10^3/uL


(1.0-4.8) 





 


Monocytes # (Auto)


 


 


 0.8 x10^3/uL


(0.0-1.1) 





 


Eosinophils # (Auto)


 


 


 0.1 x10^3/uL


(0.0-0.7) 





 


Basophils # (Auto)


 


 


 0.1 x10^3/uL


(0.0-0.2) 





 


Heparin Anti-Xa Act,


Unfractionated 


 


 0.47 IU/mL


(0.30-0.70) 





 


Sodium Level


 


 


 138 mmol/L


(136-145) 





 


Potassium Level


 


 


 4.7 mmol/L


(3.5-5.1) 





 


Chloride Level


 


 


 103 mmol/L


() 





 


Carbon Dioxide Level


 


 


 35 mmol/L


(21-32) 





 


Anion Gap   0 (6-14)  


 


Blood Urea Nitrogen


 


 


 14 mg/dL


(7-20) 





 


Creatinine


 


 


 0.8 mg/dL


(0.6-1.0) 





 


Estimated GFR


(Cockcroft-Gault) 


 


 70.7 


 





 


Glucose Level


 


 


 131 mg/dL


(70-99) 





 


Calcium Level


 


 


 8.5 mg/dL


(8.5-10.1) 





 


Magnesium Level


 


 


 1.4 mg/dL


(1.8-2.4) 











Laboratory Tests








Test


 9/15/18


19:59 9/16/18


07:15


 


Glucose (Fingerstick)


 128 mg/dL


(70-99) 





 


White Blood Count


 


 8.7 x10^3/uL


(4.0-11.0)


 


Red Blood Count


 


 3.53 x10^6/uL


(3.50-5.40)


 


Hemoglobin


 


 10.8 g/dL


(12.0-15.5)


 


Hematocrit


 


 32.3 %


(36.0-47.0)


 


Mean Corpuscular Volume  92 fL () 


 


Mean Corpuscular Hemoglobin  31 pg (25-35) 


 


Mean Corpuscular Hemoglobin


Concent 


 33 g/dL


(31-37)


 


Red Cell Distribution Width


 


 12.9 %


(11.5-14.5)


 


Platelet Count


 


 168 x10^3/uL


(140-400)


 


Neutrophils (%) (Auto)  70 % (31-73) 


 


Lymphocytes (%) (Auto)  19 % (24-48) 


 


Monocytes (%) (Auto)  10 % (0-9) 


 


Eosinophils (%) (Auto)  1 % (0-3) 


 


Basophils (%) (Auto)  1 % (0-3) 


 


Neutrophils # (Auto)


 


 6.0 x10^3uL


(1.8-7.7)


 


Lymphocytes # (Auto)


 


 1.6 x10^3/uL


(1.0-4.8)


 


Monocytes # (Auto)


 


 0.8 x10^3/uL


(0.0-1.1)


 


Eosinophils # (Auto)


 


 0.1 x10^3/uL


(0.0-0.7)


 


Basophils # (Auto)


 


 0.1 x10^3/uL


(0.0-0.2)


 


Heparin Anti-Xa Act,


Unfractionated 


 0.47 IU/mL


(0.30-0.70)


 


Sodium Level


 


 138 mmol/L


(136-145)


 


Potassium Level


 


 4.7 mmol/L


(3.5-5.1)


 


Chloride Level


 


 103 mmol/L


()


 


Carbon Dioxide Level


 


 35 mmol/L


(21-32)


 


Anion Gap  0 (6-14) 


 


Blood Urea Nitrogen


 


 14 mg/dL


(7-20)


 


Creatinine


 


 0.8 mg/dL


(0.6-1.0)


 


Estimated GFR


(Cockcroft-Gault) 


 70.7 





 


Glucose Level


 


 131 mg/dL


(70-99)


 


Calcium Level


 


 8.5 mg/dL


(8.5-10.1)


 


Magnesium Level


 


 1.4 mg/dL


(1.8-2.4)








Medications





Current Medications


Aspirin (Alexia Aspirin) 325 mg 1X  ONCE PO  Last administered on 9/13/18at 18:16

;  Start 9/13/18 at 17:30;  Stop 9/13/18 at 17:31;  Status DC


Nitroglycerin (Nitrostat) 0.4 mg PRN Q5MIN  PRN SL CHEST PAIN;  Start 9/13/18 

at 17:30;  Stop 9/15/18 at 10:31;  Status DC


Nitroglycerin (Nitro-Bid Oint) 1 inch Q6HRS TP  Last administered on 9/16/18at 

06:05;  Start 9/13/18 at 18:00


Morphine Sulfate (Morphine Sulfate) 1 mg PRN Q10MIN  PRN IV CHEST PAIN Last 

administered on 9/15/18at 22:52;  Start 9/13/18 at 17:30


Acetaminophen (Tylenol) 650 mg PRN Q6HRS  PRN PO MILD PAIN / TEMP;  Start 9/13/ 18 at 17:30


Ondansetron HCl (Zofran) 4 mg PRN Q6HRS  PRN IV NAUSEA/VOMITING Last 

administered on 9/16/18at 11:12;  Start 9/13/18 at 17:30


Prochlorperazine (Compazine) 25 mg PRN Q12HR  PRN NH NAUSEA/VOMITING;  Start 9/ 13/18 at 17:30


Zolpidem Tartrate (Ambien) 5 mg PRN QHS  PRN PO INSOMNIA, 1ST CHOICE Last 

administered on 9/15/18at 20:05;  Start 9/13/18 at 17:30


Enoxaparin Sodium (Lovenox 40mg Syringe) 40 mg Q12HR SQ ;  Start 9/13/18 at 21:

00;  Status Cancel


Sodium Chloride (Normal Saline Flush) 3 ml QSHIFT  PRN IV AFTER MEDS AND BLOOD 

DRAWS;  Start 9/13/18 at 17:30;  Stop 9/15/18 at 10:32;  Status DC


Sodium Chloride 1,000 ml @  100 mls/hr Q10H IV  Last administered on 9/14/18at 

20:39;  Start 9/13/18 at 17:21;  Stop 9/15/18 at 15:28;  Status DC


Senna/Docusate Sodium (Senna Plus) 1 tab BID PO  Last administered on 9/15/18at 

20:05;  Start 9/13/18 at 21:00


Docusate Sodium (Colace) 100 mg BID PO  Last administered on 9/15/18at 20:05;  

Start 9/13/18 at 21:00


Magnesium Hydroxide (Milk Of Magnesia) 2,400 mg PRN Q12HR  PRN PO CONSTIPATION;

  Start 9/13/18 at 17:30


Aspirin (Ecotrin) 81 mg DAILY PO  Last administered on 9/15/18at 12:16;  Start 9 /14/18 at 09:00


Carvedilol (Coreg) 6.25 mg BIDWMEALS PO  Last administered on 9/15/18at 17:50;  

Start 9/13/18 at 18:00


Vitamin D (Vitamin D3) 2,000 unit DAILY PO  Last administered on 9/15/18at 15:19

;  Start 9/14/18 at 09:00


Citalopram Hydrobromide (CeleXA) 10 mg DAILY PO  Last administered on 9/15/18at 

15:19;  Start 9/14/18 at 09:00


Cyanocobalamin (Vitamin B-12) 5,000 mcg DAILY PO  Last administered on 9/15/

18at 15:18;  Start 9/14/18 at 09:00


Fluticasone Propionate (Flonase) 1 spray BID NS  Last administered on 9/15/18at 

20:01;  Start 9/13/18 at 21:00


Ipratropium Bromide (Atrovent) 0.2 mg Q8HRS IH ;  Start 9/13/18 at 22:00;  

Status Cancel


Trazodone HCl (Desyrel) 100 mg QHS PO  Last administered on 9/15/18at 20:05;  

Start 9/13/18 at 21:00


Atorvastatin Calcium (Lipitor) 80 mg QHS PO  Last administered on 9/15/18at 20:

05;  Start 9/13/18 at 21:00


Calcium Carbonate/ Glycine (Oscal) 500 mg DAILY PO  Last administered on 9/14/ 18at 10:18;  Start 9/14/18 at 09:00


Diclofenac Sodium (Voltaren) 75 mg BID PO  Last administered on 9/15/18at 20:04

;  Start 9/13/18 at 21:00


Gabapentin (Neurontin) 600 mg TID PO  Last administered on 9/15/18at 20:05;  

Start 9/13/18 at 21:00


Losartan Potassium (Cozaar) 100 mg DAILY PO  Last administered on 9/15/18at 12:

15;  Start 9/14/18 at 09:00


Meclizine HCl (Antivert) 25 mg PRN TID  PRN PO DIZZINESS;  Start 9/13/18 at 18:

00


Pantoprazole Sodium (Protonix) 40 mg DAILYAC PO  Last administered on 9/15/18at 

12:13;  Start 9/14/18 at 07:30


Albuterol/ Ipratropium (Duoneb) 3 ml RTQID NEB  Last administered on 9/16/18at 

12:09;  Start 9/13/18 at 20:00


Enoxaparin Sodium (Lovenox 80mg Syringe) 80 mg Q12HR SQ  Last administered on 9/ 13/18at 18:18;  Start 9/13/18 at 19:01;  Stop 9/14/18 at 07:18;  Status DC


Enalaprilat (Vasotec Inj) 1.25 mg PRN Q6HRS  PRN IVP HYPERTENSION, SEE COMMENTS 

Last administered on 9/16/18at 02:06;  Start 9/14/18 at 03:15


Enoxaparin Sodium (Lovenox 80mg Syringe) 70 mg Q12HR SQ  Last administered on 9/ 14/18at 10:23;  Start 9/14/18 at 09:00;  Stop 9/14/18 at 18:44;  Status DC


Iohexol (Omnipaque 300 Mg/ml) 60 ml 1X  ONCE IV  Last administered on 9/14/18at 

11:52;  Start 9/14/18 at 11:30;  Stop 9/14/18 at 11:31;  Status DC


Info (CONTRAST GIVEN -- Rx MONITORING) 1 each PRN DAILY  PRN MC SEE COMMENTS;  

Start 9/14/18 at 11:30;  Stop 9/16/18 at 11:29;  Status DC


Guaifenesin (Robitussin Dm) 10 ml PRN Q6HRS  PRN PO COUGH 1ST CHOICE;  Start 9/ 14/18 at 17:30


Acetaminophen/ Hydrocodone Bitart (Lortab 5/325) 1 tab PRN Q4HRS  PRN PO 

MODERATE - SEVERE PAIN Last administered on 9/15/18at 12:14;  Start 9/14/18 at 

17:30


Temazepam (Restoril) 7.5 mg PRN QHS  PRN PO INSOMNIA, 2ND CHOICE;  Start 9/14/ 18 at 17:30


Enoxaparin Sodium (Lovenox 40mg Syringe) 40 mg Q24H SQ  Last administered on 9/

15/18at 20:04;  Start 9/14/18 at 21:00;  Stop 9/15/18 at 23:48;  Status DC


Iodixanol (Visipaque 320) 100 ml STK-MED ONCE .ROUTE ;  Start 9/15/18 at 08:44;

  Stop 9/15/18 at 08:45;  Status DC


Heparin Sodium/ Sodium Chloride 1,000 ml @  As Directed STK-MED ONCE .ROUTE ;  

Start 9/15/18 at 08:45;  Stop 9/15/18 at 08:46;  Status DC


Lidocaine HCl (Xylocaine 1% Pf 30ml Vial) 30 ml STK-MED ONCE .ROUTE ;  Start 9/

15/18 at 08:49;  Stop 9/15/18 at 08:50;  Status DC


Fentanyl Citrate (Fentanyl 2ml Vial) 100 mcg STK-MED ONCE .ROUTE ;  Start 9/15/

18 at 08:52;  Stop 9/15/18 at 08:53;  Status DC


Midazolam HCl (Versed) 5 mg STK-MED ONCE .ROUTE ;  Start 9/15/18 at 08:52;  

Stop 9/15/18 at 08:53;  Status DC


Heparin Sodium/ Sodium Chloride (HEPARIN for ARTERIAL LINE FLUSH) 1,000 unit 1X

  ONCE IART  Last administered on 9/15/18at 10:10;  Start 9/15/18 at 09:45;  

Stop 9/15/18 at 09:47;  Status DC


Heparin Sodium/ Sodium Chloride (HEPARIN for ARTERIAL LINE FLUSH) 1,000 unit 1X

  ONCE IART  Last administered on 9/15/18at 10:10;  Start 9/15/18 at 09:45;  

Stop 9/15/18 at 09:47;  Status DC


Midazolam HCl (Versed) 5 mg 1X  ONCE IV  Last administered on 9/15/18at 10:11;  

Start 9/15/18 at 09:45;  Stop 9/15/18 at 09:47;  Status DC


Fentanyl Citrate (Fentanyl 2ml Vial) 100 mcg 1X  ONCE IV  Last administered on 9

/15/18at 10:11;  Start 9/15/18 at 09:45;  Stop 9/15/18 at 09:47;  Status DC


Iodixanol (Visipaque 320) 100 ml 1X  ONCE IART  Last administered on 9/15/18at 

10:10;  Start 9/15/18 at 09:45;  Stop 9/15/18 at 09:47;  Status DC


Lidocaine HCl (Xylocaine 1% Pf 30ml Vial) 30 ml 1X  ONCE INJ  Last administered 

on 9/15/18at 10:10;  Start 9/15/18 at 09:45;  Stop 9/15/18 at 09:47;  Status DC


Sodium Chloride (Normal Saline Flush) 3 ml QSHIFT  PRN IV AFTER MEDS AND BLOOD 

DRAWS;  Start 9/15/18 at 10:30


Sodium Chloride 1,000 ml @  75 mls/hr [id number] IV ;  Start 9/15/18 at 10:26;  

Stop 9/15/18 at 16:32;  Status DC


Nitroglycerin (Nitrostat) 0.4 mg PRN Q5MIN  PRN SL CHEST PAIN Last administered 

on 9/15/18at 11:56;  Start 9/15/18 at 10:30


Morphine Sulfate (Morphine Sulfate) 4 mg PRN Q2HR  PRN IV SEVERE PAIN;  Start 9/

15/18 at 23:45


Morphine Sulfate (Morphine Sulfate) 2 mg PRN Q2HR  PRN IV MODERATE PAIN;  Start 

9/15/18 at 23:45


Heparin Sodium/ Dextrose 500 ml @ 0 mls/hr CONT  PRN IV SEE I/O RECORD Last 

administered on 9/16/18at 00:03;  Start 9/15/18 at 23:45


Heparin Sodium (Porcine) (Heparin Sodium) 2,150 unit PRN Q6HRS  PRN IV FOR UFH 

LEVEL LESS THAN 0.2 Last administered on 9/15/18at 23:53;  Start 9/15/18 at 23:

45


Alprazolam (Xanax) 0.25 mg PRN Q6HRS  PRN PO ANXIETY / AGITATION Last 

administered on 9/15/18at 23:52;  Start 9/15/18 at 23:45


Info (Anti-Coagulation Monitoring By Pharmacy) 1 each PRN DAILY  PRN MC SEE 

COMMENTS Last administered on 9/16/18at 09:41;  Start 9/15/18 at 23:45


Regadenoson (Lexiscan) 0.4 mg 1X  ONCE IV  Last administered on 9/16/18at 09:00

;  Start 9/16/18 at 09:00;  Stop 9/16/18 at 09:01;  Status DC


Ondansetron HCl (Zofran) 4 mg STK-MED ONCE .ROUTE ;  Start 9/16/18 at 11:08;  

Stop 9/16/18 at 11:09;  Status DC


Magnesium Sulfate/ Dextrose 100 ml @  100 mls/hr 1X  ONCE IV ;  Start 9/16/18 

at 12:45;  Stop 9/16/18 at 13:44;  Status Cancel


Magnesium Sulfate 50 ml @ 25 mls/hr 1X  ONCE IV  Last administered on 9/16/18at 

13:11;  Start 9/16/18 at 13:15;  Stop 9/16/18 at 15:14





Active Scripts


Active


Reported


Diclofenac Sodium 75 Mg Tablet.dr 75 Mg PO BID


Citalopram Hbr (Citalopram Hydrobromide) 10 Mg Tablet 10 Mg PO DAILY


Losartan Potassium 100 Mg Tablet 100 Mg PO DAILY


Ipratropium Bromide 0.2 Mg/1 Ml Solution 0.2 Mg IH Q8HRS


Meclizine Hcl 25 Mg Tablet 25 Mg PO QID PRN


Spiriva (Tiotropium Bromide) 18 Mcg Cap.w.dev 1 Cap IH DAILY


Calcium (Calcium Carbonate) 600 Mg Tablet 600 Mg PO DAILY


Carvedilol 6.25 Mg Tablet 1 Tab PO BID


Vitamin D3 (Cholecalciferol (Vitamin D3)) 1,000 Unit Tablet 2,000 Unit PO DAILY


Vitamin B-12 (Cyanocobalamin (Vitamin B-12)) 1,000 Mcg Tablet 5,000 Mcg PO DAILY


Omeprazole 40 Mg Capsule.dr 40 Mg PO DAILY


Atorvastatin Calcium 80 Mg Tablet 80 Mg PO HS


Fluticasone Propionate Nasal Spray (Fluticasone Propionate) 16 Gm Spray.susp 1 

Spray NS BID


Trazodone Hcl 100 Mg Tablet 1 Tab PO QHS


Gabapentin 600 Mg Tablet 600 Mg PO TID


Aspir 81 (Aspirin) 81 Mg Tablet. 1 Tab PO DAILY


Vitals/I & O





Vital Sign - Last 24 Hours








 9/15/18 9/15/18 9/15/18 9/15/18





 15:00 17:50 19:04 19:25


 


Temp 98.8  98.5 





 98.8  98.5 


 


Pulse 60 60 61 


 


Resp 18  20 


 


B/P (MAP) 145/60 (88) 145/60 127/67 (87) 


 


Pulse Ox 93  96 


 


O2 Delivery Nasal Cannula  Nasal Cannula Nasal Cannula


 


O2 Flow Rate 2.0  2.0 2.0


 


    





    





 9/15/18 9/15/18 9/15/18 9/15/18





 19:42 22:52 22:53 22:58


 


Temp    98.2





    98.2


 


Pulse   84 84


 


Resp  22  22


 


B/P (MAP)   208/82 208/82 (124)


 


Pulse Ox 96 92  92


 


O2 Delivery Nasal Cannula Nasal Cannula  Nasal Cannula


 


O2 Flow Rate 2.0 2.0  2.0


 


    





    





 9/15/18 9/15/18 9/16/18 9/16/18





 23:05 23:22 02:05 02:06


 


Pulse   90 90


 


Resp  20  


 


B/P (MAP) 165/72 (103)  185/67 (106) 185/67


 


Pulse Ox  92  


 


O2 Delivery  Nasal Cannula  


 


O2 Flow Rate  2.0  





 9/16/18 9/16/18 9/16/18 9/16/18





 03:38 06:05 07:00 07:54


 


Temp 97.7  98.6 





 97.7  98.6 


 


Pulse 99 99 81 


 


Resp 20  16 


 


B/P (MAP) 138/68 (91) 138/68 129/62 (84) 


 


Pulse Ox 93  97 96


 


O2 Delivery Nasal Cannula  Nasal Cannula Nasal Cannula


 


O2 Flow Rate 3.0  3.0 2.0


 


    





    





 9/16/18 9/16/18 9/16/18 





 08:00 11:59 12:11 


 


Temp  98.4  





  98.4  


 


Pulse  67  


 


Resp  16  


 


B/P (MAP)  125/65 (85)  


 


Pulse Ox  93  


 


O2 Delivery Room Air Nasal Cannula Nasal Cannula 


 


O2 Flow Rate  3.0 2.0 














Intake and Output   


 


 9/15/18 9/15/18 9/16/18





 15:00 23:00 07:00


 


Intake Total 100 ml 500 ml 500 ml


 


Output Total 300 ml 950 ml 175 ml


 


Balance -200 ml -450 ml 325 ml

















ANGELINA CAMPBELL III DO Sep 16, 2018 13:24

## 2018-09-16 NOTE — PDOC
PROGRESS NOTES


Subjective


Subjective


Patient seen and examined


Comfortable this morning.





Objective


Objective





Vital Signs








  Date Time  Temp Pulse Resp B/P (MAP) Pulse Ox O2 Delivery O2 Flow Rate FiO2


 


9/16/18 08:00      Room Air  


 


9/16/18 07:54     96  2.0 


 


9/16/18 07:00 98.6 81 16 129/62 (84)    





 98.6       














Intake and Output 


 


 9/16/18





 07:00


 


Intake Total 1100 ml


 


Output Total 1425 ml


 


Balance -325 ml


 


 


 


Intake Oral 1100 ml


 


Output Urine Total 1425 ml











Physical Exam


Abdomen:  Normal bowel sounds


Heart:  Regular rate


General:  No acute distress


Lungs:  Clear to auscultation





Assessment


Assessment


1. chest pain. No pulmonary embolus as per the primary service as above. 

Catheterization yesterday showed a borderline off stool right coronary artery 

lesion. Episodes of epigastric and chest discomfort last night now resolved. 

Checking MPI test today to look for ischemia on scanning. Possible future 

revascularization. This was discussed with the patient and her family. 





2.  pulmonary embolus. Evaluated by the pulmonary service.  Continue present 

treatmenmt.





3.  Asthma/COPD





4.  HTN





5.  HLD  continue statin therapy





6. GERD continue home meds





Comment


Review of Relevant


I have reviewed the following items irma (where applicable) has been applied.


Labs





Laboratory Tests








Test


 9/14/18


11:12 9/14/18


16:55 9/14/18


17:45 9/14/18


20:43


 


Glucose (Fingerstick)


 152 mg/dL


(70-99) 126 mg/dL


(70-99) 


 136 mg/dL


(70-99)


 


Sodium Level


 


 


 142 mmol/L


(136-145) 





 


Potassium Level


 


 


 4.8 mmol/L


(3.5-5.1) 





 


Chloride Level


 


 


 106 mmol/L


() 





 


Carbon Dioxide Level


 


 


 32 mmol/L


(21-32) 





 


Anion Gap   4 (6-14)  


 


Blood Urea Nitrogen


 


 


 21 mg/dL


(7-20) 





 


Creatinine


 


 


 1.0 mg/dL


(0.6-1.0) 





 


Estimated GFR


(Cockcroft-Gault) 


 


 54.7 


 





 


BUN/Creatinine Ratio   21 (6-20)  


 


Glucose Level


 


 


 141 mg/dL


(70-99) 





 


Calcium Level


 


 


 8.5 mg/dL


(8.5-10.1) 





 


Total Bilirubin


 


 


 0.2 mg/dL


(0.2-1.0) 





 


Aspartate Amino Transf


(AST/SGOT) 


 


 17 U/L (15-37) 


 





 


Alanine Aminotransferase


(ALT/SGPT) 


 


 25 U/L (14-59) 


 





 


Alkaline Phosphatase


 


 


 77 U/L


() 





 


Total Protein


 


 


 6.0 g/dL


(6.4-8.2) 





 


Albumin


 


 


 2.6 g/dL


(3.4-5.0) 





 


Albumin/Globulin Ratio   0.8 (1.0-1.7)  


 


Test


 9/15/18


05:00 9/15/18


07:22 9/15/18


19:59 9/16/18


07:15


 


White Blood Count


 8.8 x10^3/uL


(4.0-11.0) 


 


 8.7 x10^3/uL


(4.0-11.0)


 


Red Blood Count


 3.55 x10^6/uL


(3.50-5.40) 


 


 3.53 x10^6/uL


(3.50-5.40)


 


Hemoglobin


 10.8 g/dL


(12.0-15.5) 


 


 10.8 g/dL


(12.0-15.5)


 


Hematocrit


 32.5 %


(36.0-47.0) 


 


 32.3 %


(36.0-47.0)


 


Mean Corpuscular Volume 92 fL ()    92 fL () 


 


Mean Corpuscular Hemoglobin 30 pg (25-35)    31 pg (25-35) 


 


Mean Corpuscular Hemoglobin


Concent 33 g/dL


(31-37) 


 


 33 g/dL


(31-37)


 


Red Cell Distribution Width


 13.5 %


(11.5-14.5) 


 


 12.9 %


(11.5-14.5)


 


Platelet Count


 182 x10^3/uL


(140-400) 


 


 168 x10^3/uL


(140-400)


 


Neutrophils (%) (Auto) 53 % (31-73)    70 % (31-73) 


 


Lymphocytes (%) (Auto) 33 % (24-48)    19 % (24-48) 


 


Monocytes (%) (Auto) 10 % (0-9)    10 % (0-9) 


 


Eosinophils (%) (Auto) 3 % (0-3)    1 % (0-3) 


 


Basophils (%) (Auto) 1 % (0-3)    1 % (0-3) 


 


Neutrophils # (Auto)


 4.6 x10^3uL


(1.8-7.7) 


 


 6.0 x10^3uL


(1.8-7.7)


 


Lymphocytes # (Auto)


 2.9 x10^3/uL


(1.0-4.8) 


 


 1.6 x10^3/uL


(1.0-4.8)


 


Monocytes # (Auto)


 0.9 x10^3/uL


(0.0-1.1) 


 


 0.8 x10^3/uL


(0.0-1.1)


 


Eosinophils # (Auto)


 0.3 x10^3/uL


(0.0-0.7) 


 


 0.1 x10^3/uL


(0.0-0.7)


 


Basophils # (Auto)


 0.1 x10^3/uL


(0.0-0.2) 


 


 0.1 x10^3/uL


(0.0-0.2)


 


Sodium Level


 143 mmol/L


(136-145) 


 


 138 mmol/L


(136-145)


 


Potassium Level


 5.2 mmol/L


(3.5-5.1) 


 


 4.7 mmol/L


(3.5-5.1)


 


Chloride Level


 107 mmol/L


() 


 


 103 mmol/L


()


 


Carbon Dioxide Level


 33 mmol/L


(21-32) 


 


 35 mmol/L


(21-32)


 


Anion Gap 3 (6-14)    0 (6-14) 


 


Blood Urea Nitrogen


 19 mg/dL


(7-20) 


 


 14 mg/dL


(7-20)


 


Creatinine


 0.9 mg/dL


(0.6-1.0) 


 


 0.8 mg/dL


(0.6-1.0)


 


Estimated GFR


(Cockcroft-Gault) 61.7 


 


 


 70.7 





 


Glucose Level


 123 mg/dL


(70-99) 


 


 131 mg/dL


(70-99)


 


Calcium Level


 8.5 mg/dL


(8.5-10.1) 


 


 8.5 mg/dL


(8.5-10.1)


 


Glucose (Fingerstick)


 


 117 mg/dL


(70-99) 128 mg/dL


(70-99) 





 


Heparin Anti-Xa Act,


Unfractionated 


 


 


 0.47 IU/mL


(0.30-0.70)


 


Magnesium Level


 


 


 


 1.4 mg/dL


(1.8-2.4)








Laboratory Tests








Test


 9/15/18


19:59 9/16/18


07:15


 


Glucose (Fingerstick)


 128 mg/dL


(70-99) 





 


White Blood Count


 


 8.7 x10^3/uL


(4.0-11.0)


 


Red Blood Count


 


 3.53 x10^6/uL


(3.50-5.40)


 


Hemoglobin


 


 10.8 g/dL


(12.0-15.5)


 


Hematocrit


 


 32.3 %


(36.0-47.0)


 


Mean Corpuscular Volume  92 fL () 


 


Mean Corpuscular Hemoglobin  31 pg (25-35) 


 


Mean Corpuscular Hemoglobin


Concent 


 33 g/dL


(31-37)


 


Red Cell Distribution Width


 


 12.9 %


(11.5-14.5)


 


Platelet Count


 


 168 x10^3/uL


(140-400)


 


Neutrophils (%) (Auto)  70 % (31-73) 


 


Lymphocytes (%) (Auto)  19 % (24-48) 


 


Monocytes (%) (Auto)  10 % (0-9) 


 


Eosinophils (%) (Auto)  1 % (0-3) 


 


Basophils (%) (Auto)  1 % (0-3) 


 


Neutrophils # (Auto)


 


 6.0 x10^3uL


(1.8-7.7)


 


Lymphocytes # (Auto)


 


 1.6 x10^3/uL


(1.0-4.8)


 


Monocytes # (Auto)


 


 0.8 x10^3/uL


(0.0-1.1)


 


Eosinophils # (Auto)


 


 0.1 x10^3/uL


(0.0-0.7)


 


Basophils # (Auto)


 


 0.1 x10^3/uL


(0.0-0.2)


 


Heparin Anti-Xa Act,


Unfractionated 


 0.47 IU/mL


(0.30-0.70)


 


Sodium Level


 


 138 mmol/L


(136-145)


 


Potassium Level


 


 4.7 mmol/L


(3.5-5.1)


 


Chloride Level


 


 103 mmol/L


()


 


Carbon Dioxide Level


 


 35 mmol/L


(21-32)


 


Anion Gap  0 (6-14) 


 


Blood Urea Nitrogen


 


 14 mg/dL


(7-20)


 


Creatinine


 


 0.8 mg/dL


(0.6-1.0)


 


Estimated GFR


(Cockcroft-Gault) 


 70.7 





 


Glucose Level


 


 131 mg/dL


(70-99)


 


Calcium Level


 


 8.5 mg/dL


(8.5-10.1)


 


Magnesium Level


 


 1.4 mg/dL


(1.8-2.4)








Medications





Current Medications


Aspirin (Alexia Aspirin) 325 mg 1X  ONCE PO  Last administered on 9/13/18at 18:16

;  Start 9/13/18 at 17:30;  Stop 9/13/18 at 17:31;  Status DC


Nitroglycerin (Nitrostat) 0.4 mg PRN Q5MIN  PRN SL CHEST PAIN;  Start 9/13/18 

at 17:30;  Stop 9/15/18 at 10:31;  Status DC


Nitroglycerin (Nitro-Bid Oint) 1 inch Q6HRS TP  Last administered on 9/16/18at 

06:05;  Start 9/13/18 at 18:00


Morphine Sulfate (Morphine Sulfate) 1 mg PRN Q10MIN  PRN IV CHEST PAIN Last 

administered on 9/15/18at 22:52;  Start 9/13/18 at 17:30


Acetaminophen (Tylenol) 650 mg PRN Q6HRS  PRN PO MILD PAIN / TEMP;  Start 9/13/ 18 at 17:30


Ondansetron HCl (Zofran) 4 mg PRN Q6HRS  PRN IV NAUSEA/VOMITING Last 

administered on 9/15/18at 22:52;  Start 9/13/18 at 17:30


Prochlorperazine (Compazine) 25 mg PRN Q12HR  PRN [place] NAUSEA/VOMITING;  Start 9/ 13/18 at 17:30


Zolpidem Tartrate (Ambien) 5 mg PRN QHS  PRN PO INSOMNIA, 1ST CHOICE Last 

administered on 9/15/18at 20:05;  Start 9/13/18 at 17:30


Enoxaparin Sodium (Lovenox 40mg Syringe) 40 mg Q12HR SQ ;  Start 9/13/18 at 21:

00;  Status Cancel


Sodium Chloride (Normal Saline Flush) 3 ml QSHIFT  PRN IV AFTER MEDS AND BLOOD 

DRAWS;  Start 9/13/18 at 17:30;  Stop 9/15/18 at 10:32;  Status DC


Sodium Chloride 1,000 ml @  100 mls/hr Q10H IV  Last administered on 9/14/18at 

20:39;  Start 9/13/18 at 17:21;  Stop 9/15/18 at 15:28;  Status DC


Senna/Docusate Sodium (Senna Plus) 1 tab BID PO  Last administered on 9/15/18at 

20:05;  Start 9/13/18 at 21:00


Docusate Sodium (Colace) 100 mg BID PO  Last administered on 9/15/18at 20:05;  

Start 9/13/18 at 21:00


Magnesium Hydroxide (Milk Of Magnesia) 2,400 mg PRN Q12HR  PRN PO CONSTIPATION;

  Start 9/13/18 at 17:30


Aspirin (Ecotrin) 81 mg DAILY PO  Last administered on 9/15/18at 12:16;  Start 9 /14/18 at 09:00


Carvedilol (Coreg) 6.25 mg BIDWMEALS PO  Last administered on 9/15/18at 17:50;  

Start 9/13/18 at 18:00


Vitamin D (Vitamin D3) 2,000 unit DAILY PO  Last administered on 9/15/18at 15:19

;  Start 9/14/18 at 09:00


Citalopram Hydrobromide (CeleXA) 10 mg DAILY PO  Last administered on 9/15/18at 

15:19;  Start 9/14/18 at 09:00


Cyanocobalamin (Vitamin B-12) 5,000 mcg DAILY PO  Last administered on 9/15/

18at 15:18;  Start 9/14/18 at 09:00


Fluticasone Propionate (Flonase) 1 spray BID NS  Last administered on 9/15/18at 

20:01;  Start 9/13/18 at 21:00


Ipratropium Bromide (Atrovent) 0.2 mg Q8HRS IH ;  Start 9/13/18 at 22:00;  

Status Cancel


Trazodone HCl (Desyrel) 100 mg QHS PO  Last administered on 9/15/18at 20:05;  

Start 9/13/18 at 21:00


Atorvastatin Calcium (Lipitor) 80 mg QHS PO  Last administered on 9/15/18at 20:

05;  Start 9/13/18 at 21:00


Calcium Carbonate/ Glycine (Oscal) 500 mg DAILY PO  Last administered on 9/14/ 18at 10:18;  Start 9/14/18 at 09:00


Diclofenac Sodium (Voltaren) 75 mg BID PO  Last administered on 9/15/18at 20:04

;  Start 9/13/18 at 21:00


Gabapentin (Neurontin) 600 mg TID PO  Last administered on 9/15/18at 20:05;  

Start 9/13/18 at 21:00


Losartan Potassium (Cozaar) 100 mg DAILY PO  Last administered on 9/15/18at 12:

15;  Start 9/14/18 at 09:00


Meclizine HCl (Antivert) 25 mg PRN TID  PRN PO DIZZINESS;  Start 9/13/18 at 18:

00


Pantoprazole Sodium (Protonix) 40 mg DAILYAC PO  Last administered on 9/15/18at 

12:13;  Start 9/14/18 at 07:30


Albuterol/ Ipratropium (Duoneb) 3 ml RTQID NEB  Last administered on 9/16/18at 

07:51;  Start 9/13/18 at 20:00


Enoxaparin Sodium (Lovenox 80mg Syringe) 80 mg Q12HR SQ  Last administered on 9/ 13/18at 18:18;  Start 9/13/18 at 19:01;  Stop 9/14/18 at 07:18;  Status DC


Enalaprilat (Vasotec Inj) 1.25 mg PRN Q6HRS  PRN IVP HYPERTENSION, SEE COMMENTS 

Last administered on 9/16/18at 02:06;  Start 9/14/18 at 03:15


Enoxaparin Sodium (Lovenox 80mg Syringe) 70 mg Q12HR SQ  Last administered on 9/ 14/18at 10:23;  Start 9/14/18 at 09:00;  Stop 9/14/18 at 18:44;  Status DC


Iohexol (Omnipaque 300 Mg/ml) 60 ml 1X  ONCE IV  Last administered on 9/14/18at 

11:52;  Start 9/14/18 at 11:30;  Stop 9/14/18 at 11:31;  Status DC


Info (CONTRAST GIVEN -- Rx MONITORING) 1 each PRN DAILY  PRN MC SEE COMMENTS;  

Start 9/14/18 at 11:30;  Stop 9/16/18 at 11:29


Guaifenesin (Robitussin Dm) 10 ml PRN Q6HRS  PRN PO COUGH 1ST CHOICE;  Start 9/ 14/18 at 17:30


Acetaminophen/ Hydrocodone Bitart (Lortab 5/325) 1 tab PRN Q4HRS  PRN PO 

MODERATE - SEVERE PAIN Last administered on 9/15/18at 12:14;  Start 9/14/18 at 

17:30


Temazepam (Restoril) 7.5 mg PRN QHS  PRN PO INSOMNIA, 2ND CHOICE;  Start 9/14/ 18 at 17:30


Enoxaparin Sodium (Lovenox 40mg Syringe) 40 mg Q24H SQ  Last administered on 9/

15/18at 20:04;  Start 9/14/18 at 21:00;  Stop 9/15/18 at 23:48;  Status DC


Iodixanol (Visipaque 320) 100 ml STK-MED ONCE .ROUTE ;  Start 9/15/18 at 08:44;

  Stop 9/15/18 at 08:45;  Status DC


Heparin Sodium/ Sodium Chloride 1,000 ml @  As Directed STK-MED ONCE .ROUTE ;  

Start 9/15/18 at 08:45;  Stop 9/15/18 at 08:46;  Status DC


Lidocaine HCl (Xylocaine 1% Pf 30ml Vial) 30 ml STK-MED ONCE .ROUTE ;  Start 9/

15/18 at 08:49;  Stop 9/15/18 at 08:50;  Status DC


Fentanyl Citrate (Fentanyl 2ml Vial) 100 mcg STK-MED ONCE .ROUTE ;  Start 9/15/

18 at 08:52;  Stop 9/15/18 at 08:53;  Status DC


Midazolam HCl (Versed) 5 mg STK-MED ONCE .ROUTE ;  Start 9/15/18 at 08:52;  

Stop 9/15/18 at 08:53;  Status DC


Heparin Sodium/ Sodium Chloride (HEPARIN for ARTERIAL LINE FLUSH) 1,000 unit 1X

  ONCE IART  Last administered on 9/15/18at 10:10;  Start 9/15/18 at 09:45;  

Stop 9/15/18 at 09:47;  Status DC


Heparin Sodium/ Sodium Chloride (HEPARIN for ARTERIAL LINE FLUSH) 1,000 unit 1X

  ONCE IART  Last administered on 9/15/18at 10:10;  Start 9/15/18 at 09:45;  

Stop 9/15/18 at 09:47;  Status DC


Midazolam HCl (Versed) 5 mg 1X  ONCE IV  Last administered on 9/15/18at 10:11;  

Start 9/15/18 at 09:45;  Stop 9/15/18 at 09:47;  Status DC


Fentanyl Citrate (Fentanyl 2ml Vial) 100 mcg 1X  ONCE IV  Last administered on 9

/15/18at 10:11;  Start 9/15/18 at 09:45;  Stop 9/15/18 at 09:47;  Status DC


Iodixanol (Visipaque 320) 100 ml 1X  ONCE IART  Last administered on 9/15/18at 

10:10;  Start 9/15/18 at 09:45;  Stop 9/15/18 at 09:47;  Status DC


Lidocaine HCl (Xylocaine 1% Pf 30ml Vial) 30 ml 1X  ONCE INJ  Last administered 

on 9/15/18at 10:10;  Start 9/15/18 at 09:45;  Stop 9/15/18 at 09:47;  Status DC


Sodium Chloride (Normal Saline Flush) 3 ml QSHIFT  PRN IV AFTER MEDS AND BLOOD 

DRAWS;  Start 9/15/18 at 10:30


Sodium Chloride 1,000 ml @  75 mls/hr [id number] IV ;  Start 9/15/18 at 10:26;  

Stop 9/15/18 at 16:32;  Status DC


Nitroglycerin (Nitrostat) 0.4 mg PRN Q5MIN  PRN SL CHEST PAIN Last administered 

on 9/15/18at 11:56;  Start 9/15/18 at 10:30


Morphine Sulfate (Morphine Sulfate) 4 mg PRN Q2HR  PRN IV SEVERE PAIN;  Start 9/

15/18 at 23:45


Morphine Sulfate (Morphine Sulfate) 2 mg PRN Q2HR  PRN IV MODERATE PAIN;  Start 

9/15/18 at 23:45


Heparin Sodium/ Dextrose 500 ml @ 0 mls/hr CONT  PRN IV SEE I/O RECORD Last 

administered on 9/16/18at 00:03;  Start 9/15/18 at 23:45


Heparin Sodium (Porcine) (Heparin Sodium) 2,150 unit PRN Q6HRS  PRN IV FOR UFH 

LEVEL LESS THAN 0.2 Last administered on 9/15/18at 23:53;  Start 9/15/18 at 23:

45


Alprazolam (Xanax) 0.25 mg PRN Q6HRS  PRN PO ANXIETY / AGITATION Last 

administered on 9/15/18at 23:52;  Start 9/15/18 at 23:45


Info (Anti-Coagulation Monitoring By Pharmacy) 1 each PRN DAILY  PRN MC SEE 

COMMENTS Last administered on 9/16/18at 09:41;  Start 9/15/18 at 23:45


Regadenoson (Lexiscan) 0.4 mg 1X  ONCE IV  Last administered on 9/16/18at 09:00

;  Start 9/16/18 at 09:00;  Stop 9/16/18 at 09:01;  Status DC





Active Scripts


Active


Reported


Diclofenac Sodium 75 Mg Tablet. 75 Mg PO BID


Citalopram Hbr (Citalopram Hydrobromide) 10 Mg Tablet 10 Mg PO DAILY


Losartan Potassium 100 Mg Tablet 100 Mg PO DAILY


Ipratropium Bromide 0.2 Mg/1 Ml Solution 0.2 Mg IH Q8HRS


Meclizine Hcl 25 Mg Tablet 25 Mg PO QID PRN


Spiriva (Tiotropium Bromide) 18 Mcg Cap.w.dev 1 Cap IH DAILY


Calcium (Calcium Carbonate) 600 Mg Tablet 600 Mg PO DAILY


Carvedilol 6.25 Mg Tablet 1 Tab PO BID


Vitamin D3 (Cholecalciferol (Vitamin D3)) 1,000 Unit Tablet 2,000 Unit PO DAILY


Vitamin B-12 (Cyanocobalamin (Vitamin B-12)) 1,000 Mcg Tablet 5,000 Mcg PO DAILY


Omeprazole 40 Mg Capsule.dr 40 Mg PO DAILY


Atorvastatin Calcium 80 Mg Tablet 80 Mg PO HS


Fluticasone Propionate Nasal Spray (Fluticasone Propionate) 16 Gm Spray.susp 1 

Spray NS BID


Trazodone Hcl 100 Mg Tablet 1 Tab PO QHS


Gabapentin 600 Mg Tablet 600 Mg PO TID


Aspir 81 (Aspirin) 81 Mg Tablet.dr 1 Tab PO DAILY


Vitals/I & O





Vital Sign - Last 24 Hours








 9/15/18 9/15/18 9/15/18 9/15/18





 11:15 11:30 11:45 11:56


 


Pulse 72 72 72 72


 


B/P (MAP) 120/64 (82) 120/64 (82) 122/56 (78) 145/58


 


Pulse Ox 92 92 92 


 


O2 Delivery Nasal Cannula Nasal Cannula Nasal Cannula 


 


O2 Flow Rate 6.0 6.0 6.0 





 9/15/18 9/15/18 9/15/18 9/15/18





 12:00 12:13 12:14 12:15


 


Pulse 72 72  72


 


Resp   18 


 


B/P (MAP) 129/53 (78) 145/58  145/58


 


Pulse Ox 92  88 


 


O2 Delivery Nasal Cannula  Room Air 


 


O2 Flow Rate 6.0  6.0 





 9/15/18 9/15/18 9/15/18 9/15/18





 12:30 13:00 13:14 15:00


 


Temp    98.8





    98.8


 


Pulse 72 62  60


 


Resp   20 18


 


B/P (MAP) 121/52 (75) 115/57 (76)  145/60 (88)


 


Pulse Ox 92 92 91 93


 


O2 Delivery Nasal Cannula Nasal Cannula Room Air Nasal Cannula


 


O2 Flow Rate 6.0 6.0 6.0 2.0


 


    





    





 9/15/18 9/15/18 9/15/18 9/15/18





 17:50 19:04 19:25 19:42


 


Temp  98.5  





  98.5  


 


Pulse 60 61  


 


Resp  20  


 


B/P (MAP) 145/60 127/67 (87)  


 


Pulse Ox  96  96


 


O2 Delivery  Nasal Cannula Nasal Cannula Nasal Cannula


 


O2 Flow Rate  2.0 2.0 2.0


 


    





    





 9/15/18 9/15/18 9/15/18 9/15/18





 22:52 22:53 22:58 23:05


 


Temp   98.2 





   98.2 


 


Pulse  84 84 


 


Resp 22  22 


 


B/P (MAP)  208/82 208/82 (124) 165/72 (103)


 


Pulse Ox 92  92 


 


O2 Delivery Nasal Cannula  Nasal Cannula 


 


O2 Flow Rate 2.0  2.0 


 


    





    





 9/15/18 9/16/18 9/16/18 9/16/18





 23:22 02:05 02:06 03:38


 


Temp    97.7





    97.7


 


Pulse  90 90 99


 


Resp 20   20


 


B/P (MAP)  185/67 (106) 185/67 138/68 (91)


 


Pulse Ox 92   93


 


O2 Delivery Nasal Cannula   Nasal Cannula


 


O2 Flow Rate 2.0   3.0


 


    





    





 9/16/18 9/16/18 9/16/18 9/16/18





 06:05 07:00 07:54 08:00


 


Temp  98.6  





  98.6  


 


Pulse 99 81  


 


Resp  16  


 


B/P (MAP) 138/68 129/62 (84)  


 


Pulse Ox  97 96 


 


O2 Delivery  Nasal Cannula Nasal Cannula Room Air


 


O2 Flow Rate  3.0 2.0 














Intake and Output   


 


 9/15/18 9/15/18 9/16/18





 15:00 23:00 07:00


 


Intake Total 100 ml 500 ml 500 ml


 


Output Total 300 ml 950 ml 175 ml


 


Balance -200 ml -450 ml 325 ml

















KING MOCTEZUMA MD Sep 16, 2018 11:13

## 2018-09-17 VITALS — SYSTOLIC BLOOD PRESSURE: 172 MMHG | DIASTOLIC BLOOD PRESSURE: 83 MMHG

## 2018-09-17 VITALS — SYSTOLIC BLOOD PRESSURE: 201 MMHG | DIASTOLIC BLOOD PRESSURE: 92 MMHG

## 2018-09-17 VITALS — SYSTOLIC BLOOD PRESSURE: 150 MMHG | DIASTOLIC BLOOD PRESSURE: 71 MMHG

## 2018-09-17 VITALS — SYSTOLIC BLOOD PRESSURE: 174 MMHG | DIASTOLIC BLOOD PRESSURE: 82 MMHG

## 2018-09-17 VITALS — DIASTOLIC BLOOD PRESSURE: 74 MMHG | SYSTOLIC BLOOD PRESSURE: 171 MMHG

## 2018-09-17 VITALS — SYSTOLIC BLOOD PRESSURE: 163 MMHG | DIASTOLIC BLOOD PRESSURE: 64 MMHG

## 2018-09-17 VITALS — DIASTOLIC BLOOD PRESSURE: 92 MMHG | SYSTOLIC BLOOD PRESSURE: 201 MMHG

## 2018-09-17 VITALS — SYSTOLIC BLOOD PRESSURE: 164 MMHG | DIASTOLIC BLOOD PRESSURE: 81 MMHG

## 2018-09-17 VITALS — DIASTOLIC BLOOD PRESSURE: 83 MMHG | SYSTOLIC BLOOD PRESSURE: 172 MMHG

## 2018-09-17 VITALS — SYSTOLIC BLOOD PRESSURE: 166 MMHG | DIASTOLIC BLOOD PRESSURE: 117 MMHG

## 2018-09-17 VITALS — SYSTOLIC BLOOD PRESSURE: 162 MMHG | DIASTOLIC BLOOD PRESSURE: 75 MMHG

## 2018-09-17 VITALS — DIASTOLIC BLOOD PRESSURE: 75 MMHG | SYSTOLIC BLOOD PRESSURE: 174 MMHG

## 2018-09-17 VITALS — DIASTOLIC BLOOD PRESSURE: 74 MMHG | SYSTOLIC BLOOD PRESSURE: 159 MMHG

## 2018-09-17 VITALS — SYSTOLIC BLOOD PRESSURE: 150 MMHG | DIASTOLIC BLOOD PRESSURE: 78 MMHG

## 2018-09-17 LAB
ANION GAP SERPL CALC-SCNC: 3 MMOL/L (ref 6–14)
BASOPHILS # BLD AUTO: 0.1 X10^3/UL (ref 0–0.2)
BASOPHILS NFR BLD: 1 % (ref 0–3)
BUN SERPL-MCNC: 16 MG/DL (ref 7–20)
CALCIUM SERPL-MCNC: 9 MG/DL (ref 8.5–10.1)
CHLORIDE SERPL-SCNC: 100 MMOL/L (ref 98–107)
CO2 SERPL-SCNC: 36 MMOL/L (ref 21–32)
CREAT SERPL-MCNC: 0.7 MG/DL (ref 0.6–1)
EOSINOPHIL NFR BLD: 0.3 X10^3/UL (ref 0–0.7)
EOSINOPHIL NFR BLD: 3 % (ref 0–3)
ERYTHROCYTE [DISTWIDTH] IN BLOOD BY AUTOMATED COUNT: 12.9 % (ref 11.5–14.5)
GFR SERPLBLD BASED ON 1.73 SQ M-ARVRAT: 82.5 ML/MIN
GLUCOSE SERPL-MCNC: 120 MG/DL (ref 70–99)
HCT VFR BLD CALC: 30.7 % (ref 36–47)
HGB BLD-MCNC: 10.5 G/DL (ref 12–15.5)
LYMPHOCYTES # BLD: 2.8 X10^3/UL (ref 1–4.8)
LYMPHOCYTES NFR BLD AUTO: 31 % (ref 24–48)
MCH RBC QN AUTO: 31 PG (ref 25–35)
MCHC RBC AUTO-ENTMCNC: 34 G/DL (ref 31–37)
MCV RBC AUTO: 91 FL (ref 79–100)
MONO #: 1 X10^3/UL (ref 0–1.1)
MONOCYTES NFR BLD: 12 % (ref 0–9)
NEUT #: 4.8 X10^3UL (ref 1.8–7.7)
NEUTROPHILS NFR BLD AUTO: 54 % (ref 31–73)
PLATELET # BLD AUTO: 163 X10^3/UL (ref 140–400)
POTASSIUM SERPL-SCNC: 4.1 MMOL/L (ref 3.5–5.1)
RBC # BLD AUTO: 3.39 X10^6/UL (ref 3.5–5.4)
SODIUM SERPL-SCNC: 139 MMOL/L (ref 136–145)
WBC # BLD AUTO: 8.9 X10^3/UL (ref 4–11)

## 2018-09-17 PROCEDURE — 027034Z DILATION OF CORONARY ARTERY, ONE ARTERY WITH DRUG-ELUTING INTRALUMINAL DEVICE, PERCUTANEOUS APPROACH: ICD-10-PCS | Performed by: INTERNAL MEDICINE

## 2018-09-17 RX ADMIN — DOCUSATE SODIUM SCH MG: 100 CAPSULE, LIQUID FILLED ORAL at 09:00

## 2018-09-17 RX ADMIN — CARVEDILOL SCH MG: 6.25 TABLET, FILM COATED ORAL at 10:33

## 2018-09-17 RX ADMIN — GABAPENTIN SCH MG: 300 CAPSULE ORAL at 20:55

## 2018-09-17 RX ADMIN — IPRATROPIUM BROMIDE AND ALBUTEROL SULFATE SCH ML: .5; 3 SOLUTION RESPIRATORY (INHALATION) at 16:32

## 2018-09-17 RX ADMIN — NITROGLYCERIN SCH INCH: 20 OINTMENT TOPICAL at 18:17

## 2018-09-17 RX ADMIN — LOSARTAN POTASSIUM SCH MG: 50 TABLET ORAL at 10:34

## 2018-09-17 RX ADMIN — PANTOPRAZOLE SODIUM SCH MG: 40 TABLET, DELAYED RELEASE ORAL at 10:30

## 2018-09-17 RX ADMIN — DICLOFENAC SODIUM SCH MG: 25 TABLET, DELAYED RELEASE ORAL at 09:00

## 2018-09-17 RX ADMIN — HEPARIN SODIUM AND DEXTROSE PRN MLS/HR: 5000; 5 INJECTION INTRAVENOUS at 00:18

## 2018-09-17 RX ADMIN — DOCUSATE SODIUM SCH MG: 100 CAPSULE, LIQUID FILLED ORAL at 20:54

## 2018-09-17 RX ADMIN — SENNOSIDES AND DOCUSATE SODIUM SCH TAB: 8.6; 5 TABLET ORAL at 20:54

## 2018-09-17 RX ADMIN — DICLOFENAC SODIUM SCH MG: 25 TABLET, DELAYED RELEASE ORAL at 21:39

## 2018-09-17 RX ADMIN — FLUTICASONE PROPIONATE SCH SPRAY: 50 SPRAY, METERED NASAL at 20:54

## 2018-09-17 RX ADMIN — IPRATROPIUM BROMIDE AND ALBUTEROL SULFATE SCH ML: .5; 3 SOLUTION RESPIRATORY (INHALATION) at 20:27

## 2018-09-17 RX ADMIN — CALCIUM SCH MG: 500 TABLET ORAL at 09:00

## 2018-09-17 RX ADMIN — IPRATROPIUM BROMIDE AND ALBUTEROL SULFATE SCH ML: .5; 3 SOLUTION RESPIRATORY (INHALATION) at 11:26

## 2018-09-17 RX ADMIN — FLUTICASONE PROPIONATE SCH SPRAY: 50 SPRAY, METERED NASAL at 09:00

## 2018-09-17 RX ADMIN — CARVEDILOL SCH MG: 6.25 TABLET, FILM COATED ORAL at 18:16

## 2018-09-17 RX ADMIN — TRAZODONE HYDROCHLORIDE SCH MG: 100 TABLET ORAL at 20:54

## 2018-09-17 RX ADMIN — ACETAMINOPHEN PRN MG: 500 TABLET ORAL at 18:18

## 2018-09-17 RX ADMIN — IPRATROPIUM BROMIDE AND ALBUTEROL SULFATE SCH ML: .5; 3 SOLUTION RESPIRATORY (INHALATION) at 07:49

## 2018-09-17 RX ADMIN — ACETAMINOPHEN PRN MG: 500 TABLET ORAL at 10:29

## 2018-09-17 RX ADMIN — SENNOSIDES AND DOCUSATE SODIUM SCH TAB: 8.6; 5 TABLET ORAL at 09:00

## 2018-09-17 RX ADMIN — CYANOCOBALAMIN TAB 1000 MCG SCH MCG: 1000 TAB at 09:00

## 2018-09-17 RX ADMIN — GABAPENTIN SCH MG: 300 CAPSULE ORAL at 14:00

## 2018-09-17 RX ADMIN — ONDANSETRON PRN MG: 2 INJECTION INTRAMUSCULAR; INTRAVENOUS at 08:01

## 2018-09-17 RX ADMIN — ENALAPRILAT PRN MG: 1.25 INJECTION, SOLUTION INTRAVENOUS at 08:10

## 2018-09-17 RX ADMIN — NITROGLYCERIN SCH INCH: 20 OINTMENT TOPICAL at 12:00

## 2018-09-17 RX ADMIN — NITROGLYCERIN SCH INCH: 20 OINTMENT TOPICAL at 06:00

## 2018-09-17 RX ADMIN — VITAMIN D, TAB 1000IU (100/BT) SCH UNIT: 25 TAB at 09:00

## 2018-09-17 RX ADMIN — ATORVASTATIN CALCIUM SCH MG: 40 TABLET, FILM COATED ORAL at 20:55

## 2018-09-17 RX ADMIN — GABAPENTIN SCH MG: 300 CAPSULE ORAL at 09:00

## 2018-09-17 RX ADMIN — CITALOPRAM HYDROBROMIDE SCH MG: 10 TABLET ORAL at 09:00

## 2018-09-17 RX ADMIN — NITROGLYCERIN SCH INCH: 20 OINTMENT TOPICAL at 00:00

## 2018-09-17 RX ADMIN — NITROGLYCERIN SCH INCH: 20 OINTMENT TOPICAL at 23:02

## 2018-09-17 RX ADMIN — ASPIRIN SCH MG: 81 TABLET, COATED ORAL at 10:30

## 2018-09-17 NOTE — PDOC
PROGRESS NOTES


Chief Complaint


Chief Complaint


CC:


Chest pain


MI, 2 stents 


CAD


HTN


GERD


Osteoarthritis


Chronic renal insufficiency 


Appendectomy


Cholecystectomy


Tonsillectomy


Hysterectomy


<1 ppd smoker





History of Present Illness


History of Present Illness


Pt. seen and examined


Dw RN


Family present at bedside


Pt sleepy


cardiac cath for today


Reviewed chart with family





Vitals


Vitals





Vital Signs








  Date Time  Temp Pulse Resp B/P (MAP) Pulse Ox O2 Delivery O2 Flow Rate FiO2


 


9/17/18 11:01 98.5 70 16 171/74 (106) 95 Nasal Cannula 2.0 





 98.5       











Physical Exam


General:  Alert, No acute distress


Heart:  Regular rate, No murmurs


Lungs:  Crackles


Abdomen:  Normal bowel sounds


Extremities:  No edema, Normal pulses


Skin:  No rashes





Labs


LABS





Laboratory Tests








Test


 9/16/18


13:05 9/16/18


17:18 9/16/18


21:03 9/17/18


04:55


 


Heparin Anti-Xa Act,


Unfractionated 0.43 IU/mL


(0.30-0.70) 


 


 0.42 IU/mL


(0.30-0.70)


 


Glucose (Fingerstick)


 


 139 mg/dL


(70-99) 153 mg/dL


(70-99) 





 


White Blood Count


 


 


 


 8.9 x10^3/uL


(4.0-11.0)


 


Red Blood Count


 


 


 


 3.39 x10^6/uL


(3.50-5.40)


 


Hemoglobin


 


 


 


 10.5 g/dL


(12.0-15.5)


 


Hematocrit


 


 


 


 30.7 %


(36.0-47.0)


 


Mean Corpuscular Volume    91 fL () 


 


Mean Corpuscular Hemoglobin    31 pg (25-35) 


 


Mean Corpuscular Hemoglobin


Concent 


 


 


 34 g/dL


(31-37)


 


Red Cell Distribution Width


 


 


 


 12.9 %


(11.5-14.5)


 


Platelet Count


 


 


 


 163 x10^3/uL


(140-400)


 


Neutrophils (%) (Auto)    54 % (31-73) 


 


Lymphocytes (%) (Auto)    31 % (24-48) 


 


Monocytes (%) (Auto)    12 % (0-9) 


 


Eosinophils (%) (Auto)    3 % (0-3) 


 


Basophils (%) (Auto)    1 % (0-3) 


 


Neutrophils # (Auto)


 


 


 


 4.8 x10^3uL


(1.8-7.7)


 


Lymphocytes # (Auto)


 


 


 


 2.8 x10^3/uL


(1.0-4.8)


 


Monocytes # (Auto)


 


 


 


 1.0 x10^3/uL


(0.0-1.1)


 


Eosinophils # (Auto)


 


 


 


 0.3 x10^3/uL


(0.0-0.7)


 


Basophils # (Auto)


 


 


 


 0.1 x10^3/uL


(0.0-0.2)


 


Sodium Level


 


 


 


 139 mmol/L


(136-145)


 


Potassium Level


 


 


 


 4.1 mmol/L


(3.5-5.1)


 


Chloride Level


 


 


 


 100 mmol/L


()


 


Carbon Dioxide Level


 


 


 


 36 mmol/L


(21-32)


 


Anion Gap    3 (6-14) 


 


Blood Urea Nitrogen


 


 


 


 16 mg/dL


(7-20)


 


Creatinine


 


 


 


 0.7 mg/dL


(0.6-1.0)


 


Estimated GFR


(Cockcroft-Gault) 


 


 


 82.5 





 


Glucose Level


 


 


 


 120 mg/dL


(70-99)


 


Calcium Level


 


 


 


 9.0 mg/dL


(8.5-10.1)











Review of Systems


Review of Systems


pt complains of occasional c/p


denies fever





Assessment and Plan


Assessmemt and Plan


Assessment:


Chest pain


MI, 2 stents 


CAD


HTN


GERD


Osteoarthritis


Chronic renal insufficiency 


Appendectomy


Cholecystectomy


Tonsillectomy


Hysterectomy


<1 ppd smoker





Plan:


cardiac cath today


cardiac monitor


IV heparin


home meds


wound care





Comment


Review of Relevant


I have reviewed the following items irma (where applicable) has been applied.


Labs





Laboratory Tests








Test


 9/15/18


19:59 9/16/18


07:15 9/16/18


13:05 9/16/18


17:18


 


Glucose (Fingerstick)


 128 mg/dL


(70-99) 


 


 139 mg/dL


(70-99)


 


White Blood Count


 


 8.7 x10^3/uL


(4.0-11.0) 


 





 


Red Blood Count


 


 3.53 x10^6/uL


(3.50-5.40) 


 





 


Hemoglobin


 


 10.8 g/dL


(12.0-15.5) 


 





 


Hematocrit


 


 32.3 %


(36.0-47.0) 


 





 


Mean Corpuscular Volume  92 fL ()   


 


Mean Corpuscular Hemoglobin  31 pg (25-35)   


 


Mean Corpuscular Hemoglobin


Concent 


 33 g/dL


(31-37) 


 





 


Red Cell Distribution Width


 


 12.9 %


(11.5-14.5) 


 





 


Platelet Count


 


 168 x10^3/uL


(140-400) 


 





 


Neutrophils (%) (Auto)  70 % (31-73)   


 


Lymphocytes (%) (Auto)  19 % (24-48)   


 


Monocytes (%) (Auto)  10 % (0-9)   


 


Eosinophils (%) (Auto)  1 % (0-3)   


 


Basophils (%) (Auto)  1 % (0-3)   


 


Neutrophils # (Auto)


 


 6.0 x10^3uL


(1.8-7.7) 


 





 


Lymphocytes # (Auto)


 


 1.6 x10^3/uL


(1.0-4.8) 


 





 


Monocytes # (Auto)


 


 0.8 x10^3/uL


(0.0-1.1) 


 





 


Eosinophils # (Auto)


 


 0.1 x10^3/uL


(0.0-0.7) 


 





 


Basophils # (Auto)


 


 0.1 x10^3/uL


(0.0-0.2) 


 





 


Heparin Anti-Xa Act,


Unfractionated 


 0.47 IU/mL


(0.30-0.70) 0.43 IU/mL


(0.30-0.70) 





 


Sodium Level


 


 138 mmol/L


(136-145) 


 





 


Potassium Level


 


 4.7 mmol/L


(3.5-5.1) 


 





 


Chloride Level


 


 103 mmol/L


() 


 





 


Carbon Dioxide Level


 


 35 mmol/L


(21-32) 


 





 


Anion Gap  0 (6-14)   


 


Blood Urea Nitrogen


 


 14 mg/dL


(7-20) 


 





 


Creatinine


 


 0.8 mg/dL


(0.6-1.0) 


 





 


Estimated GFR


(Cockcroft-Gault) 


 70.7 


 


 





 


Glucose Level


 


 131 mg/dL


(70-99) 


 





 


Calcium Level


 


 8.5 mg/dL


(8.5-10.1) 


 





 


Magnesium Level


 


 1.4 mg/dL


(1.8-2.4) 


 





 


Test


 9/16/18


21:03 9/17/18


04:55 


 





 


Glucose (Fingerstick)


 153 mg/dL


(70-99) 


 


 





 


White Blood Count


 


 8.9 x10^3/uL


(4.0-11.0) 


 





 


Red Blood Count


 


 3.39 x10^6/uL


(3.50-5.40) 


 





 


Hemoglobin


 


 10.5 g/dL


(12.0-15.5) 


 





 


Hematocrit


 


 30.7 %


(36.0-47.0) 


 





 


Mean Corpuscular Volume  91 fL ()   


 


Mean Corpuscular Hemoglobin  31 pg (25-35)   


 


Mean Corpuscular Hemoglobin


Concent 


 34 g/dL


(31-37) 


 





 


Red Cell Distribution Width


 


 12.9 %


(11.5-14.5) 


 





 


Platelet Count


 


 163 x10^3/uL


(140-400) 


 





 


Neutrophils (%) (Auto)  54 % (31-73)   


 


Lymphocytes (%) (Auto)  31 % (24-48)   


 


Monocytes (%) (Auto)  12 % (0-9)   


 


Eosinophils (%) (Auto)  3 % (0-3)   


 


Basophils (%) (Auto)  1 % (0-3)   


 


Neutrophils # (Auto)


 


 4.8 x10^3uL


(1.8-7.7) 


 





 


Lymphocytes # (Auto)


 


 2.8 x10^3/uL


(1.0-4.8) 


 





 


Monocytes # (Auto)


 


 1.0 x10^3/uL


(0.0-1.1) 


 





 


Eosinophils # (Auto)


 


 0.3 x10^3/uL


(0.0-0.7) 


 





 


Basophils # (Auto)


 


 0.1 x10^3/uL


(0.0-0.2) 


 





 


Heparin Anti-Xa Act,


Unfractionated 


 0.42 IU/mL


(0.30-0.70) 


 





 


Sodium Level


 


 139 mmol/L


(136-145) 


 





 


Potassium Level


 


 4.1 mmol/L


(3.5-5.1) 


 





 


Chloride Level


 


 100 mmol/L


() 


 





 


Carbon Dioxide Level


 


 36 mmol/L


(21-32) 


 





 


Anion Gap  3 (6-14)   


 


Blood Urea Nitrogen


 


 16 mg/dL


(7-20) 


 





 


Creatinine


 


 0.7 mg/dL


(0.6-1.0) 


 





 


Estimated GFR


(Cockcroft-Gault) 


 82.5 


 


 





 


Glucose Level


 


 120 mg/dL


(70-99) 


 





 


Calcium Level


 


 9.0 mg/dL


(8.5-10.1) 


 











Laboratory Tests








Test


 9/16/18


13:05 9/16/18


17:18 9/16/18


21:03 9/17/18


04:55


 


Heparin Anti-Xa Act,


Unfractionated 0.43 IU/mL


(0.30-0.70) 


 


 0.42 IU/mL


(0.30-0.70)


 


Glucose (Fingerstick)


 


 139 mg/dL


(70-99) 153 mg/dL


(70-99) 





 


White Blood Count


 


 


 


 8.9 x10^3/uL


(4.0-11.0)


 


Red Blood Count


 


 


 


 3.39 x10^6/uL


(3.50-5.40)


 


Hemoglobin


 


 


 


 10.5 g/dL


(12.0-15.5)


 


Hematocrit


 


 


 


 30.7 %


(36.0-47.0)


 


Mean Corpuscular Volume    91 fL () 


 


Mean Corpuscular Hemoglobin    31 pg (25-35) 


 


Mean Corpuscular Hemoglobin


Concent 


 


 


 34 g/dL


(31-37)


 


Red Cell Distribution Width


 


 


 


 12.9 %


(11.5-14.5)


 


Platelet Count


 


 


 


 163 x10^3/uL


(140-400)


 


Neutrophils (%) (Auto)    54 % (31-73) 


 


Lymphocytes (%) (Auto)    31 % (24-48) 


 


Monocytes (%) (Auto)    12 % (0-9) 


 


Eosinophils (%) (Auto)    3 % (0-3) 


 


Basophils (%) (Auto)    1 % (0-3) 


 


Neutrophils # (Auto)


 


 


 


 4.8 x10^3uL


(1.8-7.7)


 


Lymphocytes # (Auto)


 


 


 


 2.8 x10^3/uL


(1.0-4.8)


 


Monocytes # (Auto)


 


 


 


 1.0 x10^3/uL


(0.0-1.1)


 


Eosinophils # (Auto)


 


 


 


 0.3 x10^3/uL


(0.0-0.7)


 


Basophils # (Auto)


 


 


 


 0.1 x10^3/uL


(0.0-0.2)


 


Sodium Level


 


 


 


 139 mmol/L


(136-145)


 


Potassium Level


 


 


 


 4.1 mmol/L


(3.5-5.1)


 


Chloride Level


 


 


 


 100 mmol/L


()


 


Carbon Dioxide Level


 


 


 


 36 mmol/L


(21-32)


 


Anion Gap    3 (6-14) 


 


Blood Urea Nitrogen


 


 


 


 16 mg/dL


(7-20)


 


Creatinine


 


 


 


 0.7 mg/dL


(0.6-1.0)


 


Estimated GFR


(Cockcroft-Gault) 


 


 


 82.5 





 


Glucose Level


 


 


 


 120 mg/dL


(70-99)


 


Calcium Level


 


 


 


 9.0 mg/dL


(8.5-10.1)








Medications





Current Medications


Aspirin (Alexia Aspirin) 325 mg 1X  ONCE PO  Last administered on 9/13/18at 18:16

;  Start 9/13/18 at 17:30;  Stop 9/13/18 at 17:31;  Status DC


Nitroglycerin (Nitrostat) 0.4 mg PRN Q5MIN  PRN SL CHEST PAIN;  Start 9/13/18 

at 17:30;  Stop 9/15/18 at 10:31;  Status DC


Nitroglycerin (Nitro-Bid Oint) 1 inch Q6HRS TP  Last administered on 9/16/18at 

17:32;  Start 9/13/18 at 18:00


Morphine Sulfate (Morphine Sulfate) 1 mg PRN Q10MIN  PRN IV CHEST PAIN Last 

administered on 9/15/18at 22:52;  Start 9/13/18 at 17:30


Acetaminophen (Tylenol) 650 mg PRN Q6HRS  PRN PO MILD PAIN / TEMP;  Start 9/13/ 18 at 17:30;  Stop 9/17/18 at 10:04;  Status DC


Ondansetron HCl (Zofran) 4 mg PRN Q6HRS  PRN IV NAUSEA/VOMITING Last 

administered on 9/17/18at 08:01;  Start 9/13/18 at 17:30


Prochlorperazine (Compazine) 25 mg PRN Q12HR  PRN OR NAUSEA/VOMITING;  Start 9/ 13/18 at 17:30


Zolpidem Tartrate (Ambien) 5 mg PRN QHS  PRN PO INSOMNIA, 1ST CHOICE Last 

administered on 9/15/18at 20:05;  Start 9/13/18 at 17:30


Enoxaparin Sodium (Lovenox 40mg Syringe) 40 mg Q12HR SQ ;  Start 9/13/18 at 21:

00;  Status Cancel


Sodium Chloride (Normal Saline Flush) 3 ml QSHIFT  PRN IV AFTER MEDS AND BLOOD 

DRAWS;  Start 9/13/18 at 17:30;  Stop 9/15/18 at 10:32;  Status DC


Sodium Chloride 1,000 ml @  100 mls/hr Q10H IV  Last administered on 9/14/18at 

20:39;  Start 9/13/18 at 17:21;  Stop 9/15/18 at 15:28;  Status DC


Senna/Docusate Sodium (Senna Plus) 1 tab BID PO  Last administered on 9/15/18at 

20:05;  Start 9/13/18 at 21:00


Docusate Sodium (Colace) 100 mg BID PO  Last administered on 9/15/18at 20:05;  

Start 9/13/18 at 21:00


Magnesium Hydroxide (Milk Of Magnesia) 2,400 mg PRN Q12HR  PRN PO CONSTIPATION;

  Start 9/13/18 at 17:30


Aspirin (Ecotrin) 81 mg DAILY PO  Last administered on 9/17/18at 10:30;  Start 9 /14/18 at 09:00


Carvedilol (Coreg) 6.25 mg BIDWMEALS PO  Last administered on 9/17/18at 10:33;  

Start 9/13/18 at 18:00


Vitamin D (Vitamin D3) 2,000 unit DAILY PO  Last administered on 9/15/18at 15:19

;  Start 9/14/18 at 09:00


Citalopram Hydrobromide (CeleXA) 10 mg DAILY PO  Last administered on 9/15/18at 

15:19;  Start 9/14/18 at 09:00


Cyanocobalamin (Vitamin B-12) 5,000 mcg DAILY PO  Last administered on 9/15/

18at 15:18;  Start 9/14/18 at 09:00


Fluticasone Propionate (Flonase) 1 spray BID NS  Last administered on 9/15/18at 

20:01;  Start 9/13/18 at 21:00


Ipratropium Bromide (Atrovent) 0.2 mg Q8HRS IH ;  Start 9/13/18 at 22:00;  

Status Cancel


Trazodone HCl (Desyrel) 100 mg QHS PO  Last administered on 9/15/18at 20:05;  

Start 9/13/18 at 21:00


Atorvastatin Calcium (Lipitor) 80 mg QHS PO  Last administered on 9/15/18at 20:

05;  Start 9/13/18 at 21:00


Calcium Carbonate/ Glycine (Oscal) 500 mg DAILY PO  Last administered on 9/14/ 18at 10:18;  Start 9/14/18 at 09:00


Diclofenac Sodium (Voltaren) 75 mg BID PO  Last administered on 9/15/18at 20:04

;  Start 9/13/18 at 21:00


Gabapentin (Neurontin) 600 mg TID PO  Last administered on 9/15/18at 20:05;  

Start 9/13/18 at 21:00


Losartan Potassium (Cozaar) 100 mg DAILY PO  Last administered on 9/17/18at 10:

34;  Start 9/14/18 at 09:00


Meclizine HCl (Antivert) 25 mg PRN TID  PRN PO DIZZINESS;  Start 9/13/18 at 18:

00


Pantoprazole Sodium (Protonix) 40 mg DAILYAC PO  Last administered on 9/17/18at 

10:30;  Start 9/14/18 at 07:30


Albuterol/ Ipratropium (Duoneb) 3 ml RTQID NEB  Last administered on 9/16/18at 

12:09;  Start 9/13/18 at 20:00


Enoxaparin Sodium (Lovenox 80mg Syringe) 80 mg Q12HR SQ  Last administered on 9/ 13/18at 18:18;  Start 9/13/18 at 19:01;  Stop 9/14/18 at 07:18;  Status DC


Enalaprilat (Vasotec Inj) 1.25 mg PRN Q6HRS  PRN IVP HYPERTENSION, SEE COMMENTS 

Last administered on 9/17/18at 08:10;  Start 9/14/18 at 03:15


Enoxaparin Sodium (Lovenox 80mg Syringe) 70 mg Q12HR SQ  Last administered on 9/ 14/18at 10:23;  Start 9/14/18 at 09:00;  Stop 9/14/18 at 18:44;  Status DC


Iohexol (Omnipaque 300 Mg/ml) 60 ml 1X  ONCE IV  Last administered on 9/14/18at 

11:52;  Start 9/14/18 at 11:30;  Stop 9/14/18 at 11:31;  Status DC


Info (CONTRAST GIVEN -- Rx MONITORING) 1 each PRN DAILY  PRN MC SEE COMMENTS;  

Start 9/14/18 at 11:30;  Stop 9/16/18 at 11:29;  Status DC


Guaifenesin (Robitussin Dm) 10 ml PRN Q6HRS  PRN PO COUGH 1ST CHOICE;  Start 9/ 14/18 at 17:30


Acetaminophen/ Hydrocodone Bitart (Lortab 5/325) 1 tab PRN Q4HRS  PRN PO 

MODERATE - SEVERE PAIN Last administered on 9/16/18at 14:46;  Start 9/14/18 at 

17:30


Temazepam (Restoril) 7.5 mg PRN QHS  PRN PO INSOMNIA, 2ND CHOICE;  Start 9/14/ 18 at 17:30


Enoxaparin Sodium (Lovenox 40mg Syringe) 40 mg Q24H SQ  Last administered on 9/

15/18at 20:04;  Start 9/14/18 at 21:00;  Stop 9/15/18 at 23:48;  Status DC


Iodixanol (Visipaque 320) 100 ml STK-MED ONCE .ROUTE ;  Start 9/15/18 at 08:44;

  Stop 9/15/18 at 08:45;  Status DC


Heparin Sodium/ Sodium Chloride 1,000 ml @  As Directed STK-MED ONCE .ROUTE ;  

Start 9/15/18 at 08:45;  Stop 9/15/18 at 08:46;  Status DC


Lidocaine HCl (Xylocaine 1% Pf 30ml Vial) 30 ml STK-MED ONCE .ROUTE ;  Start 9/

15/18 at 08:49;  Stop 9/15/18 at 08:50;  Status DC


Fentanyl Citrate (Fentanyl 2ml Vial) 100 mcg STK-MED ONCE .ROUTE ;  Start 9/15/

18 at 08:52;  Stop 9/15/18 at 08:53;  Status DC


Midazolam HCl (Versed) 5 mg STK-MED ONCE .ROUTE ;  Start 9/15/18 at 08:52;  

Stop 9/15/18 at 08:53;  Status DC


Heparin Sodium/ Sodium Chloride (HEPARIN for ARTERIAL LINE FLUSH) 1,000 unit 1X

  ONCE IART  Last administered on 9/15/18at 10:10;  Start 9/15/18 at 09:45;  

Stop 9/15/18 at 09:47;  Status DC


Heparin Sodium/ Sodium Chloride (HEPARIN for ARTERIAL LINE FLUSH) 1,000 unit 1X

  ONCE IART  Last administered on 9/15/18at 10:10;  Start 9/15/18 at 09:45;  

Stop 9/15/18 at 09:47;  Status DC


Midazolam HCl (Versed) 5 mg 1X  ONCE IV  Last administered on 9/15/18at 10:11;  

Start 9/15/18 at 09:45;  Stop 9/15/18 at 09:47;  Status DC


Fentanyl Citrate (Fentanyl 2ml Vial) 100 mcg 1X  ONCE IV  Last administered on 9

/15/18at 10:11;  Start 9/15/18 at 09:45;  Stop 9/15/18 at 09:47;  Status DC


Iodixanol (Visipaque 320) 100 ml 1X  ONCE IART  Last administered on 9/15/18at 

10:10;  Start 9/15/18 at 09:45;  Stop 9/15/18 at 09:47;  Status DC


Lidocaine HCl (Xylocaine 1% Pf 30ml Vial) 30 ml 1X  ONCE INJ  Last administered 

on 9/15/18at 10:10;  Start 9/15/18 at 09:45;  Stop 9/15/18 at 09:47;  Status DC


Sodium Chloride (Normal Saline Flush) 3 ml QSHIFT  PRN IV AFTER MEDS AND BLOOD 

DRAWS;  Start 9/15/18 at 10:30


Sodium Chloride 1,000 ml @  75 mls/hr [id number] IV ;  Start 9/15/18 at 10:26;  

Stop 9/15/18 at 16:32;  Status DC


Nitroglycerin (Nitrostat) 0.4 mg PRN Q5MIN  PRN SL CHEST PAIN Last administered 

on 9/15/18at 11:56;  Start 9/15/18 at 10:30


Morphine Sulfate (Morphine Sulfate) 4 mg PRN Q2HR  PRN IV SEVERE PAIN;  Start 9/

15/18 at 23:45


Morphine Sulfate (Morphine Sulfate) 2 mg PRN Q2HR  PRN IV MODERATE PAIN Last 

administered on 9/16/18at 19:50;  Start 9/15/18 at 23:45


Heparin Sodium/ Dextrose 500 ml @ 0 mls/hr CONT  PRN IV SEE I/O RECORD Last 

administered on 9/17/18at 00:18;  Start 9/15/18 at 23:45


Heparin Sodium (Porcine) (Heparin Sodium) 2,150 unit PRN Q6HRS  PRN IV FOR UFH 

LEVEL LESS THAN 0.2 Last administered on 9/15/18at 23:53;  Start 9/15/18 at 23:

45


Alprazolam (Xanax) 0.25 mg PRN Q6HRS  PRN PO ANXIETY / AGITATION Last 

administered on 9/17/18at 10:29;  Start 9/15/18 at 23:45


Info (Anti-Coagulation Monitoring By Pharmacy) 1 each PRN DAILY  PRN MC SEE 

COMMENTS Last administered on 9/16/18at 09:41;  Start 9/15/18 at 23:45


Regadenoson (Lexiscan) 0.4 mg 1X  ONCE IV  Last administered on 9/16/18at 09:00

;  Start 9/16/18 at 09:00;  Stop 9/16/18 at 09:01;  Status DC


Ondansetron HCl (Zofran) 4 mg STK-MED ONCE .ROUTE ;  Start 9/16/18 at 11:08;  

Stop 9/16/18 at 11:09;  Status DC


Magnesium Sulfate/ Dextrose 100 ml @  100 mls/hr 1X  ONCE IV ;  Start 9/16/18 

at 12:45;  Stop 9/16/18 at 13:44;  Status Cancel


Magnesium Sulfate 50 ml @ 25 mls/hr 1X  ONCE IV  Last administered on 9/16/18at 

13:11;  Start 9/16/18 at 13:15;  Stop 9/16/18 at 15:14;  Status DC


Ondansetron HCl (Zofran) 4 mg PRN Q6HRS  PRN IV NAUSEA/VOMITING;  Start 9/17/18 

at 08:30


Nitroglycerin/ Dextrose 250 ml @ 0 mls/hr 1X  ONCE IV  Last administered on 9/17 /18at 10:42;  Start 9/17/18 at 08:45;  Stop 9/17/18 at 08:47;  Status DC


Acetaminophen (Tylenol) 1,000 mg PRN Q6HRS  PRN PO MILD PAIN / TEMP Last 

administered on 9/17/18at 10:29;  Start 9/17/18 at 09:00





Active Scripts


Active


Reported


Diclofenac Sodium 75 Mg Tablet.dr 75 Mg PO BID


Citalopram Hbr (Citalopram Hydrobromide) 10 Mg Tablet 10 Mg PO DAILY


Losartan Potassium 100 Mg Tablet 100 Mg PO DAILY


Ipratropium Bromide 0.2 Mg/1 Ml Solution 0.2 Mg IH Q8HRS


Meclizine Hcl 25 Mg Tablet 25 Mg PO QID PRN


Spiriva (Tiotropium Bromide) 18 Mcg Cap.w.dev 1 Cap IH DAILY


Calcium (Calcium Carbonate) 600 Mg Tablet 600 Mg PO DAILY


Carvedilol 6.25 Mg Tablet 1 Tab PO BID


Vitamin D3 (Cholecalciferol (Vitamin D3)) 1,000 Unit Tablet 2,000 Unit PO DAILY


Vitamin B-12 (Cyanocobalamin (Vitamin B-12)) 1,000 Mcg Tablet 5,000 Mcg PO DAILY


Omeprazole 40 Mg Capsule.dr 40 Mg PO DAILY


Atorvastatin Calcium 80 Mg Tablet 80 Mg PO HS


Fluticasone Propionate Nasal Spray (Fluticasone Propionate) 16 Gm Spray.susp 1 

Spray NS BID


Trazodone Hcl 100 Mg Tablet 1 Tab PO QHS


Gabapentin 600 Mg Tablet 600 Mg PO TID


Aspir 81 (Aspirin) 81 Mg Tablet. 1 Tab PO DAILY


Vitals/I & O





Vital Sign - Last 24 Hours








 9/16/18 9/16/18 9/16/18 9/16/18





 11:59 12:11 14:30 14:46


 


Temp 98.4  97.6 





 98.4  97.6 


 


Pulse 67  79 


 


Resp 16  18 


 


B/P (MAP) 125/65 (85)  133/71 (91) 


 


Pulse Ox 93  94 94


 


O2 Delivery Nasal Cannula Nasal Cannula Nasal Cannula Room Air


 


O2 Flow Rate 3.0 2.0 3.0 3.0


 


    





    





 9/16/18 9/16/18 9/16/18 9/16/18





 16:01 16:22 17:32 17:32


 


Pulse   79 79


 


B/P (MAP)   133/71 133/71


 


Pulse Ox 94 94  


 


O2 Delivery Room Air Room Air  


 


O2 Flow Rate 3.0 3.0  





 9/16/18 9/16/18 9/16/18 9/16/18





 19:25 19:50 20:00 20:20


 


Temp 98.6   





 98.6   


 


Pulse 77   


 


Resp 20 18  18


 


B/P (MAP) 165/70 (101)   


 


Pulse Ox 95 94  94


 


O2 Delivery Nasal Cannula Room Air Room Air Nasal Cannula


 


O2 Flow Rate 2.0 3.0  2.0


 


    





    





 9/16/18 9/17/18 9/17/18 9/17/18





 23:35 03:50 07:47 07:52


 


Temp 98.2 97.8 98.4 





 98.2 97.8 98.4 


 


Pulse 74 72 75 


 


Resp 18 18 16 


 


B/P (MAP) 139/65 (89) 150/78 (102) 164/81 (108) 


 


Pulse Ox 98 94 90 95


 


O2 Delivery Nasal Cannula Nasal Cannula Nasal Cannula Nasal Cannula


 


O2 Flow Rate 2.0 2.0 2.0 2.0


 


    





    





 9/17/18 9/17/18 9/17/18 9/17/18





 08:10 10:33 10:34 11:01


 


Temp    98.5





    98.5


 


Pulse 75 75 75 70


 


Resp    16


 


B/P (MAP) 198/89 171/74 171/74 171/74 (106)


 


Pulse Ox    95


 


O2 Delivery    Nasal Cannula


 


O2 Flow Rate    2.0














Intake and Output   


 


 9/16/18 9/16/18 9/17/18





 15:00 23:00 07:00


 


Intake Total   480 ml


 


Balance   480 ml

















ANGELINA CAMPBELL III, DO Sep 17, 2018 11:27

## 2018-09-17 NOTE — CARD
MR#: [id number]

Account#: [id number]

Accession#: [id number]

Date of Study: 09/17/2018

Ordering Physician: ELISSA MEYER, 

Referring Physician: ELISHA SCOTT, 

Tech: RT Xiao (RISHABH) KIRILL





--------------- APPROVED REPORT --------------





Technologist: RT Xiao (R) KIRILL

Nurse: Joycelyn Lowry RN



Procedure(s) performed: Successful PCI/drug eluting stent placement to the right coronary artery 

Moderate sedation: 48 min



INDICATION

The indication(s) include : 73-year-old female with history of coronary artery disease underwent card
iac catheterization 9/15/18 secondary to unstable angina and was found to have significant 80% stenos
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 
left circumflex artery. Patient has been referred to me for PCI/stent placement..



PROCEDURE NARRATIVE

After explaining the risks, benefits and alternative options, informed consent was obtained from elle
ent she was brought to the cardiac Cath Lab and her right wrist was prepped and draped in the usual f
ashion after confirming a positive modified Sha's test. Arterial access was obtained in the right r
adial artery and a 6 [place] sheath was inserted. A 6 [place] JR4 guide catheter with sideholes was adv
anced under fluoroscopy guidance and the right coronary artery was engaged. Selective angiography con
firmed the previously described 80% stenosis involving the ostial segment of a large and dominant rig
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



The stenosis was crossed with a 0.014 inch Covario guidewire, predilated with a 3.0 x 8 mm laury
k balloon following which this was successfully treated with a 3.5 x 8 mm Xience Alpine drug-eluting 
stent. Follow-up angiography showed resolution of the stenosis to 0% with CAYDEN-3 distal flow. Patient
 tolerated the procedure well. Hemostasis was achieved using TR band. There were no immediate complic
ations.



Conclusion

Successful PCI/drug eluting stent placement to the right coronary artery



Recommendations

1.  Aspirin 325 mg daily

2.  Plavix 75 mg daily for preferably one year

3.  Cardiovascular risk factor modification



Signed by : Elissa Pasnoori, 

Electronically Approved : 09/17/2018 17:02:28

## 2018-09-17 NOTE — CARD
MR#: [id number]

Account#: [id number]

Accession#: [id number]

Date of Study: 09/15/2018

Ordering Physician: ANNA MALDONADO, 

Referring Physician: ELISHA SCOTT, 

Tech: Loyd Garcia, RT (R)





--------------- APPROVED REPORT --------------





Procedures

Left heart catheterization

Left ventriculogram

Selective coronary angiogram



The patient is a 73-year-old female with recurrent episodes of chest pain. Patient has had pain over 
the past 5-6 days. Pulmonary workup for a possible pulmonary embolism has been negative. In the setti
ng of recurrent and progressive chest pain a cardiac catheterization was recommended for evaluation o
f possible underlying coronary artery disease. Risks and benefits of the procedure were discussed wit
h the patient. She agreed to proceed.



After informed consent was obtained the patient was brought to the heart catheterization lab. The are
a of the right femoral artery was prepared in the usual manner with Betadine, sterile draping and loc
al anesthetic. An 18-gauge needle was used to enter the right femoral artery, a wire placed the 6 Konrad
nch sheath placed over wire. A 6 Ug[place] JL4 diagnostic catheter was used to engage the left system an
d sequential injections in various views were obtained. A 6 [place] JR4 diagnostic catheter was then a
dvanced the ascending aorta. It was used to engage the right coronary artery. Sequential injections i
n various views were obtained. A pigtail catheter was advanced to ascending aorta and then the left v
entricle. A 30 GUARDADO left ventricular gram was performed. Pullback pressures were measured. The cathet
er was removed from the patient. Injection the sheath showed normal placement. The sheath was removed
 and sealed with an Angio-Seal product.



Hemodynamics.

LV pressure 126/8,18  Aortic root pressure 122/56

Coronaries

Left main. 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

Left anterior descending. The LAD was a moderate size vessel. It had an ostial 10-15% lesion a mid le
christine of 40-45% and a distal lesion of 35%.

Left circumflex. The left circumflex came off the right coronary artery. It had a proximal disease of
 approximately 30%.

Right coronary artery. The right coronary was a large dominant vessel. It had a probable ostial steno
sis estimated at 80%. Proximally it had a 30% lesion. In its midportion there was a previously placed
 patent stent. More distally there was a 35-40% lesion.

Left ventriculogram.

The left ventricle showed normal left ventricular systolic function and no significant mitral regurgi
tation.



<Conclusion>

Ostial right coronary artery disease estimated at 80%.

Patent right coronary artery stent with mild to moderate disease in the right coronary artery.

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

Moderate disease in the LAD with no lesions greater than 50%.

At this time will continue to monitor the patient. We'll evaluate whether the patient will remain a r
equire extra supportive measures to proceed with a intervention of her ostial dominant right coronary
 artery with an anomalous left circumflex coming off the vessel.



Signed by : Donny Sow MD

Electronically Approved : 09/17/2018 17:49:06

## 2018-09-17 NOTE — PDOC
PULMONARY PROGRESS NOTES


Subjective


sob better, has occ cough, had cp last night, is tired, is on 02 at night


Vitals





Vital Signs








  Date Time  Temp Pulse Resp B/P (MAP) Pulse Ox O2 Delivery O2 Flow Rate FiO2


 


9/17/18 11:01 98.5 70 16 171/74 (106) 95 Nasal Cannula 2.0 





 98.5       








ROS:  No Nausea, No Chest Pain


General:  Alert, Oriented X4


HEENT:  Other (nc at perrl nose throat clear)


Lungs:  Clear


Cardiovascular:  S1, S2


Abdomen:  Soft, Non-tender


Neuro Exam:  Alert


Extremities:  Other (trace edema)


Skin:  Warm


Labs





Laboratory Tests








Test


 9/15/18


19:59 9/16/18


07:15 9/16/18


13:05 9/16/18


17:18


 


Glucose (Fingerstick)


 128 mg/dL


(70-99) 


 


 139 mg/dL


(70-99)


 


White Blood Count


 


 8.7 x10^3/uL


(4.0-11.0) 


 





 


Red Blood Count


 


 3.53 x10^6/uL


(3.50-5.40) 


 





 


Hemoglobin


 


 10.8 g/dL


(12.0-15.5) 


 





 


Hematocrit


 


 32.3 %


(36.0-47.0) 


 





 


Mean Corpuscular Volume  92 fL ()   


 


Mean Corpuscular Hemoglobin  31 pg (25-35)   


 


Mean Corpuscular Hemoglobin


Concent 


 33 g/dL


(31-37) 


 





 


Red Cell Distribution Width


 


 12.9 %


(11.5-14.5) 


 





 


Platelet Count


 


 168 x10^3/uL


(140-400) 


 





 


Neutrophils (%) (Auto)  70 % (31-73)   


 


Lymphocytes (%) (Auto)  19 % (24-48)   


 


Monocytes (%) (Auto)  10 % (0-9)   


 


Eosinophils (%) (Auto)  1 % (0-3)   


 


Basophils (%) (Auto)  1 % (0-3)   


 


Neutrophils # (Auto)


 


 6.0 x10^3uL


(1.8-7.7) 


 





 


Lymphocytes # (Auto)


 


 1.6 x10^3/uL


(1.0-4.8) 


 





 


Monocytes # (Auto)


 


 0.8 x10^3/uL


(0.0-1.1) 


 





 


Eosinophils # (Auto)


 


 0.1 x10^3/uL


(0.0-0.7) 


 





 


Basophils # (Auto)


 


 0.1 x10^3/uL


(0.0-0.2) 


 





 


Heparin Anti-Xa Act,


Unfractionated 


 0.47 IU/mL


(0.30-0.70) 0.43 IU/mL


(0.30-0.70) 





 


Sodium Level


 


 138 mmol/L


(136-145) 


 





 


Potassium Level


 


 4.7 mmol/L


(3.5-5.1) 


 





 


Chloride Level


 


 103 mmol/L


() 


 





 


Carbon Dioxide Level


 


 35 mmol/L


(21-32) 


 





 


Anion Gap  0 (6-14)   


 


Blood Urea Nitrogen


 


 14 mg/dL


(7-20) 


 





 


Creatinine


 


 0.8 mg/dL


(0.6-1.0) 


 





 


Estimated GFR


(Cockcroft-Gault) 


 70.7 


 


 





 


Glucose Level


 


 131 mg/dL


(70-99) 


 





 


Calcium Level


 


 8.5 mg/dL


(8.5-10.1) 


 





 


Magnesium Level


 


 1.4 mg/dL


(1.8-2.4) 


 





 


Test


 9/16/18


21:03 9/17/18


04:55 9/17/18


11:50 





 


Glucose (Fingerstick)


 153 mg/dL


(70-99) 


 137 mg/dL


(70-99) 





 


White Blood Count


 


 8.9 x10^3/uL


(4.0-11.0) 


 





 


Red Blood Count


 


 3.39 x10^6/uL


(3.50-5.40) 


 





 


Hemoglobin


 


 10.5 g/dL


(12.0-15.5) 


 





 


Hematocrit


 


 30.7 %


(36.0-47.0) 


 





 


Mean Corpuscular Volume  91 fL ()   


 


Mean Corpuscular Hemoglobin  31 pg (25-35)   


 


Mean Corpuscular Hemoglobin


Concent 


 34 g/dL


(31-37) 


 





 


Red Cell Distribution Width


 


 12.9 %


(11.5-14.5) 


 





 


Platelet Count


 


 163 x10^3/uL


(140-400) 


 





 


Neutrophils (%) (Auto)  54 % (31-73)   


 


Lymphocytes (%) (Auto)  31 % (24-48)   


 


Monocytes (%) (Auto)  12 % (0-9)   


 


Eosinophils (%) (Auto)  3 % (0-3)   


 


Basophils (%) (Auto)  1 % (0-3)   


 


Neutrophils # (Auto)


 


 4.8 x10^3uL


(1.8-7.7) 


 





 


Lymphocytes # (Auto)


 


 2.8 x10^3/uL


(1.0-4.8) 


 





 


Monocytes # (Auto)


 


 1.0 x10^3/uL


(0.0-1.1) 


 





 


Eosinophils # (Auto)


 


 0.3 x10^3/uL


(0.0-0.7) 


 





 


Basophils # (Auto)


 


 0.1 x10^3/uL


(0.0-0.2) 


 





 


Heparin Anti-Xa Act,


Unfractionated 


 0.42 IU/mL


(0.30-0.70) 


 





 


Sodium Level


 


 139 mmol/L


(136-145) 


 





 


Potassium Level


 


 4.1 mmol/L


(3.5-5.1) 


 





 


Chloride Level


 


 100 mmol/L


() 


 





 


Carbon Dioxide Level


 


 36 mmol/L


(21-32) 


 





 


Anion Gap  3 (6-14)   


 


Blood Urea Nitrogen


 


 16 mg/dL


(7-20) 


 





 


Creatinine


 


 0.7 mg/dL


(0.6-1.0) 


 





 


Estimated GFR


(Cockcroft-Gault) 


 82.5 


 


 





 


Glucose Level


 


 120 mg/dL


(70-99) 


 





 


Calcium Level


 


 9.0 mg/dL


(8.5-10.1) 


 











Laboratory Tests








Test


 9/16/18


13:05 9/16/18


17:18 9/16/18


21:03 9/17/18


04:55


 


Heparin Anti-Xa Act,


Unfractionated 0.43 IU/mL


(0.30-0.70) 


 


 0.42 IU/mL


(0.30-0.70)


 


Glucose (Fingerstick)


 


 139 mg/dL


(70-99) 153 mg/dL


(70-99) 





 


White Blood Count


 


 


 


 8.9 x10^3/uL


(4.0-11.0)


 


Red Blood Count


 


 


 


 3.39 x10^6/uL


(3.50-5.40)


 


Hemoglobin


 


 


 


 10.5 g/dL


(12.0-15.5)


 


Hematocrit


 


 


 


 30.7 %


(36.0-47.0)


 


Mean Corpuscular Volume    91 fL () 


 


Mean Corpuscular Hemoglobin    31 pg (25-35) 


 


Mean Corpuscular Hemoglobin


Concent 


 


 


 34 g/dL


(31-37)


 


Red Cell Distribution Width


 


 


 


 12.9 %


(11.5-14.5)


 


Platelet Count


 


 


 


 163 x10^3/uL


(140-400)


 


Neutrophils (%) (Auto)    54 % (31-73) 


 


Lymphocytes (%) (Auto)    31 % (24-48) 


 


Monocytes (%) (Auto)    12 % (0-9) 


 


Eosinophils (%) (Auto)    3 % (0-3) 


 


Basophils (%) (Auto)    1 % (0-3) 


 


Neutrophils # (Auto)


 


 


 


 4.8 x10^3uL


(1.8-7.7)


 


Lymphocytes # (Auto)


 


 


 


 2.8 x10^3/uL


(1.0-4.8)


 


Monocytes # (Auto)


 


 


 


 1.0 x10^3/uL


(0.0-1.1)


 


Eosinophils # (Auto)


 


 


 


 0.3 x10^3/uL


(0.0-0.7)


 


Basophils # (Auto)


 


 


 


 0.1 x10^3/uL


(0.0-0.2)


 


Sodium Level


 


 


 


 139 mmol/L


(136-145)


 


Potassium Level


 


 


 


 4.1 mmol/L


(3.5-5.1)


 


Chloride Level


 


 


 


 100 mmol/L


()


 


Carbon Dioxide Level


 


 


 


 36 mmol/L


(21-32)


 


Anion Gap    3 (6-14) 


 


Blood Urea Nitrogen


 


 


 


 16 mg/dL


(7-20)


 


Creatinine


 


 


 


 0.7 mg/dL


(0.6-1.0)


 


Estimated GFR


(Cockcroft-Gault) 


 


 


 82.5 





 


Glucose Level


 


 


 


 120 mg/dL


(70-99)


 


Calcium Level


 


 


 


 9.0 mg/dL


(8.5-10.1)


 


Test


 9/17/18


11:50 


 


 





 


Glucose (Fingerstick)


 137 mg/dL


(70-99) 


 


 











Medications





Active Scripts








 Medications  Dose


 Route/Sig


 Max Daily Dose Days Date Category


 


 Diclofenac Sodium


 75 Mg Tablet.dr  75 Mg


 PO BID


   9/13/18 Reported


 


 Citalopram Hbr


  (Citalopram


 Hydrobromide) 10


 Mg Tablet  10 Mg


 PO DAILY


   9/13/18 Reported


 


 Losartan


 Potassium 100 Mg


 Tablet  100 Mg


 PO DAILY


   9/13/18 Reported


 


 Ipratropium


 Bromide 0.2 Mg/1


 Ml Solution  0.2 Mg


 IH Q8HRS


   9/13/18 Reported


 


 Meclizine Hcl 25


 Mg Tablet  25 Mg


 PO QID PRN


   9/13/18 Reported


 


 Spiriva


  (Tiotropium


 Bromide) 18 Mcg


 Cap.w.dev  1 Cap


 IH DAILY


   9/13/18 Reported


 


 Calcium (Calcium


 Carbonate) 600 Mg


 Tablet  600 Mg


 PO DAILY


   9/13/18 Reported


 


 Carvedilol 6.25


 Mg Tablet  1 Tab


 PO BID


   1/25/18 Reported


 


 Vitamin D3


  (Cholecalciferol


  (Vitamin D3))


 1,000 Unit Tablet  2,000 Unit


 PO DAILY


   1/25/18 Reported


 


 Vitamin B-12


  (Cyanocobalamin


  (Vitamin B-12))


 1,000 Mcg Tablet  5,000 Mcg


 PO DAILY


   1/25/18 Reported


 


 Omeprazole 40 Mg


 Capsule.dr  40 Mg


 PO DAILY


   1/25/18 Reported


 


 Atorvastatin


 Calcium 80 Mg


 Tablet  80 Mg


 PO HS


   1/25/18 Reported


 


 Fluticasone


 Propionate Nasal


 Spray


  (Fluticasone


 Propionate) 16 Gm


 Spray.susp  1 Spray


 NS BID


   1/25/18 Reported


 


 Trazodone Hcl 100


 Mg Tablet  1 Tab


 PO QHS


   3/16/16 Reported


 


 Gabapentin 600 Mg


 Tablet  600 Mg


 PO TID


   3/16/16 Reported


 


 Aspir 81


  (Aspirin) 81 Mg


 Tablet.dr  1 Tab


 PO DAILY


   3/16/16 Reported








Comments


1. No evidence of pulmonary embolism.


 


2. Small nodules identified in the right upper lobe, right middle lobe of 


lung with the largest measuring 4 mm. Follow-up per Fleischner Society 


guidelines follow-up CT in 6 months.


 


3. Mild left lung base airspace opacity likely atelectasis or infiltrate.


 


4. Moderate emphysematous changes identified in the lungs.


 


5. A 1.8 cm nodule identified in the left adrenal gland likely lipid rich 


adrenal adenoma.


 


6. 1 cm hypodense nodule identified in the right lobe of the thyroid 


gland. Recommend follow-up ultrasound thyroid.





Impression


.





1.  Abnormal VQ scan read out as high probability, CTA  no PE, has cp, s/p 

cardiac cath 9/15


2.  Chronic obstructive pulmonary disease. 40 yrs of tobacco use


3.  Chronic respiratory failure.


4.  Protein malnutrition, present upon admission.


5.  Atypical chest pain, suspect secondary to reflux.


6.  Coronary artery disease with previous myocardial infarction.


7.  Gastroesophageal reflux.





Plan


.


PLAN:


1.  CT angiogram, venous Dopplers of the lower


extremities were negative:  lovenox  for dvt prophylaxis


2.  Follow up with her pulmonologist in [place].


3.  Follow up with her primary care doctor, Dr. Leach.


4. BD


5. cardiac cath, Normal LV systolic function


Moderate LAD disease.


Ostial borderline lesion in large RCA including a LCX vessel.


cardiology  to review and consider possible Impella assisted revascularization.





discussed w pt and rn











ÁNGEL JONES MD Sep 17, 2018 12:37

## 2018-09-17 NOTE — PDOC
MODERATE SEDATION ASSESSMENT


RISKS/ALTERNATIVES


Risks/Alternatives


Risks and alternatives of this type of sedation and procedure discussed with:


RISK/ALTERNATIVES:  Patient





H & P ON CHART


H & P


H & P on chart and reviewed for co-morbid conditions and appropriate labs.


H&P ON CHART:  Yes





PREGNANCY STATUS


PREG STATUS ASSESSED:  N/A





MEDS/ALLERGIES REVIEWED


Meds/Allergies Reviewed


Medications and Allergies including time and route of recently administered 

narcotics and sedatives.


MEDS/ALLERGIES REVIEWED:  Yes





ASA RATING


ASA RATING:  III





AIRWAY ASSESSMENT


Airway Assessment


Airway patency, oral function limitations, presence  of caps, crowns, dentures, 

partials, and ability to extend neck assessed.


AIRWAY ASSESSMENT:  Yes





MALLAMPATI SCORE


MALLAMPATI SCORE:  II





PRE-SEDATION ASSESSMENT


PRE-SEDATION ASSESSMENT:  Yes











ELISSA MEYER MD Sep 17, 2018 16:51

## 2018-09-18 VITALS — SYSTOLIC BLOOD PRESSURE: 156 MMHG | DIASTOLIC BLOOD PRESSURE: 77 MMHG

## 2018-09-18 VITALS — SYSTOLIC BLOOD PRESSURE: 126 MMHG | DIASTOLIC BLOOD PRESSURE: 64 MMHG

## 2018-09-18 VITALS — DIASTOLIC BLOOD PRESSURE: 77 MMHG | SYSTOLIC BLOOD PRESSURE: 156 MMHG

## 2018-09-18 LAB
ANION GAP SERPL CALC-SCNC: 4 MMOL/L (ref 6–14)
BASOPHILS # BLD AUTO: 0.1 X10^3/UL (ref 0–0.2)
BASOPHILS NFR BLD: 1 % (ref 0–3)
BUN SERPL-MCNC: 17 MG/DL (ref 7–20)
CALCIUM SERPL-MCNC: 9 MG/DL (ref 8.5–10.1)
CHLORIDE SERPL-SCNC: 102 MMOL/L (ref 98–107)
CO2 SERPL-SCNC: 36 MMOL/L (ref 21–32)
CREAT SERPL-MCNC: 0.9 MG/DL (ref 0.6–1)
EOSINOPHIL NFR BLD: 0.2 X10^3/UL (ref 0–0.7)
EOSINOPHIL NFR BLD: 3 % (ref 0–3)
ERYTHROCYTE [DISTWIDTH] IN BLOOD BY AUTOMATED COUNT: 12.9 % (ref 11.5–14.5)
GFR SERPLBLD BASED ON 1.73 SQ M-ARVRAT: 61.7 ML/MIN
GLUCOSE SERPL-MCNC: 88 MG/DL (ref 70–99)
HCT VFR BLD CALC: 30.6 % (ref 36–47)
HGB BLD-MCNC: 10.6 G/DL (ref 12–15.5)
LYMPHOCYTES # BLD: 2.9 X10^3/UL (ref 1–4.8)
LYMPHOCYTES NFR BLD AUTO: 35 % (ref 24–48)
MCH RBC QN AUTO: 31 PG (ref 25–35)
MCHC RBC AUTO-ENTMCNC: 35 G/DL (ref 31–37)
MCV RBC AUTO: 91 FL (ref 79–100)
MONO #: 0.9 X10^3/UL (ref 0–1.1)
MONOCYTES NFR BLD: 11 % (ref 0–9)
NEUT #: 4.1 X10^3UL (ref 1.8–7.7)
NEUTROPHILS NFR BLD AUTO: 50 % (ref 31–73)
PLATELET # BLD AUTO: 177 X10^3/UL (ref 140–400)
POTASSIUM SERPL-SCNC: 3.9 MMOL/L (ref 3.5–5.1)
RBC # BLD AUTO: 3.38 X10^6/UL (ref 3.5–5.4)
SODIUM SERPL-SCNC: 142 MMOL/L (ref 136–145)
WBC # BLD AUTO: 8.2 X10^3/UL (ref 4–11)

## 2018-09-18 RX ADMIN — DICLOFENAC SODIUM SCH MG: 25 TABLET, DELAYED RELEASE ORAL at 08:34

## 2018-09-18 RX ADMIN — VITAMIN D, TAB 1000IU (100/BT) SCH UNIT: 25 TAB at 08:34

## 2018-09-18 RX ADMIN — DOCUSATE SODIUM SCH MG: 100 CAPSULE, LIQUID FILLED ORAL at 08:30

## 2018-09-18 RX ADMIN — PANTOPRAZOLE SODIUM SCH MG: 40 TABLET, DELAYED RELEASE ORAL at 08:31

## 2018-09-18 RX ADMIN — SENNOSIDES AND DOCUSATE SODIUM SCH TAB: 8.6; 5 TABLET ORAL at 08:31

## 2018-09-18 RX ADMIN — CYANOCOBALAMIN TAB 1000 MCG SCH MCG: 1000 TAB at 08:34

## 2018-09-18 RX ADMIN — NITROGLYCERIN SCH INCH: 20 OINTMENT TOPICAL at 06:00

## 2018-09-18 RX ADMIN — CARVEDILOL SCH MG: 6.25 TABLET, FILM COATED ORAL at 08:32

## 2018-09-18 RX ADMIN — CITALOPRAM HYDROBROMIDE SCH MG: 10 TABLET ORAL at 08:31

## 2018-09-18 RX ADMIN — IPRATROPIUM BROMIDE AND ALBUTEROL SULFATE SCH ML: .5; 3 SOLUTION RESPIRATORY (INHALATION) at 07:13

## 2018-09-18 RX ADMIN — CALCIUM SCH MG: 500 TABLET ORAL at 08:32

## 2018-09-18 RX ADMIN — IPRATROPIUM BROMIDE AND ALBUTEROL SULFATE SCH ML: .5; 3 SOLUTION RESPIRATORY (INHALATION) at 10:56

## 2018-09-18 RX ADMIN — LOSARTAN POTASSIUM SCH MG: 50 TABLET ORAL at 08:31

## 2018-09-18 RX ADMIN — GABAPENTIN SCH MG: 300 CAPSULE ORAL at 08:30

## 2018-09-18 RX ADMIN — FLUTICASONE PROPIONATE SCH SPRAY: 50 SPRAY, METERED NASAL at 08:36

## 2018-09-18 RX ADMIN — NITROGLYCERIN SCH INCH: 20 OINTMENT TOPICAL at 12:00

## 2018-09-18 NOTE — PDOC
CARDIO Progress Notes


Date and Time


Date of Service


09/18/2018


Time of Evaluation


1001





Subjective


Subjective:  No Chest Pain, No shortness of breath, No Palpitations, No 

Dizziness, Other (no nausea)





Vitals


Vitals





Vital Signs








  Date Time  Temp Pulse Resp B/P (MAP) Pulse Ox O2 Delivery O2 Flow Rate FiO2


 


9/18/18 08:32  85  156/77    


 


9/18/18 07:15     84 Nasal Cannula  


 


9/18/18 07:00 98.1  18    2.0 





 98.1       








Weight


Weight [ ]





Input and Output


Intake and Output











Intake and Output 


 


 9/18/18





 07:00


 


Intake Total 220 ml


 


Output Total 1150 ml


 


Balance -930 ml


 


 


 


Intake Oral 220 ml


 


Output Urine Total 1150 ml


 


# Voids 1











Laboratory


Labs





Laboratory Tests








Test


 9/17/18


11:50 9/17/18


20:33 9/18/18


03:30 9/18/18


07:11


 


Glucose (Fingerstick)


 137 mg/dL


(70-99) 110 mg/dL


(70-99) 


 101 mg/dL


(70-99)


 


White Blood Count


 


 


 8.2 x10^3/uL


(4.0-11.0) 





 


Red Blood Count


 


 


 3.38 x10^6/uL


(3.50-5.40) 





 


Hemoglobin


 


 


 10.6 g/dL


(12.0-15.5) 





 


Hematocrit


 


 


 30.6 %


(36.0-47.0) 





 


Mean Corpuscular Volume   91 fL ()  


 


Mean Corpuscular Hemoglobin   31 pg (25-35)  


 


Mean Corpuscular Hemoglobin


Concent 


 


 35 g/dL


(31-37) 





 


Red Cell Distribution Width


 


 


 12.9 %


(11.5-14.5) 





 


Platelet Count


 


 


 177 x10^3/uL


(140-400) 





 


Neutrophils (%) (Auto)   50 % (31-73)  


 


Lymphocytes (%) (Auto)   35 % (24-48)  


 


Monocytes (%) (Auto)   11 % (0-9)  


 


Eosinophils (%) (Auto)   3 % (0-3)  


 


Basophils (%) (Auto)   1 % (0-3)  


 


Neutrophils # (Auto)


 


 


 4.1 x10^3uL


(1.8-7.7) 





 


Lymphocytes # (Auto)


 


 


 2.9 x10^3/uL


(1.0-4.8) 





 


Monocytes # (Auto)


 


 


 0.9 x10^3/uL


(0.0-1.1) 





 


Eosinophils # (Auto)


 


 


 0.2 x10^3/uL


(0.0-0.7) 





 


Basophils # (Auto)


 


 


 0.1 x10^3/uL


(0.0-0.2) 





 


Sodium Level


 


 


 142 mmol/L


(136-145) 





 


Potassium Level


 


 


 3.9 mmol/L


(3.5-5.1) 





 


Chloride Level


 


 


 102 mmol/L


() 





 


Carbon Dioxide Level


 


 


 36 mmol/L


(21-32) 





 


Anion Gap   4 (6-14)  


 


Blood Urea Nitrogen


 


 


 17 mg/dL


(7-20) 





 


Creatinine


 


 


 0.9 mg/dL


(0.6-1.0) 





 


Estimated GFR


(Cockcroft-Gault) 


 


 61.7 


 





 


Glucose Level


 


 


 88 mg/dL


(70-99) 





 


Calcium Level


 


 


 9.0 mg/dL


(8.5-10.1) 














Physical Exam


HEENT:  Neck Supple W Full Motion


Chest:  Symmetric


LUNGS:  Clear to Auscultation


Heart:  S1S2, RRR, other (tele: SR)


Abdomen:  Soft N/T


Extremities:  No Edema, Other (right wrist site C/D/I; pulse 2+; cap refill 

brisk)


Neurology:  alert, oriented, follow commands





Assessment


Assessment


1.  angina


   --s/p PCI/JASON to RCA


   --continue DAPT X 12 months, preferably


   --continue BB, ARB and high dose statin therapy


   --patient plans to follow up with [facility] - advised to make appt for 2 weeks








2.  HTN


   --control with meds 





3.  HLD


   --continue high dose statin therapy





4.  asthma/copd


   --per JAMMIE Mahan Sep 18, 2018 10:07

## 2018-09-18 NOTE — PDOC3
Discharge Summary [facility]


Date of Admission:  Sep 13, 2018


Discharge Date:  Sep 18, 2018


Admitting Diagnosis





Chest pain, unstable angina s/p PCI with 1 stent at RCA


H/O MI, 2 stents 


HTN


GERD


Osteoarthritis


Chronic renal insufficiency 


Appendectomy


Cholecystectomy


Tonsillectomy


Hysterectomy


<1 ppd smoker


MORbid obesity


CONSULTS


card


Brief Hospital Course


Ms. Brna  is a 73 old  F, with h/o stents, coming for chest pain.


got Cath needed 1 stent at RCA.


pt feels good now, no chest pain


dc home with asa, plavix. cont bb and statin ,and ACEI


dc time 35min. 








 


General:  Alert, No acute distress


Heart:  Regular rate, No murmurs


Lungs:  bl base mild crackles


Abdomen:  Normal bowel sounds


Extremities:  No edema, Normal pulses


Skin:  No rashes


Disposition


home


CONDITION AT DISCHARGE:  Improved


Scheduled


Aspirin (Aspirin Ec), 325 MG PO DAILYWBKFT


Atorvastatin Calcium (Atorvastatin Calcium), 80 MG PO HS, (Reported)


Calcium Carbonate (Calcium), 600 MG PO DAILY, (Reported)


Carvedilol (Carvedilol), 1 TAB PO BID, (Reported)


Cholecalciferol (Vitamin D3) (Vitamin D3), 2,000 UNIT PO DAILY, (Reported)


Citalopram Hydrobromide (Citalopram Hbr), 10 MG PO DAILY, (Reported)


Clopidogrel Bisulfate (Clopidogrel), 75 MG PO DAILYWBKFT


Cyanocobalamin (Vitamin B-12) (Vitamin B-12), 5,000 MCG PO DAILY, (Reported)


Fluticasone Propionate (Fluticasone Propionate Nasal Davey), 1 SPRAY NS BID, (

Reported)


Gabapentin (Gabapentin), 600 MG PO TID, (Reported)


Ipratropium Bromide (Ipratropium Bromide), 0.2 MG IH Q8HRS, (Reported)


Losartan Potassium (Losartan Potassium), 100 MG PO DAILY, (Reported)


Omeprazole (Omeprazole), 40 MG PO DAILY, (Reported)


Tiotropium Bromide (Spiriva), 1 CAP IH DAILY, (Reported)


Trazodone Hcl (Trazodone Hcl), 1 TAB PO QHS, (Reported)





Scheduled PRN


Meclizine Hcl (Meclizine Hcl), 25 MG PO QID PRN for SEE COMMENTS, (Reported)





Discontinued Medications


Aspirin (Aspir 81), 1 TAB PO DAILY, (Reported)


Diclofenac Sodium (Diclofenac Sodium), 75 MG PO BID, (Reported)











ALAINA HUDSON MD Sep 18, 2018 13:09

## 2020-08-13 ENCOUNTER — HOSPITAL ENCOUNTER (INPATIENT)
Dept: HOSPITAL 61 - ER | Age: 75
LOS: 4 days | Discharge: HOME | DRG: 154 | End: 2020-08-17
Attending: INTERNAL MEDICINE | Admitting: INTERNAL MEDICINE
Payer: MEDICARE

## 2020-08-13 VITALS — WEIGHT: 205.25 LBS | HEIGHT: 63 IN | BODY MASS INDEX: 36.37 KG/M2

## 2020-08-13 VITALS — SYSTOLIC BLOOD PRESSURE: 140 MMHG | DIASTOLIC BLOOD PRESSURE: 63 MMHG

## 2020-08-13 VITALS — DIASTOLIC BLOOD PRESSURE: 97 MMHG | SYSTOLIC BLOOD PRESSURE: 146 MMHG

## 2020-08-13 DIAGNOSIS — Z88.8: ICD-10-CM

## 2020-08-13 DIAGNOSIS — M19.90: ICD-10-CM

## 2020-08-13 DIAGNOSIS — E66.01: ICD-10-CM

## 2020-08-13 DIAGNOSIS — I12.9: ICD-10-CM

## 2020-08-13 DIAGNOSIS — Z91.040: ICD-10-CM

## 2020-08-13 DIAGNOSIS — Z90.49: ICD-10-CM

## 2020-08-13 DIAGNOSIS — Z79.899: ICD-10-CM

## 2020-08-13 DIAGNOSIS — R73.9: ICD-10-CM

## 2020-08-13 DIAGNOSIS — Z82.49: ICD-10-CM

## 2020-08-13 DIAGNOSIS — E78.5: ICD-10-CM

## 2020-08-13 DIAGNOSIS — Z95.5: ICD-10-CM

## 2020-08-13 DIAGNOSIS — K11.20: Primary | ICD-10-CM

## 2020-08-13 DIAGNOSIS — E43: ICD-10-CM

## 2020-08-13 DIAGNOSIS — Z90.710: ICD-10-CM

## 2020-08-13 DIAGNOSIS — F17.200: ICD-10-CM

## 2020-08-13 DIAGNOSIS — Z88.0: ICD-10-CM

## 2020-08-13 DIAGNOSIS — I25.10: ICD-10-CM

## 2020-08-13 DIAGNOSIS — K21.9: ICD-10-CM

## 2020-08-13 DIAGNOSIS — Z20.828: ICD-10-CM

## 2020-08-13 DIAGNOSIS — N18.9: ICD-10-CM

## 2020-08-13 DIAGNOSIS — I25.2: ICD-10-CM

## 2020-08-13 DIAGNOSIS — J44.9: ICD-10-CM

## 2020-08-13 LAB
ANION GAP SERPL CALC-SCNC: 7 MMOL/L (ref 6–14)
BASOPHILS # BLD AUTO: 0.1 X10^3/UL (ref 0–0.2)
BASOPHILS NFR BLD: 1 % (ref 0–3)
BUN SERPL-MCNC: 16 MG/DL (ref 7–20)
CALCIUM SERPL-MCNC: 8.7 MG/DL (ref 8.5–10.1)
CHLORIDE SERPL-SCNC: 99 MMOL/L (ref 98–107)
CO2 SERPL-SCNC: 35 MMOL/L (ref 21–32)
CREAT SERPL-MCNC: 1 MG/DL (ref 0.6–1)
EOSINOPHIL NFR BLD: 0.2 X10^3/UL (ref 0–0.7)
EOSINOPHIL NFR BLD: 2 % (ref 0–3)
ERYTHROCYTE [DISTWIDTH] IN BLOOD BY AUTOMATED COUNT: 13 % (ref 11.5–14.5)
GFR SERPLBLD BASED ON 1.73 SQ M-ARVRAT: 54.3 ML/MIN
GLUCOSE SERPL-MCNC: 148 MG/DL (ref 70–99)
HCT VFR BLD CALC: 36.9 % (ref 36–47)
HGB BLD-MCNC: 12.5 G/DL (ref 12–15.5)
LYMPHOCYTES # BLD: 2.4 X10^3/UL (ref 1–4.8)
LYMPHOCYTES NFR BLD AUTO: 26 % (ref 24–48)
MCH RBC QN AUTO: 31 PG (ref 25–35)
MCHC RBC AUTO-ENTMCNC: 34 G/DL (ref 31–37)
MCV RBC AUTO: 92 FL (ref 79–100)
MONO #: 1.1 X10^3/UL (ref 0–1.1)
MONOCYTES NFR BLD: 12 % (ref 0–9)
NEUT #: 5.6 X10^3/UL (ref 1.8–7.7)
NEUTROPHILS NFR BLD AUTO: 60 % (ref 31–73)
PLATELET # BLD AUTO: 203 X10^3/UL (ref 140–400)
POTASSIUM SERPL-SCNC: 3.9 MMOL/L (ref 3.5–5.1)
RBC # BLD AUTO: 4.01 X10^6/UL (ref 3.5–5.4)
SODIUM SERPL-SCNC: 141 MMOL/L (ref 136–145)
WBC # BLD AUTO: 9.4 X10^3/UL (ref 4–11)

## 2020-08-13 PROCEDURE — 94760 N-INVAS EAR/PLS OXIMETRY 1: CPT

## 2020-08-13 PROCEDURE — 80048 BASIC METABOLIC PNL TOTAL CA: CPT

## 2020-08-13 PROCEDURE — 36415 COLL VENOUS BLD VENIPUNCTURE: CPT

## 2020-08-13 PROCEDURE — G0379 DIRECT REFER HOSPITAL OBSERV: HCPCS

## 2020-08-13 PROCEDURE — 96374 THER/PROPH/DIAG INJ IV PUSH: CPT

## 2020-08-13 PROCEDURE — 85007 BL SMEAR W/DIFF WBC COUNT: CPT

## 2020-08-13 PROCEDURE — 85025 COMPLETE CBC W/AUTO DIFF WBC: CPT

## 2020-08-13 PROCEDURE — 80053 COMPREHEN METABOLIC PANEL: CPT

## 2020-08-13 PROCEDURE — G0378 HOSPITAL OBSERVATION PER HR: HCPCS

## 2020-08-13 RX ADMIN — MORPHINE SULFATE PRN MG: 2 INJECTION, SOLUTION INTRAMUSCULAR; INTRAVENOUS at 19:31

## 2020-08-13 RX ADMIN — MORPHINE SULFATE PRN MG: 2 INJECTION, SOLUTION INTRAMUSCULAR; INTRAVENOUS at 23:43

## 2020-08-13 NOTE — HP
ADMIT DATE:  08/13/2020



CHIEF COMPLAINT:  Parotid infection.



HISTORY OF PRESENT ILLNESS:  The patient is a pleasant 75-year-old female who I

think saw Dr. Perla Castillo of the ENT service today.  She was told to go to

the ER.  We are going to admit the patient with infection to her parotid gland. 

We are going to be consulting Dr. Perla Castillo as I understood that she was

going to see the patient in the morning.  I discussed the case with ER

physician.



PAST MEDICAL HISTORY:  Arthritis, CAD, GERD, hypertension, hyperlipidemia,

chronic renal insufficiency, cholecystectomy, hysterectomy, tonsillectomy,

cardiac stents, light tobacco smoker.



ALLERGIES:  AMPICILLIN, CYCLOBENZAPRINE, LATEX, AND PRAVASTATIN.



FAMILY HISTORY:  Coronary artery disease.



SOCIAL HISTORY:  She drinks.  She smokes very lightly.  No drinking or drugs.



MEDICATIONS:  Reviewed, please refer to the MRAD.



REVIEW OF SYSTEMS:  

GENERAL:  No history of weight change, weakness or fevers.

SKIN:  No bruising, hair changes or rashes.

EYES:  No blurred, double or loss of vision.

HEENT:  She complains of neck pain and carotid pain.

HEART:  No history of palpitations, chest pain or shortness of breath on

exertion.

LUNGS:  Denies cough, hemoptysis, wheezing or shortness of breath.

GASTROINTESTINAL:  Denies changes in appetite, nausea, vomiting, diarrhea or

constipation.

GENITOURINARY:  No history of frequency, urgency, hesitancy or nocturia.

NEUROLOGIC:  Denies history of numbness, tingling, tremor or weakness.

PSYCHIATRIC:  No history of panic, anxiety or depression.

ENDOCRINE:  No history of heat or cold intolerance, polyuria or polydipsia.

EXTREMITIES:  Denies muscle weakness, joint pain, pain on walking or stiffness.



PHYSICAL EXAMINATION:

VITALS:  Within normal limits and are stable.

GENERAL:  No apparent distress.  Alert and oriented.

HEENT:  The left carotid is swollen.

EYES:  Extraocular muscles are intact, pupils are equally round and reactive to

light and accommodation

MUSCULOSKELETAL:  Well developed, well nourished, good range of motion

ENDOCRINE:  No thyromegaly was palpated

LYMPHATICS:  No cervical chain or axillary nodes were noted

HEMATOPOIETIC:  No bruising

NECK:  Supple, no JVD, no thyromegaly was noted.

LUNGS:  Clear to auscultation in all lung fields without rhonchi or wheezing.

HEART:  RRR, S1, S2 present.  Peripheral pulses intact, no obvious murmurs were

noted.

ABDOMEN:  Soft, nontender.  Positive bowel sounds no organomegaly, normal bowel

sounds.

EXTREMITIES:  Without any cyanosis, clubbing, or edema.  Pedal pulses intact,

Homans sign is negative.

NEUROLOGIC:  Normal speech, normal tone.  A & O x3, moves all extremities, no

obvious focal deficits.

PSYCHIATRIC:  Normal affect, normal mood.  Stable.

SKIN:  No ulcerations or rashes, good skin turgor, no jaundice.

VASCULAR:  Good capillary refill, neurovascular bundle appears to be intact.



LABORATORY DATA:  White count is 9.  Electrolytes are normal.



ASSESSMENT AND PLAN:  Parotid swelling, infection.  The patient has been

admitted.  We will consult Perla Castillo MD, ENT p.r.n. morphine, IV

ceftriaxone 1 gram every day.  Home meds, DVT prophylaxis.  Full code.

 



______________________________

ANGELINA CAMPBELL DO



DR:  FAREED/abhishek  JOB#:  [id number] / [id number]

DD:  08/13/2020 19:50  DT:  08/13/2020 20:18

## 2020-08-13 NOTE — PHYS DOC
Past Medical History


Past Medical History:  Arthritis, CAD, GERD, Hypertension, MI


Additional Past Medical Histor:  CHRONIC RENAL INSUFFICIENCY


Past Surgical History:  Appendectomy, Cholecystectomy, Hysterectomy, 

Tonsillectomy, Other


Additional Past Surgical Histo:  CARDIAC STENTSX2


Smoking Status:  Light Tobacco Smoker


Alcohol Use:  Occasionally





General Adult


EDM:


Chief Complaint:  Neck Pain





HPI:


HPI:





Patient is a 75  year old female who presents with left neck swelling.  Patient 

saw her primary care doctor for this on Monday and at that time was started on 

doxycycline.  She states that the initial swelling started on Saturday and was 

the size of the tip of her pinky.  She states now it is much larger.  She denies

any shortness of breath or difficulty with swallowing or talking.  She has not 

been having fevers, chills, sweats, nausea, vomiting.  She is never had anything

like this previously.  I discussed the case with her primary care physician who 

has discussed it with Dr. Castillo the ENT physician.  ENT felt like she was 

going to need IV antibiotics and sent her here for admission.





Review of Systems:


Review of Systems:


General: Denies fever, chills, sweats, fatigue


Eyes: Denies drainage, blurred vision, eye redness


HENT: Denies rhinorrhea, sore throat, earache


Respiratory: Denies cough, shortness of breath, wheezing


Cardiac: Denies edema, palpitations, chest pain


GI: Denies abdominal pain, Nausea, vomiting


MSK: Denies back pain.  Reports neck pain


Skin: Denies rash, jaundice


Neuro: Denies headache, dizziness


Psychiatric: Denies SI/HI





Heart Score:


Risk Factors:


Risk Factors:  DM, Current or recent (<one month) smoker, HTN, HLP, family 

history of CAD, obesity.


Risk Scores:


Score 0 - 3:  2.5% MACE over next 6 weeks - Discharge Home


Score 4 - 6:  20.3% MACE over next 6 weeks - Admit for Clinical Observation


Score 7 - 10:  72.7% MACE over next 6 weeks - Early Invasive Strategies





Current Medications:





Current Medications








 Medications


  (Trade)  Dose


 Ordered  Sig/Lisa  Start Time


 Stop Time Status Last Admin


Dose Admin


 


 Ceftriaxone Sodium


  (Rocephin)  1 gm  1X  ONCE  8/13/20 17:00


 8/13/20 17:01 DC  














Allergies:


Allergies:





Allergies








Coded Allergies Type Severity Reaction Last Updated Verified


 


  ampicillin Allergy Intermediate  3/16/16 Yes


 


  cyclobenzaprine Allergy Intermediate  3/16/16 Yes


 


  latex Allergy Intermediate  3/16/16 Yes


 


  pravastatin Allergy Intermediate  1/25/18 Yes











Physical Exam:


PE:


General: Awake, alert, NAD. Well Nourished, well hydrated. Cooperative


HEENT: Atraumatic, EOMI, PERRL, airway patent, moist oral mucosa


Neck: Supple, trachea midline, left 3x7cm area of swelling and induration with 

mild erythema


Respiratory: CTA bilaterally, normal effort, no wheezing/crackles


CV: RRR, no murmur, cap refill <2


GI: Soft, nondistended, nontender, no masses


MSK: No obvious deformities


Skin: Warm, dry, intact


Neuro: A&O x3, speech NL, sensory and motor grossly intact, no focal deficits


Psych: Normal affect, normal mood, not suicidal or homicidal





Current Patient Data:


Labs:





                                Laboratory Tests








Test


 8/13/20


16:55


 


White Blood Count


 9.4 x10^3/uL


(4.0-11.0)


 


Red Blood Count


 4.01 x10^6/uL


(3.50-5.40)


 


Hemoglobin


 12.5 g/dL


(12.0-15.5)


 


Hematocrit


 36.9 %


(36.0-47.0)


 


Mean Corpuscular Volume


 92 fL ()





 


Mean Corpuscular Hemoglobin 31 pg (25-35)  


 


Mean Corpuscular Hemoglobin


Concent 34 g/dL


(31-37)


 


Red Cell Distribution Width


 13.0 %


(11.5-14.5)


 


Platelet Count


 203 x10^3/uL


(140-400)


 


Neutrophils (%) (Auto) 60 % (31-73)  


 


Lymphocytes (%) (Auto) 26 % (24-48)  


 


Monocytes (%) (Auto) 12 % (0-9)  H


 


Eosinophils (%) (Auto) 2 % (0-3)  


 


Basophils (%) (Auto) 1 % (0-3)  


 


Neutrophils # (Auto)


 5.6 x10^3/uL


(1.8-7.7)


 


Lymphocytes # (Auto)


 2.4 x10^3/uL


(1.0-4.8)


 


Monocytes # (Auto)


 1.1 x10^3/uL


(0.0-1.1)


 


Eosinophils # (Auto)


 0.2 x10^3/uL


(0.0-0.7)


 


Basophils # (Auto)


 0.1 x10^3/uL


(0.0-0.2)


 


Sodium Level


 141 mmol/L


(136-145)


 


Potassium Level


 3.9 mmol/L


(3.5-5.1)


 


Chloride Level


 99 mmol/L


()


 


Carbon Dioxide Level


 35 mmol/L


(21-32)  H


 


Anion Gap 7 (6-14)  


 


Blood Urea Nitrogen


 16 mg/dL


(7-20)


 


Creatinine


 1.0 mg/dL


(0.6-1.0)


 


Estimated GFR


(Cockcroft-Gault) 54.3  





 


Glucose Level


 148 mg/dL


(70-99)  H


 


Calcium Level


 8.7 mg/dL


(8.5-10.1)





                                Laboratory Tests


8/13/20 16:55








                                Laboratory Tests


8/13/20 16:55








Vital Signs:





                                   Vital Signs








  Date Time  Temp Pulse Resp B/P (MAP) Pulse Ox O2 Delivery O2 Flow Rate FiO2


 


8/13/20 16:34 98.8 79 20 146/73 (97) 93 Room Air  





 98.8       











EKG:


EKG:


[]





Radiology/Procedures:


Radiology/Procedures:


[]





Course & Med Decision Making:


Course & Med Decision Making


Pertinent Labs and Imaging studies reviewed. (See chart for details)





Patient is 75-year-old female who presents to the emergency room with a salivary

 gland infection.  Patient was sent here for IV antibiotics.  She will be 

admitted for further care by ENT and hospitalist.





Jonelle Disclaimer:


Dragon Disclaimer:


This electronic medical record was generated, in whole or in part, using a voice

 recognition dictation system.





Departure


Departure


Impression:  


   Primary Impression:  


   Parotitis not due to mumps


Disposition:  09 ADMITTED AS INPATIENT


Condition:  STABLE


Referrals:  


SALMA AGEE MD (PCP)





Justicifation of Admission Dx:


Justifications for Admission:


Justification of Admission Dx:  Yes











RENETTA HALEY MD              Aug 13, 2020 17:30

## 2020-08-14 VITALS — DIASTOLIC BLOOD PRESSURE: 49 MMHG | SYSTOLIC BLOOD PRESSURE: 101 MMHG

## 2020-08-14 VITALS — DIASTOLIC BLOOD PRESSURE: 62 MMHG | SYSTOLIC BLOOD PRESSURE: 128 MMHG

## 2020-08-14 VITALS — SYSTOLIC BLOOD PRESSURE: 124 MMHG | DIASTOLIC BLOOD PRESSURE: 60 MMHG

## 2020-08-14 VITALS — DIASTOLIC BLOOD PRESSURE: 73 MMHG | SYSTOLIC BLOOD PRESSURE: 141 MMHG

## 2020-08-14 VITALS — DIASTOLIC BLOOD PRESSURE: 72 MMHG | SYSTOLIC BLOOD PRESSURE: 123 MMHG

## 2020-08-14 VITALS — DIASTOLIC BLOOD PRESSURE: 56 MMHG | SYSTOLIC BLOOD PRESSURE: 135 MMHG

## 2020-08-14 VITALS — SYSTOLIC BLOOD PRESSURE: 122 MMHG | DIASTOLIC BLOOD PRESSURE: 55 MMHG

## 2020-08-14 VITALS — DIASTOLIC BLOOD PRESSURE: 82 MMHG | SYSTOLIC BLOOD PRESSURE: 196 MMHG

## 2020-08-14 RX ADMIN — METHOCARBAMOL SCH MG: 500 TABLET ORAL at 12:55

## 2020-08-14 RX ADMIN — TRAZODONE HYDROCHLORIDE SCH MG: 100 TABLET ORAL at 22:53

## 2020-08-14 RX ADMIN — IPRATROPIUM BROMIDE SCH MG: 0.5 SOLUTION RESPIRATORY (INHALATION) at 16:30

## 2020-08-14 RX ADMIN — CITALOPRAM HYDROBROMIDE SCH MG: 10 TABLET ORAL at 09:04

## 2020-08-14 RX ADMIN — CEFTRIAXONE SCH GM: 1 INJECTION, POWDER, FOR SOLUTION INTRAMUSCULAR; INTRAVENOUS at 17:07

## 2020-08-14 RX ADMIN — CARVEDILOL SCH MG: 6.25 TABLET, FILM COATED ORAL at 09:04

## 2020-08-14 RX ADMIN — MORPHINE SULFATE PRN MG: 2 INJECTION, SOLUTION INTRAMUSCULAR; INTRAVENOUS at 12:55

## 2020-08-14 RX ADMIN — CARVEDILOL SCH MG: 6.25 TABLET, FILM COATED ORAL at 17:07

## 2020-08-14 RX ADMIN — ASPIRIN SCH MG: 325 TABLET, DELAYED RELEASE ORAL at 09:03

## 2020-08-14 RX ADMIN — RANOLAZINE SCH MG: 500 TABLET, FILM COATED, EXTENDED RELEASE ORAL at 09:00

## 2020-08-14 RX ADMIN — HYDROCODONE BITARTRATE AND ACETAMINOPHEN PRN TAB: 5; 325 TABLET ORAL at 09:06

## 2020-08-14 RX ADMIN — FUROSEMIDE SCH MG: 20 TABLET ORAL at 09:03

## 2020-08-14 RX ADMIN — LOSARTAN POTASSIUM SCH MG: 50 TABLET ORAL at 09:03

## 2020-08-14 RX ADMIN — VITAMIN D, TAB 1000IU (100/BT) SCH UNIT: 25 TAB at 09:02

## 2020-08-14 RX ADMIN — ATORVASTATIN CALCIUM SCH MG: 40 TABLET, FILM COATED ORAL at 22:53

## 2020-08-14 RX ADMIN — IPRATROPIUM BROMIDE SCH MG: 0.5 SOLUTION RESPIRATORY (INHALATION) at 09:26

## 2020-08-14 RX ADMIN — CYANOCOBALAMIN TAB 1000 MCG SCH MCG: 1000 TAB at 09:02

## 2020-08-14 RX ADMIN — GABAPENTIN SCH MG: 300 CAPSULE ORAL at 09:02

## 2020-08-14 RX ADMIN — Medication SCH CAP: at 22:53

## 2020-08-14 RX ADMIN — SENNOSIDES AND DOCUSATE SODIUM SCH TAB: 8.6; 5 TABLET ORAL at 09:02

## 2020-08-14 RX ADMIN — PANTOPRAZOLE SODIUM SCH MG: 40 TABLET, DELAYED RELEASE ORAL at 12:55

## 2020-08-14 RX ADMIN — GABAPENTIN SCH MG: 300 CAPSULE ORAL at 14:30

## 2020-08-14 RX ADMIN — RANOLAZINE SCH MG: 500 TABLET, FILM COATED, EXTENDED RELEASE ORAL at 21:00

## 2020-08-14 RX ADMIN — GABAPENTIN SCH MG: 300 CAPSULE ORAL at 22:53

## 2020-08-14 RX ADMIN — CLOPIDOGREL BISULFATE SCH MG: 75 TABLET ORAL at 09:04

## 2020-08-14 RX ADMIN — CARVEDILOL SCH MG: 12.5 TABLET, FILM COATED ORAL at 17:43

## 2020-08-14 RX ADMIN — MORPHINE SULFATE PRN MG: 2 INJECTION, SOLUTION INTRAMUSCULAR; INTRAVENOUS at 03:37

## 2020-08-14 RX ADMIN — HYDROCODONE BITARTRATE AND ACETAMINOPHEN PRN TAB: 5; 325 TABLET ORAL at 23:17

## 2020-08-14 NOTE — PDOC
PROGRESS NOTES


Date of Service:


DATE: 8/14/20 


TIME: 08:51





Chief Complaint


Chief Complaint





ASSESSMENT AND PLAN: 





Parotid swelling, infection.  


morbid obesity








admitted.  





consult Perla Castillo MD, ENT 


p.r.n. morphine, IV


ceftriaxone 1 gram every day.  


Home meds, 


DVT prophylaxis.  


Full code.





Vitals


Vitals





Vital Signs








  Date Time  Temp Pulse Resp B/P (MAP) Pulse Ox O2 Delivery O2 Flow Rate FiO2


 


8/14/20 07:00 99.0 83 14 141/73 (95) 96 Nasal Cannula 2.0 





 99.0       











Physical Exam


Physical Exam


HEENT:  The left PAROTID  is swollen., TENDERNESS SLOW TO RESOLVE 


EYES:  Extraocular muscles are intact, pupils are equally round and reactive to


light and accommodation


MUSCULOSKELETAL:  Well developed, well nourished, good range of motion


ENDOCRINE:  No thyromegaly was palpated


LYMPHATICS:  No cervical chain or axillary nodes were noted


HEMATOPOIETIC:  No bruising


NECK:  Supple, no JVD, no thyromegaly was noted.


LUNGS:  Clear to auscultation in all lung fields without rhonchi or wheezing.


HEART:  RRR, S1, S2 present.  Peripheral pulses intact, no obvious murmurs were


noted.


ABDOMEN:  Soft, nontender.  Positive bowel sounds no organomegaly, normal bowel


sounds.


EXTREMITIES:  Without any cyanosis, clubbing, or edema.  Pedal pulses intact,


Homans sign is negative.


NEUROLOGIC:  Normal speech, normal tone.  A & O x3, moves all extremities, no


obvious focal deficits.


PSYCHIATRIC:  Normal affect, normal mood.  Stable.


SKIN:  No ulcerations or rashes, good skin turgor, no jaundice.


VASCULAR:  Good capillary refill, neurovascular bundle appears to be intact.


General:  Alert, Oriented X3, Cooperative, No acute distress


Lungs:  Clear


Abdomen:  Normal bowel sounds, Soft


Extremities:  No cyanosis, No edema





Labs


LABS


EYES:  Extraocular muscles are intact, pupils are equally round and reactive to


light and accommodation


MUSCULOSKELETAL:  Well developed, well nourished, good range of motion


ENDOCRINE:  No thyromegaly was palpated


LYMPHATICS:  No cervical chain or axillary nodes were noted


HEMATOPOIETIC:  No bruising


NECK:  Supple, no JVD, no thyromegaly was noted.


LUNGS:  Clear to auscultation in all lung fields without rhonchi or wheezing.


HEART:  RRR, S1, S2 present.  Peripheral pulses intact, no obvious murmurs were


noted.


ABDOMEN:  Soft, nontender.  Positive bowel sounds no organomegaly, normal bowel


sounds.


EXTREMITIES:  Without any cyanosis, clubbing, or edema.  Pedal pulses intact,


Homans sign is negative.


NEUROLOGIC:  Normal speech, normal tone.  A & O x3, moves all extremities, no


obvious focal deficits.


PSYCHIATRIC:  Normal affect, normal mood.  Stable.


SKIN:  No ulcerations or rashes, good skin turgor, no jaundice.


VASCULAR:  Good capillary refill, neurovascular bundle appears to be intact.





Laboratory Tests








Test


 8/13/20


16:55


 


White Blood Count


 9.4 x10^3/uL


(4.0-11.0)


 


Red Blood Count


 4.01 x10^6/uL


(3.50-5.40)


 


Hemoglobin


 12.5 g/dL


(12.0-15.5)


 


Hematocrit


 36.9 %


(36.0-47.0)


 


Mean Corpuscular Volume 92 fL () 


 


Mean Corpuscular Hemoglobin 31 pg (25-35) 


 


Mean Corpuscular Hemoglobin


Concent 34 g/dL


(31-37)


 


Red Cell Distribution Width


 13.0 %


(11.5-14.5)


 


Platelet Count


 203 x10^3/uL


(140-400)


 


Neutrophils (%) (Auto) 60 % (31-73) 


 


Lymphocytes (%) (Auto) 26 % (24-48) 


 


Monocytes (%) (Auto) 12 % (0-9) 


 


Eosinophils (%) (Auto) 2 % (0-3) 


 


Basophils (%) (Auto) 1 % (0-3) 


 


Neutrophils # (Auto)


 5.6 x10^3/uL


(1.8-7.7)


 


Lymphocytes # (Auto)


 2.4 x10^3/uL


(1.0-4.8)


 


Monocytes # (Auto)


 1.1 x10^3/uL


(0.0-1.1)


 


Eosinophils # (Auto)


 0.2 x10^3/uL


(0.0-0.7)


 


Basophils # (Auto)


 0.1 x10^3/uL


(0.0-0.2)


 


Sodium Level


 141 mmol/L


(136-145)


 


Potassium Level


 3.9 mmol/L


(3.5-5.1)


 


Chloride Level


 99 mmol/L


()


 


Carbon Dioxide Level


 35 mmol/L


(21-32)


 


Anion Gap 7 (6-14) 


 


Blood Urea Nitrogen


 16 mg/dL


(7-20)


 


Creatinine


 1.0 mg/dL


(0.6-1.0)


 


Estimated GFR


(Cockcroft-Gault) 54.3 





 


Glucose Level


 148 mg/dL


(70-99)


 


Calcium Level


 8.7 mg/dL


(8.5-10.1)











Assessment and Plan


Assessmemt and Plan


Problems


Medical Problems:


(1) Parotitis not due to mumps


Status: Acute  











Comment


Review of Relevant


I have reviewed the following items irma (where applicable) has been applied.


Labs





Laboratory Tests








Test


 8/13/20


16:55


 


White Blood Count


 9.4 x10^3/uL


(4.0-11.0)


 


Red Blood Count


 4.01 x10^6/uL


(3.50-5.40)


 


Hemoglobin


 12.5 g/dL


(12.0-15.5)


 


Hematocrit


 36.9 %


(36.0-47.0)


 


Mean Corpuscular Volume 92 fL () 


 


Mean Corpuscular Hemoglobin 31 pg (25-35) 


 


Mean Corpuscular Hemoglobin


Concent 34 g/dL


(31-37)


 


Red Cell Distribution Width


 13.0 %


(11.5-14.5)


 


Platelet Count


 203 x10^3/uL


(140-400)


 


Neutrophils (%) (Auto) 60 % (31-73) 


 


Lymphocytes (%) (Auto) 26 % (24-48) 


 


Monocytes (%) (Auto) 12 % (0-9) 


 


Eosinophils (%) (Auto) 2 % (0-3) 


 


Basophils (%) (Auto) 1 % (0-3) 


 


Neutrophils # (Auto)


 5.6 x10^3/uL


(1.8-7.7)


 


Lymphocytes # (Auto)


 2.4 x10^3/uL


(1.0-4.8)


 


Monocytes # (Auto)


 1.1 x10^3/uL


(0.0-1.1)


 


Eosinophils # (Auto)


 0.2 x10^3/uL


(0.0-0.7)


 


Basophils # (Auto)


 0.1 x10^3/uL


(0.0-0.2)


 


Sodium Level


 141 mmol/L


(136-145)


 


Potassium Level


 3.9 mmol/L


(3.5-5.1)


 


Chloride Level


 99 mmol/L


()


 


Carbon Dioxide Level


 35 mmol/L


(21-32)


 


Anion Gap 7 (6-14) 


 


Blood Urea Nitrogen


 16 mg/dL


(7-20)


 


Creatinine


 1.0 mg/dL


(0.6-1.0)


 


Estimated GFR


(Cockcroft-Gault) 54.3 





 


Glucose Level


 148 mg/dL


(70-99)


 


Calcium Level


 8.7 mg/dL


(8.5-10.1)








Laboratory Tests








Test


 8/13/20


16:55


 


White Blood Count


 9.4 x10^3/uL


(4.0-11.0)


 


Red Blood Count


 4.01 x10^6/uL


(3.50-5.40)


 


Hemoglobin


 12.5 g/dL


(12.0-15.5)


 


Hematocrit


 36.9 %


(36.0-47.0)


 


Mean Corpuscular Volume 92 fL () 


 


Mean Corpuscular Hemoglobin 31 pg (25-35) 


 


Mean Corpuscular Hemoglobin


Concent 34 g/dL


(31-37)


 


Red Cell Distribution Width


 13.0 %


(11.5-14.5)


 


Platelet Count


 203 x10^3/uL


(140-400)


 


Neutrophils (%) (Auto) 60 % (31-73) 


 


Lymphocytes (%) (Auto) 26 % (24-48) 


 


Monocytes (%) (Auto) 12 % (0-9) 


 


Eosinophils (%) (Auto) 2 % (0-3) 


 


Basophils (%) (Auto) 1 % (0-3) 


 


Neutrophils # (Auto)


 5.6 x10^3/uL


(1.8-7.7)


 


Lymphocytes # (Auto)


 2.4 x10^3/uL


(1.0-4.8)


 


Monocytes # (Auto)


 1.1 x10^3/uL


(0.0-1.1)


 


Eosinophils # (Auto)


 0.2 x10^3/uL


(0.0-0.7)


 


Basophils # (Auto)


 0.1 x10^3/uL


(0.0-0.2)


 


Sodium Level


 141 mmol/L


(136-145)


 


Potassium Level


 3.9 mmol/L


(3.5-5.1)


 


Chloride Level


 99 mmol/L


()


 


Carbon Dioxide Level


 35 mmol/L


(21-32)


 


Anion Gap 7 (6-14) 


 


Blood Urea Nitrogen


 16 mg/dL


(7-20)


 


Creatinine


 1.0 mg/dL


(0.6-1.0)


 


Estimated GFR


(Cockcroft-Gault) 54.3 





 


Glucose Level


 148 mg/dL


(70-99)


 


Calcium Level


 8.7 mg/dL


(8.5-10.1)








Medications





Current Medications


Ceftriaxone Sodium (Rocephin) 1 gm 1X  ONCE IVP  Last administered on 8/13/20at 

17:26;  Start 8/13/20 at 17:00;  Stop 8/13/20 at 17:01;  Status DC


Morphine Sulfate (Morphine Sulfate) 2 mg PRN Q2HR  PRN IV PAIN Last administered

on 8/14/20at 03:37;  Start 8/13/20 at 19:30


Ceftriaxone Sodium (Rocephin) 1 gm Q24H IVP ;  Start 8/14/20 at 17:00


Aspirin (Ecotrin) 325 mg DAILYWBKFT PO ;  Start 8/14/20 at 08:00


Carvedilol (Coreg) 6.25 mg BIDWMEALS PO ;  Start 8/14/20 at 08:00


Vitamin D (Vitamin D3) 2,000 unit DAILY PO ;  Start 8/14/20 at 09:00


Citalopram Hydrobromide (CeleXA) 10 mg DAILY PO ;  Start 8/14/20 at 09:00


Clopidogrel Bisulfate (Plavix) 75 mg DAILYWBKFT PO ;  Start 8/14/20 at 08:00


Cyanocobalamin (Vitamin B-12) 5,000 mcg DAILY PO ;  Start 8/14/20 at 09:00


Diphenhydramine HCl (Benadryl) 25 mg PRN DAILY  PRN PO ALLERGIES;  Start 8/14/20

at 05:45


Fluticasone Propionate (Flonase) 2 spray PRN DAILY  PRN NS CONGESTION;  Start 

8/14/20 at 05:45


Furosemide (Lasix) 20 mg DAILY PO ;  Start 8/14/20 at 09:00


Ipratropium Bromide (Atrovent) 0.2 mg Q8HRS IH ;  Start 8/14/20 at 06:00


Methocarbamol (Robaxin) 500 mg DAILY PO ;  Start 8/14/20 at 09:00


Nitroglycerin (Nitrostat) 0.4 mg PRN Q5MIN  PRN SL CHEST PAIN;  Start 8/14/20 at

05:45


Ranolazine (Ranexa) 500 mg BID PO ;  Start 8/14/20 at 09:00


Senna/Docusate Sodium (Senna Plus) 1 tab DAILY PO ;  Start 8/14/20 at 09:00


Trazodone HCl (Desyrel) 100 mg QHS PO ;  Start 8/14/20 at 21:00


Atorvastatin Calcium (Lipitor) 80 mg QHS PO ;  Start 8/14/20 at 21:00


Gabapentin (Neurontin) 600 mg TID PO ;  Start 8/14/20 at 09:00


Losartan Potassium (Cozaar) 100 mg DAILY PO ;  Start 8/14/20 at 09:00


Pantoprazole Sodium (Protonix) 40 mg DAILYAC PO ;  Start 8/14/20 at 07:30


Non-Formulary Medication (Rosuvastatin Calcium (Crestor)) 1 tab DAILY PO ;  

Start 8/14/20 at 09:00;  Status UNV


Acetaminophen/ Hydrocodone Bitart (Lortab 5/325) 1 tab PRN Q4HRS  PRN PO 

MODERATE PAIN 4-6;  Start 8/14/20 at 05:45





Active Scripts


Active


Aspirin Ec (Aspirin) 325 Mg Tablet.dr 325 Mg PO DAILYWBKFT


Clopidogrel (Clopidogrel Bisulfate) 75 Mg Tablet 75 Mg PO DAILYWBKFT


Reported


Stool Softener Tablet (Sennosides/Docusate Sodium) 1 Each Tablet 1 Each PO DAILY


NITROGLYCERIN SubLingual (Nitroglycerin) 0.4 Mg Tab.subl 0.4 Mg SL PRN Q5MIN PRN


Benadryl (Diphenhydramine Hcl) 25 Mg Capsule 1 Cap PO DAILY PRN 30 Days


Fluticasone Propionate Nasal Spray (Fluticasone Propionate) 16 Gm Spray.susp 2 

Spray NS DAILY PRN


Crestor (Rosuvastatin Calcium) 40 Mg Tablet 1 Tab PO DAILY


Ranexa (Ranolazine) 500 Mg Tab.er.12h 1 Tab PO BID 30 Days


Robaxin-750 (Methocarbamol) 750 Mg Tablet 500 Mg PO DAILY 30 Days


Hydrocodone-Acetamin 5-325 mg (Hydrocodone/Acetaminophen) 1 Each Tablet 1 Each 

PO Q4HRS PRN


Furosemide 20 Mg Tablet 1 Tab PO DAILY


Citalopram Hbr (Citalopram Hydrobromide) 10 Mg Tablet 10 Mg PO DAILY


Losartan Potassium 100 Mg Tablet 100 Mg PO DAILY


Ipratropium Bromide 0.2 Mg/1 Ml Solution 0.2 Mg IH Q8HRS


Spiriva (Tiotropium Bromide) 18 Mcg Cap.w.dev 1 Cap IH DAILY


Carvedilol ** (Carvedilol) 6.25 Mg Tablet 1 Tab PO BID


Vitamin D3 (Cholecalciferol (Vitamin D3)) 1,000 Unit Tablet 2,000 Unit PO DAILY


Vitamin B-12 (Cyanocobalamin (Vitamin B-12)) 1,000 Mcg Tablet 5,000 Mcg PO DAILY


Omeprazole 40 Mg Capsule.dr 40 Mg PO DAILY


Atorvastatin Calcium 80 Mg Tablet 80 Mg PO HS


Trazodone Hcl 100 Mg Tablet 1 Tab PO QHS


Gabapentin 600 Mg Tablet 600 Mg PO TID


Vitals/I & O





Vital Sign - Last 24 Hours








 8/13/20 8/13/20 8/13/20 8/13/20





 16:34 17:28 17:58 18:28


 


Temp 98.8   





 98.8   


 


Pulse 79 76 76 76


 


Resp 20 18 20 20


 


B/P (MAP) 146/73 (97) 202/83 (122) 176/88 (117) 202/105 (137)


 


Pulse Ox 93 90 91 


 


O2 Delivery Room Air   


 


    





    





 8/13/20 8/13/20 8/13/20 8/13/20





 19:15 19:31 20:00 20:01


 


Temp 99.4   





 99.4   


 


Pulse 84   


 


Resp 18 20  18


 


B/P (MAP) 146/97 (113)   


 


Pulse Ox 96 93  93


 


O2 Delivery   Nasal Cannula Nasal Cannula


 


O2 Flow Rate   2.0 


 


    





    





 8/13/20 8/13/20 8/14/20 8/14/20





 23:00 23:43 00:13 03:00


 


Temp 99.3   99.2





 99.3   99.2


 


Pulse 83   85


 


Resp 16 16 18 16


 


B/P (MAP) 140/63 (88)   128/62 (84)


 


Pulse Ox 96 93 93 94


 


O2 Delivery  Nasal Cannula Nasal Cannula 


 


O2 Flow Rate   2.0 


 


    





    





 8/14/20 8/14/20 8/14/20 





 03:37 04:07 07:00 


 


Temp   99.0 





   99.0 


 


Pulse   83 


 


Resp 18 16 14 


 


B/P (MAP)   141/73 (95) 


 


Pulse Ox 93 93 96 


 


O2 Delivery Nasal Cannula Nasal Cannula Nasal Cannula 


 


O2 Flow Rate 2.0 2.0 2.0 














Intake and Output   


 


 8/13/20 8/13/20 8/14/20





 15:00 23:00 07:00


 


Intake Total   300 ml


 


Balance   300 ml











Justicifation of Admission Dx:


Justifications for Admission:


Justification of Admission Dx:  Yes











ELISHA SCOTT MD          Aug 14, 2020 08:53

## 2020-08-14 NOTE — NUR
Patient /55 HR 99  - 1hr after medication administration. Patient is experiencing no 
pain at this time.

## 2020-08-14 NOTE — NUR
Patient was walked to the bathroom by family member. Patient stated that her knees buckled 
and she fell back on her family member, and the family member slide her down to the ground. 
Patient denies any injuries. Patient doesn't complain of any pain. MERVIN Robledo obtain vitals 
post-fall : 196/82 hr 89 o2 92%. There were no visible injuries. Patient was assisted to the 
bed and the bed alarm was put on. Family member present. Dr. Morales was paged regarding 
the fall.

## 2020-08-14 NOTE — NUR
SW following. Discussed with RN, pt from home with sister, emi diet, 2L o2 at night - has at 
home. Consult for Perla Castillo ENT. SW will continue to follow.

## 2020-08-14 NOTE — NUR
Patient refused med : Ranexa. Patient is upset that the ENT DRBj has not arrived. Patient 
threaten to leave because she believes she has been at [place] for two days. Patient 
arrived to [place] last night. Patient's family member stated that she tends to be 
forgetful.

## 2020-08-14 NOTE — PDOC2
CONSULT


Date of Consult


Date of Consult


DATE: 8/14/20 


TIME: 15:40





Reason for Consult


Reason for Consult:


left parotid sialoadenitis





Referring Physician


Referring Physician:


Dr. Pang





Identification/Chief Complaint


Chief Complaint


left parotid sialoadenitis





Source


Source:  Patient





History of Present Illness


Reason for Visit:


75 year old female was admitted last night for IV antibiotics in treatment of 

left parotid sialoadenitis.   Patient reports that she began to have swelling of

left neck on Saturday, 8 August.  She was seen by PCP earlier this week and 

prescribed doxycycline.  She reports that pain and swelling continued to worsen.

  She underwent CT imaging yesterday which showed enlarged lymph node posterior 

left parotid gland and inflammation of parotid gland.  No fluid collection or 

abscess.    She was started on IV Rocephin.  Patient reports minimal improvement

in symptoms





Past Medical History


Cardiovascular:  CAD, HTN, MI, Hyperlipidemia


Pulmonary:  Asthma, COPD


CENTRAL NERVOUS SYSTEM:  Other


GI:  GERD


Heme/Onc:  No pertinent hx


Hepatobiliary:  No pertinent hx


Psych:  No pertinent hx


Musculoskeletal:  No pain


Rheumatologic:  No pertinent hx


Infectious disease:  No pertinent hx


Renal/:  Chronic renal insuff


Endocrine:  No pertinent hx





Past Surgical History


Past Surgical History:  Appendectomy, Cholecystectomy, Tonsillectomy, 

Hysterectomy





Family History


Family History:  High Cholestrol





Social History


<1 pack per day


ALCOHOL:  social


Drugs:  None


Lives:  with Family





Current Problem List


Problem List


Problems


Medical Problems:


(1) Parotitis not due to mumps


Status: Acute  











Current Medications


Current Medications





Current Medications


Ceftriaxone Sodium (Rocephin) 1 gm 1X  ONCE IVP  Last administered on 8/13/20at 

17:26;  Start 8/13/20 at 17:00;  Stop 8/13/20 at 17:01;  Status DC


Morphine Sulfate (Morphine Sulfate) 2 mg PRN Q2HR  PRN IV PAIN Last administered

on 8/14/20at 12:55;  Start 8/13/20 at 19:30


Ceftriaxone Sodium (Rocephin) 1 gm Q24H IVP ;  Start 8/14/20 at 17:00


Aspirin (Ecotrin) 325 mg DAILYWBKFT PO  Last administered on 8/14/20at 09:03;  

Start 8/14/20 at 08:00


Carvedilol (Coreg) 6.25 mg BIDWMEALS PO  Last administered on 8/14/20at 09:04;  

Start 8/14/20 at 08:00


Vitamin D (Vitamin D3) 2,000 unit DAILY PO  Last administered on 8/14/20at 0

9:02;  Start 8/14/20 at 09:00


Citalopram Hydrobromide (CeleXA) 10 mg DAILY PO  Last administered on 8/14/20at 

09:04;  Start 8/14/20 at 09:00


Clopidogrel Bisulfate (Plavix) 75 mg DAILYWBKFT PO  Last administered on 

8/14/20at 09:04;  Start 8/14/20 at 08:00


Cyanocobalamin (Vitamin B-12) 5,000 mcg DAILY PO  Last administered on 8/14/20at

09:02;  Start 8/14/20 at 09:00


Diphenhydramine HCl (Benadryl) 25 mg PRN DAILY  PRN PO ALLERGIES;  Start 8/14/20

at 05:45


Fluticasone Propionate (Flonase) 2 spray PRN DAILY  PRN NS CONGESTION;  Start 

8/14/20 at 05:45


Furosemide (Lasix) 20 mg DAILY PO  Last administered on 8/14/20at 09:03;  Start 

8/14/20 at 09:00


Ipratropium Bromide (Atrovent) 0.2 mg Q8HRS IH ;  Start 8/14/20 at 06:00


Methocarbamol (Robaxin) 500 mg DAILY PO  Last administered on 8/14/20at 12:55;  

Start 8/14/20 at 09:00


Nitroglycerin (Nitrostat) 0.4 mg PRN Q5MIN  PRN SL CHEST PAIN;  Start 8/14/20 at

05:45


Ranolazine (Ranexa) 500 mg BID PO ;  Start 8/14/20 at 09:00


Senna/Docusate Sodium (Senna Plus) 1 tab DAILY PO  Last administered on 

8/14/20at 09:02;  Start 8/14/20 at 09:00


Trazodone HCl (Desyrel) 100 mg QHS PO ;  Start 8/14/20 at 21:00


Atorvastatin Calcium (Lipitor) 80 mg QHS PO ;  Start 8/14/20 at 21:00


Gabapentin (Neurontin) 600 mg TID PO  Last administered on 8/14/20at 14:30;  

Start 8/14/20 at 09:00


Losartan Potassium (Cozaar) 100 mg DAILY PO  Last administered on 8/14/20at 

09:03;  Start 8/14/20 at 09:00


Pantoprazole Sodium (Protonix) 40 mg DAILYAC PO  Last administered on 8/14/20at 

12:55;  Start 8/14/20 at 07:30


Non-Formulary Medication (Rosuvastatin Calcium (Crestor)) 1 tab DAILY PO ;  

Start 8/14/20 at 09:00;  Status UNV


Acetaminophen/ Hydrocodone Bitart (Lortab 5/325) 1 tab PRN Q4HRS  PRN PO 

MODERATE PAIN 4-6 Last administered on 8/14/20at 09:06;  Start 8/14/20 at 05:45


Lactobacillus Rhamnosus (Culturelle) 1 cap BID PO ;  Start 8/14/20 at 21:00





Active Scripts


Active


Aspirin Ec (Aspirin) 325 Mg Tablet.dr 325 Mg PO DAILYWBKFT


Clopidogrel (Clopidogrel Bisulfate) 75 Mg Tablet 75 Mg PO DAILYWBKFT


Reported


Stool Softener Tablet (Sennosides/Docusate Sodium) 1 Each Tablet 1 Each PO DAILY


NITROGLYCERIN SubLingual (Nitroglycerin) 0.4 Mg Tab.subl 0.4 Mg SL PRN Q5MIN PRN


Benadryl (Diphenhydramine Hcl) 25 Mg Capsule 1 Cap PO DAILY PRN 30 Days


Fluticasone Propionate Nasal Spray (Fluticasone Propionate) 16 Gm Spray.susp 2 

Spray NS DAILY PRN


Crestor (Rosuvastatin Calcium) 40 Mg Tablet 1 Tab PO DAILY


Ranexa (Ranolazine) 500 Mg Tab.er.12h 1 Tab PO BID 30 Days


Robaxin-750 (Methocarbamol) 750 Mg Tablet 500 Mg PO DAILY 30 Days


Hydrocodone-Acetamin 5-325 mg (Hydrocodone/Acetaminophen) 1 Each Tablet 1 Each 

PO Q4HRS PRN


Furosemide 20 Mg Tablet 1 Tab PO DAILY


Citalopram Hbr (Citalopram Hydrobromide) 10 Mg Tablet 10 Mg PO DAILY


Losartan Potassium 100 Mg Tablet 100 Mg PO DAILY


Ipratropium Bromide 0.2 Mg/1 Ml Solution 0.2 Mg IH Q8HRS


Spiriva (Tiotropium Bromide) 18 Mcg Cap.w.dev 1 Cap IH DAILY


Carvedilol ** (Carvedilol) 6.25 Mg Tablet 1 Tab PO BID


Vitamin D3 (Cholecalciferol (Vitamin D3)) 1,000 Unit Tablet 2,000 Unit PO DAILY


Vitamin B-12 (Cyanocobalamin (Vitamin B-12)) 1,000 Mcg Tablet 5,000 Mcg PO DAILY


Omeprazole 40 Mg Capsule.dr 40 Mg PO DAILY


Atorvastatin Calcium 80 Mg Tablet 80 Mg PO HS


Trazodone Hcl 100 Mg Tablet 1 Tab PO QHS


Gabapentin 600 Mg Tablet 600 Mg PO TID





Allergies


Allergies:  


Coded Allergies:  


     ampicillin (Verified  Allergy, Intermediate, 3/16/16)


     cyclobenzaprine (Verified  Allergy, Intermediate, 3/16/16)


     dexamethasone (Verified  Allergy, Intermediate, 8/14/20)


   


   HEADACHE


     latex (Verified  Allergy, Intermediate, 3/16/16)


     pravastatin (Verified  Allergy, Intermediate, 1/25/18)


     albuterol (Verified  Allergy, Mild, 8/13/20)


   


   CAN TOLERATE SMALL AMOUNTS/PT





ROS


General:  No: Chills, Night Sweats, Fatigue, Malaise, Appetite, Other


PSYCHOLOGICAL ROS:  No: Anxiety, Behavioral Disorder, Concentration difficultie,

Decreased libido, Depression, Disorientation, Hallucinations, Hostility, 

Irritablity, Memory difficulties, Mood Swings, Obsessive thoughts, Physical 

abuse, Sexual abuse, Sleep disturbances, Suicidal ideation, Other


Eyes:  No Blurry vision, No Decreased vision, No Double vision, No Dry eyes, No 

Excessive tearing, No Eye Pain, No Itchy Eyes, No Loss of vision, No 

Photophobia, No Scotomata, No Uses contacts, No Uses glasses, No Other


HEENT:  YES: Heacaches; 


   No: Visual Changes, Hearing change, Nasal congestion, Nasal discharge, Oral 

lesions, Sinus pain, Sore Throat, Epistaxis, Sneezing, Snoring, Tinnitus, 

Vertigo, Vocal changes, Other


ALLERGY AND IMMUNOLOGY:  No: Hives, Insect Bite Sensitivity, Itchy/Watery Eyes, 

Nasal Congestion, Post Nasal Drip, Seasonal Allergies, Other


Hematological and Lymphatic:  YES: Swollen Lymph Nodes; 


   No: Bleeding Problems, Blood Clots, Blood Transfusions, Brusing, Night 

Sweats, Pallor, Other


Respiratory:  No: Cough, Hemoptysis, Orthopnea, Pleuritic Pain, Shortness of 

breath, SOB with excertion, Sputum Changes, Stridor, Tachypnea, Wheezing, Other


Cardiovascular:  No Chest Pain, No Palpitations, No Orthopnea, No Paroxysmal No

c. Dyspnea, No Edema, No Lt Headedness, No Other


Musculoskeletal:  Yes Swelling In: (left neck)


Neurological:  No Behavorial Changes, No Bowel/Bladder ControlChng, No 

Confusion, No Dizziness, No Gait Disturbance, No Headaches, No Impaired 

Coord/balance, No Memory Loss, No Numbness/Tingling, No Seizures, No Speech 

Problems, No Tremors, No Visual Changes, No Weakness, No Other





Physical Exam


General:  Alert, Oriented X3, Cooperative, No acute distress


HEENT:  Atraumatic, PERRLA, EOMI, Other (Ears:  normal tympanic membranes and 

ear canals bilaterally;  Nasal Cavity: Normal;  Oral Cavity:  fair dentition, no

trismus, normal tongue, Stensen's duct with no visible saliva on left, no 

palpable stone;  Neck:  diffuse swelling of left parotid, tender to palpation, 

no bertha abscess )


Lungs:  Clear to auscultation, Normal air movement


Heart:  Regular rate


Extremities:  No clubbing, No cyanosis, No edema


Skin:  No rashes


Neuro:  Normal gait, Normal speech, Cranial nerves 3-12 NL


Psych/Mental Status:  Mental status NL, Mood NL





Vitals


VITALS





Vital Signs








  Date Time  Temp Pulse Resp B/P (MAP) Pulse Ox O2 Delivery O2 Flow Rate FiO2


 


8/14/20 13:42   16     


 


8/14/20 11:24 99.1 77  124/60 (81) 97 Nasal Cannula 2.0 





 99.1       











Labs


Labs





Laboratory Tests








Test


 8/13/20


16:55


 


White Blood Count


 9.4 x10^3/uL


(4.0-11.0)


 


Red Blood Count


 4.01 x10^6/uL


(3.50-5.40)


 


Hemoglobin


 12.5 g/dL


(12.0-15.5)


 


Hematocrit


 36.9 %


(36.0-47.0)


 


Mean Corpuscular Volume 92 fL () 


 


Mean Corpuscular Hemoglobin 31 pg (25-35) 


 


Mean Corpuscular Hemoglobin


Concent 34 g/dL


(31-37)


 


Red Cell Distribution Width


 13.0 %


(11.5-14.5)


 


Platelet Count


 203 x10^3/uL


(140-400)


 


Neutrophils (%) (Auto) 60 % (31-73) 


 


Lymphocytes (%) (Auto) 26 % (24-48) 


 


Monocytes (%) (Auto) 12 % (0-9) 


 


Eosinophils (%) (Auto) 2 % (0-3) 


 


Basophils (%) (Auto) 1 % (0-3) 


 


Neutrophils # (Auto)


 5.6 x10^3/uL


(1.8-7.7)


 


Lymphocytes # (Auto)


 2.4 x10^3/uL


(1.0-4.8)


 


Monocytes # (Auto)


 1.1 x10^3/uL


(0.0-1.1)


 


Eosinophils # (Auto)


 0.2 x10^3/uL


(0.0-0.7)


 


Basophils # (Auto)


 0.1 x10^3/uL


(0.0-0.2)


 


Sodium Level


 141 mmol/L


(136-145)


 


Potassium Level


 3.9 mmol/L


(3.5-5.1)


 


Chloride Level


 99 mmol/L


()


 


Carbon Dioxide Level


 35 mmol/L


(21-32)


 


Anion Gap 7 (6-14) 


 


Blood Urea Nitrogen


 16 mg/dL


(7-20)


 


Creatinine


 1.0 mg/dL


(0.6-1.0)


 


Estimated GFR


(Cockcroft-Gault) 54.3 





 


Glucose Level


 148 mg/dL


(70-99)


 


Calcium Level


 8.7 mg/dL


(8.5-10.1)








Laboratory Tests








Test


 8/13/20


16:55


 


White Blood Count


 9.4 x10^3/uL


(4.0-11.0)


 


Red Blood Count


 4.01 x10^6/uL


(3.50-5.40)


 


Hemoglobin


 12.5 g/dL


(12.0-15.5)


 


Hematocrit


 36.9 %


(36.0-47.0)


 


Mean Corpuscular Volume 92 fL () 


 


Mean Corpuscular Hemoglobin 31 pg (25-35) 


 


Mean Corpuscular Hemoglobin


Concent 34 g/dL


(31-37)


 


Red Cell Distribution Width


 13.0 %


(11.5-14.5)


 


Platelet Count


 203 x10^3/uL


(140-400)


 


Neutrophils (%) (Auto) 60 % (31-73) 


 


Lymphocytes (%) (Auto) 26 % (24-48) 


 


Monocytes (%) (Auto) 12 % (0-9) 


 


Eosinophils (%) (Auto) 2 % (0-3) 


 


Basophils (%) (Auto) 1 % (0-3) 


 


Neutrophils # (Auto)


 5.6 x10^3/uL


(1.8-7.7)


 


Lymphocytes # (Auto)


 2.4 x10^3/uL


(1.0-4.8)


 


Monocytes # (Auto)


 1.1 x10^3/uL


(0.0-1.1)


 


Eosinophils # (Auto)


 0.2 x10^3/uL


(0.0-0.7)


 


Basophils # (Auto)


 0.1 x10^3/uL


(0.0-0.2)


 


Sodium Level


 141 mmol/L


(136-145)


 


Potassium Level


 3.9 mmol/L


(3.5-5.1)


 


Chloride Level


 99 mmol/L


()


 


Carbon Dioxide Level


 35 mmol/L


(21-32)


 


Anion Gap 7 (6-14) 


 


Blood Urea Nitrogen


 16 mg/dL


(7-20)


 


Creatinine


 1.0 mg/dL


(0.6-1.0)


 


Estimated GFR


(Cockcroft-Gault) 54.3 





 


Glucose Level


 148 mg/dL


(70-99)


 


Calcium Level


 8.7 mg/dL


(8.5-10.1)











Images


Images


CT Neck (8/13/2020):   1.  There is enlarged node versus mass along the 

posterior aspect of the   


 superficial left parotid gland. Imaging cannot reliably distinguish benign  


 versus malignant etiologies. There is also inflammatory change associated   


 with the left parotid gland suggestive of parotiditis. There are some   


 small nodes or nodules of the superficial right parotid gland.  


 2. There is degenerative disc disease and spondylosis of the cervical   


 spine greatest at C5-6 and C6-7 at which there is spinal stenosis greatest  


 at C5-6. Uncovertebral degenerative change contributes to neural foramina   


 compromise greatest bilaterally at C5-6.  


 3. There are bilateral thyroid nodules.





Assessment/Plan


Assessment/Plan


75 year old female with left parotid sialoadenitis that was refractory to oral 

antibiotics.   Patient was admitted last night for IV antibiotics.  Minimal 

improvement thus far but less than 24 hours since admission.   CT imaging done 

yesterday did not show any abscess or other etiology that would require surgery 

at this time.  





-  Discussed sialogogues with patient and family member.  Family member to go to

store and obtain sour candies.  


-  Encouraged adequate hydration.  


-  Ceftriaxone unlikely to be adequate alone due to less coverage of Gram 

positive organisms and oral anaerobes.  Although, adequate gram negative 

coverage seen.  Consider changing regimen to cefuroxime 1.5gm every 8 hours plus

metronizadole 500mg IV q 8 hours since patient has allergy to ampicillin. 





-  Also consider starting solumedrol IV to help relieve swelling-- although this

addition will not allow us to monitor wbc





-  If not clinically improved, we can consider repeat CT imaging on Sunday to 

evaluate for abscess development.











KYLE HARRIS MD           Aug 14, 2020 15:41

## 2020-08-15 VITALS — SYSTOLIC BLOOD PRESSURE: 91 MMHG | DIASTOLIC BLOOD PRESSURE: 59 MMHG

## 2020-08-15 VITALS — DIASTOLIC BLOOD PRESSURE: 49 MMHG | SYSTOLIC BLOOD PRESSURE: 101 MMHG

## 2020-08-15 VITALS — DIASTOLIC BLOOD PRESSURE: 53 MMHG | SYSTOLIC BLOOD PRESSURE: 120 MMHG

## 2020-08-15 VITALS — DIASTOLIC BLOOD PRESSURE: 45 MMHG | SYSTOLIC BLOOD PRESSURE: 104 MMHG

## 2020-08-15 VITALS — DIASTOLIC BLOOD PRESSURE: 65 MMHG | SYSTOLIC BLOOD PRESSURE: 112 MMHG

## 2020-08-15 VITALS — SYSTOLIC BLOOD PRESSURE: 131 MMHG | DIASTOLIC BLOOD PRESSURE: 67 MMHG

## 2020-08-15 VITALS — SYSTOLIC BLOOD PRESSURE: 122 MMHG | DIASTOLIC BLOOD PRESSURE: 55 MMHG

## 2020-08-15 LAB
BASOPHILS # BLD AUTO: 0.1 X10^3/UL (ref 0–0.2)
BASOPHILS NFR BLD: 1 % (ref 0–3)
EOSINOPHIL NFR BLD: 0.1 X10^3/UL (ref 0–0.7)
EOSINOPHIL NFR BLD: 2 % (ref 0–3)
ERYTHROCYTE [DISTWIDTH] IN BLOOD BY AUTOMATED COUNT: 13 % (ref 11.5–14.5)
HCT VFR BLD CALC: 32 % (ref 36–47)
HGB BLD-MCNC: 10.6 G/DL (ref 12–15.5)
LYMPHOCYTES # BLD: 2.1 X10^3/UL (ref 1–4.8)
LYMPHOCYTES NFR BLD AUTO: 26 % (ref 24–48)
MCH RBC QN AUTO: 31 PG (ref 25–35)
MCHC RBC AUTO-ENTMCNC: 33 G/DL (ref 31–37)
MCV RBC AUTO: 93 FL (ref 79–100)
MONO #: 1 X10^3/UL (ref 0–1.1)
MONOCYTES NFR BLD: 12 % (ref 0–9)
NEUT #: 4.7 X10^3/UL (ref 1.8–7.7)
NEUTROPHILS NFR BLD AUTO: 59 % (ref 31–73)
PLATELET # BLD AUTO: 154 X10^3/UL (ref 140–400)
RBC # BLD AUTO: 3.44 X10^6/UL (ref 3.5–5.4)
WBC # BLD AUTO: 7.9 X10^3/UL (ref 4–11)

## 2020-08-15 RX ADMIN — GABAPENTIN SCH MG: 300 CAPSULE ORAL at 21:37

## 2020-08-15 RX ADMIN — CARVEDILOL SCH MG: 12.5 TABLET, FILM COATED ORAL at 17:11

## 2020-08-15 RX ADMIN — CYANOCOBALAMIN TAB 1000 MCG SCH MCG: 1000 TAB at 11:18

## 2020-08-15 RX ADMIN — LOSARTAN POTASSIUM SCH MG: 50 TABLET ORAL at 08:39

## 2020-08-15 RX ADMIN — TRAZODONE HYDROCHLORIDE SCH MG: 100 TABLET ORAL at 21:38

## 2020-08-15 RX ADMIN — CARVEDILOL SCH MG: 12.5 TABLET, FILM COATED ORAL at 08:13

## 2020-08-15 RX ADMIN — ASPIRIN SCH MG: 325 TABLET, DELAYED RELEASE ORAL at 08:07

## 2020-08-15 RX ADMIN — Medication SCH CAP: at 08:06

## 2020-08-15 RX ADMIN — PANTOPRAZOLE SODIUM SCH MG: 40 TABLET, DELAYED RELEASE ORAL at 08:06

## 2020-08-15 RX ADMIN — CITALOPRAM HYDROBROMIDE SCH MG: 10 TABLET ORAL at 08:37

## 2020-08-15 RX ADMIN — FUROSEMIDE SCH MG: 20 TABLET ORAL at 08:38

## 2020-08-15 RX ADMIN — RANOLAZINE SCH MG: 500 TABLET, FILM COATED, EXTENDED RELEASE ORAL at 21:37

## 2020-08-15 RX ADMIN — ATORVASTATIN CALCIUM SCH MG: 40 TABLET, FILM COATED ORAL at 21:37

## 2020-08-15 RX ADMIN — SENNOSIDES AND DOCUSATE SODIUM SCH TAB: 8.6; 5 TABLET ORAL at 08:37

## 2020-08-15 RX ADMIN — GABAPENTIN SCH MG: 300 CAPSULE ORAL at 08:38

## 2020-08-15 RX ADMIN — CEFTRIAXONE SCH GM: 1 INJECTION, POWDER, FOR SOLUTION INTRAMUSCULAR; INTRAVENOUS at 17:12

## 2020-08-15 RX ADMIN — CLOPIDOGREL BISULFATE SCH MG: 75 TABLET ORAL at 08:35

## 2020-08-15 RX ADMIN — CLINDAMYCIN PHOSPHATE SCH MLS/HR: 18 INJECTION, SOLUTION INTRAVENOUS at 21:36

## 2020-08-15 RX ADMIN — RANOLAZINE SCH MG: 500 TABLET, FILM COATED, EXTENDED RELEASE ORAL at 08:40

## 2020-08-15 RX ADMIN — CLINDAMYCIN PHOSPHATE SCH MLS/HR: 18 INJECTION, SOLUTION INTRAVENOUS at 11:19

## 2020-08-15 RX ADMIN — METHOCARBAMOL SCH MG: 500 TABLET ORAL at 08:38

## 2020-08-15 RX ADMIN — GABAPENTIN SCH MG: 300 CAPSULE ORAL at 14:28

## 2020-08-15 RX ADMIN — HYDROCODONE BITARTRATE AND ACETAMINOPHEN PRN TAB: 5; 325 TABLET ORAL at 08:35

## 2020-08-15 RX ADMIN — METHYLPREDNISOLONE SODIUM SUCCINATE SCH MG: 125 INJECTION, POWDER, FOR SOLUTION INTRAMUSCULAR; INTRAVENOUS at 21:38

## 2020-08-15 RX ADMIN — VITAMIN D, TAB 1000IU (100/BT) SCH UNIT: 25 TAB at 08:36

## 2020-08-15 RX ADMIN — METHYLPREDNISOLONE SODIUM SUCCINATE SCH MG: 125 INJECTION, POWDER, FOR SOLUTION INTRAMUSCULAR; INTRAVENOUS at 11:19

## 2020-08-15 RX ADMIN — Medication SCH CAP: at 21:38

## 2020-08-15 NOTE — PDOC
PROGRESS NOTES


Date of Service:


DATE: 8/15/20 


TIME: 10:29





Chief Complaint


Chief Complaint





ASSESSMENT AND PLAN: 





Parotid swelling, infection.  


morbid obesity








admitted.  





consult Perla Castillo MD, ENT 


p.r.n. morphine, IV


ceftriaxone 1 gram every day.  


Home meds, 


DVT prophylaxis.  


Full code.


continued ceftriaxone and added clindamycin to regimen to cover anaerobes and 

aerobes


 








D/W RN





History of Present Illness


History of Present Illness


pleasant 75-year-old female who saw Dr. Perla Castillo of the ENT service 

today.  She was told to go to


the ER.  We are going to admit the patient with infection to her parotid 

gland.//consulting Dr. Perla Castillo





Vitals


Vitals





Vital Signs








  Date Time  Temp Pulse Resp B/P (MAP) Pulse Ox O2 Delivery O2 Flow Rate FiO2


 


8/15/20 08:40  72  131/67    


 


8/15/20 08:35     95 Nasal Cannula 2.0 


 


8/15/20 07:28 99.6  18     





 99.6       











Physical Exam


Physical Exam


HEENT:  The left PAROTID  is swollen., TENDERNESS SLOW TO RESOLVE 


EYES:  Extraocular muscles are intact, pupils are equally round and reactive to


light and accommodation


MUSCULOSKELETAL:  Well developed, well nourished, good range of motion


ENDOCRINE:  No thyromegaly was palpated


LYMPHATICS:  No cervical chain or axillary nodes were noted


HEMATOPOIETIC:  No bruising


NECK:  Supple, no JVD, no thyromegaly was noted.


LUNGS:  Clear to auscultation in all lung fields without rhonchi or wheezing.


HEART:  RRR, S1, S2 present.  Peripheral pulses intact, no obvious murmurs were


noted.


ABDOMEN:  Soft, nontender.  Positive bowel sounds no organomegaly, normal bowel


sounds.


EXTREMITIES:  Without any cyanosis, clubbing, or edema.  Pedal pulses intact,


Homans sign is negative.


NEUROLOGIC:  Normal speech, normal tone.  A & O x3, moves all extremities, no


obvious focal deficits.


PSYCHIATRIC:  Normal affect, normal mood.  Stable.


SKIN:  No ulcerations or rashes, good skin turgor, no jaundice.


VASCULAR:  Good capillary refill, neurovascular bundle appears to be intact.


General:  Alert, Oriented X3, Cooperative, No acute distress


Heart:  Regular rate, Normal S1


Lungs:  Clear


Abdomen:  Normal bowel sounds, Soft, No tenderness


Extremities:  No clubbing, No cyanosis, No edema


Skin:  No rashes





Assessment and Plan


Assessmemt and Plan


Problems


Medical Problems:


(1) Parotitis not due to mumps


Status: Acute  











Comment


Review of Relevant


I have reviewed the following items irma (where applicable) has been applied.


Labs





Laboratory Tests








Test


 8/13/20


16:55


 


White Blood Count


 9.4 x10^3/uL


(4.0-11.0)


 


Red Blood Count


 4.01 x10^6/uL


(3.50-5.40)


 


Hemoglobin


 12.5 g/dL


(12.0-15.5)


 


Hematocrit


 36.9 %


(36.0-47.0)


 


Mean Corpuscular Volume 92 fL () 


 


Mean Corpuscular Hemoglobin 31 pg (25-35) 


 


Mean Corpuscular Hemoglobin


Concent 34 g/dL


(31-37)


 


Red Cell Distribution Width


 13.0 %


(11.5-14.5)


 


Platelet Count


 203 x10^3/uL


(140-400)


 


Neutrophils (%) (Auto) 60 % (31-73) 


 


Lymphocytes (%) (Auto) 26 % (24-48) 


 


Monocytes (%) (Auto) 12 % (0-9) 


 


Eosinophils (%) (Auto) 2 % (0-3) 


 


Basophils (%) (Auto) 1 % (0-3) 


 


Neutrophils # (Auto)


 5.6 x10^3/uL


(1.8-7.7)


 


Lymphocytes # (Auto)


 2.4 x10^3/uL


(1.0-4.8)


 


Monocytes # (Auto)


 1.1 x10^3/uL


(0.0-1.1)


 


Eosinophils # (Auto)


 0.2 x10^3/uL


(0.0-0.7)


 


Basophils # (Auto)


 0.1 x10^3/uL


(0.0-0.2)


 


Sodium Level


 141 mmol/L


(136-145)


 


Potassium Level


 3.9 mmol/L


(3.5-5.1)


 


Chloride Level


 99 mmol/L


()


 


Carbon Dioxide Level


 35 mmol/L


(21-32)


 


Anion Gap 7 (6-14) 


 


Blood Urea Nitrogen


 16 mg/dL


(7-20)


 


Creatinine


 1.0 mg/dL


(0.6-1.0)


 


Estimated GFR


(Cockcroft-Gault) 54.3 





 


Glucose Level


 148 mg/dL


(70-99)


 


Calcium Level


 8.7 mg/dL


(8.5-10.1)








Medications





Current Medications


Ceftriaxone Sodium (Rocephin) 1 gm 1X  ONCE IVP  Last administered on 8/13/20at 

17:26;  Start 8/13/20 at 17:00;  Stop 8/13/20 at 17:01;  Status DC


Morphine Sulfate (Morphine Sulfate) 2 mg PRN Q2HR  PRN IV PAIN Last administered

on 8/14/20at 12:55;  Start 8/13/20 at 19:30;  Stop 8/15/20 at 05:56;  Status DC


Ceftriaxone Sodium (Rocephin) 1 gm Q24H IVP  Last administered on 8/14/20at 

17:07;  Start 8/14/20 at 17:00


Aspirin (Ecotrin) 325 mg DAILYWBKFT PO  Last administered on 8/15/20at 08:07;  

Start 8/14/20 at 08:00


Carvedilol (Coreg) 6.25 mg BIDWMEALS PO  Last administered on 8/14/20at 17:07;  

Start 8/14/20 at 08:00;  Stop 8/14/20 at 17:18;  Status DC


Vitamin D (Vitamin D3) 2,000 unit DAILY PO  Last administered on 8/15/20at 

08:36;  Start 8/14/20 at 09:00


Citalopram Hydrobromide (CeleXA) 10 mg DAILY PO  Last administered on 8/15/20at 

08:37;  Start 8/14/20 at 09:00


Clopidogrel Bisulfate (Plavix) 75 mg DAILYWBKFT PO  Last administered on 

8/15/20at 08:35;  Start 8/14/20 at 08:00


Cyanocobalamin (Vitamin B-12) 5,000 mcg DAILY PO  Last administered on 8/14/20at

09:02;  Start 8/14/20 at 09:00


Diphenhydramine HCl (Benadryl) 25 mg PRN DAILY  PRN PO ALLERGIES;  Start 8/14/20

at 05:45


Fluticasone Propionate (Flonase) 2 spray PRN DAILY  PRN NS CONGESTION;  Start 

8/14/20 at 05:45


Furosemide (Lasix) 20 mg DAILY PO  Last administered on 8/15/20at 08:38;  Start 

8/14/20 at 09:00


Ipratropium Bromide (Atrovent) 0.2 mg Q8HRS IH ;  Start 8/14/20 at 06:00;  Stop 

8/14/20 at 21:54;  Status DC


Methocarbamol (Robaxin) 500 mg DAILY PO  Last administered on 8/15/20at 08:38;  

Start 8/14/20 at 09:00


Nitroglycerin (Nitrostat) 0.4 mg PRN Q5MIN  PRN SL CHEST PAIN;  Start 8/14/20 at

05:45


Ranolazine (Ranexa) 500 mg BID PO  Last administered on 8/15/20at 08:40;  Start 

8/14/20 at 09:00


Senna/Docusate Sodium (Senna Plus) 1 tab DAILY PO  Last administered on 

8/15/20at 08:37;  Start 8/14/20 at 09:00


Trazodone HCl (Desyrel) 100 mg QHS PO  Last administered on 8/14/20at 22:53;  

Start 8/14/20 at 21:00


Atorvastatin Calcium (Lipitor) 80 mg QHS PO  Last administered on 8/14/20at 

22:53;  Start 8/14/20 at 21:00


Gabapentin (Neurontin) 600 mg TID PO  Last administered on 8/15/20at 08:38;  

Start 8/14/20 at 09:00


Losartan Potassium (Cozaar) 100 mg DAILY PO  Last administered on 8/15/20at 

08:39;  Start 8/14/20 at 09:00


Pantoprazole Sodium (Protonix) 40 mg DAILYAC PO  Last administered on 8/15/20at 

08:06;  Start 8/14/20 at 07:30


Non-Formulary Medication (Rosuvastatin Calcium (Crestor)) 1 tab DAILY PO ;  

Start 8/14/20 at 09:00;  Status UNV


Acetaminophen/ Hydrocodone Bitart (Lortab 5/325) 1 tab PRN Q4HRS  PRN PO 

MODERATE PAIN 4-6 Last administered on 8/15/20at 08:35;  Start 8/14/20 at 05:45


Lactobacillus Rhamnosus (Culturelle) 1 cap BID PO  Last administered on 

8/15/20at 08:06;  Start 8/14/20 at 21:00


Hydralazine HCl (Apresoline Inj) 10 mg PRN Q4HRS  PRN IVP ELEVATED BP, SEE 

COMMENTS;  Start 8/14/20 at 17:30


Carvedilol (Coreg) 12.5 mg BIDWMEALS PO  Last administered on 8/15/20at 08:13;  

Start 8/14/20 at 17:30


Ipratropium Bromide (Atrovent) 0.2 mg PRN Q8HRS  PRN IH SHORTNESS OF BREATH;  

Start 8/14/20 at 22:00


Metronidazole 100 ml @  100 mls/hr Q8HRS IV ;  Start 8/15/20 at 10:00;  Status 

Cancel


Methylprednisolone Sodium Succinate (SOLU-Medrol 125MG VIAL) 125 mg Q8HRS IV ;  

Start 8/15/20 at 10:00


Clindamycin Phosphate 50 ml @  100 mls/hr Q8HRS IV ;  Start 8/15/20 at 10:30





Active Scripts


Active


Aspirin Ec (Aspirin) 325 Mg Tablet.dr 325 Mg PO DAILYWBKFT


Clopidogrel (Clopidogrel Bisulfate) 75 Mg Tablet 75 Mg PO DAILYWBKFT


Reported


Stool Softener Tablet (Sennosides/Docusate Sodium) 1 Each Tablet 1 Each PO DAILY


NITROGLYCERIN SubLingual (Nitroglycerin) 0.4 Mg Tab.subl 0.4 Mg SL PRN Q5MIN PRN


Benadryl (Diphenhydramine Hcl) 25 Mg Capsule 1 Cap PO DAILY PRN 30 Days


Fluticasone Propionate Nasal Spray (Fluticasone Propionate) 16 Gm Spray.susp 2 

Spray NS DAILY PRN


Crestor (Rosuvastatin Calcium) 40 Mg Tablet 1 Tab PO DAILY


Ranexa (Ranolazine) 500 Mg Tab.er.12h 1 Tab PO BID 30 Days


Robaxin-750 (Methocarbamol) 750 Mg Tablet 500 Mg PO DAILY 30 Days


Hydrocodone-Acetamin 5-325 mg (Hydrocodone/Acetaminophen) 1 Each Tablet 1 Each 

PO Q4HRS PRN


Furosemide 20 Mg Tablet 1 Tab PO DAILY


Citalopram Hbr (Citalopram Hydrobromide) 10 Mg Tablet 10 Mg PO DAILY


Losartan Potassium 100 Mg Tablet 100 Mg PO DAILY


Ipratropium Bromide 0.2 Mg/1 Ml Solution 0.2 Mg IH Q8HRS


Spiriva (Tiotropium Bromide) 18 Mcg Cap.w.dev 1 Cap IH DAILY


Carvedilol ** (Carvedilol) 6.25 Mg Tablet 1 Tab PO BID


Vitamin D3 (Cholecalciferol (Vitamin D3)) 1,000 Unit Tablet 2,000 Unit PO DAILY


Vitamin B-12 (Cyanocobalamin (Vitamin B-12)) 1,000 Mcg Tablet 5,000 Mcg PO DAILY


Omeprazole 40 Mg Capsule.dr 40 Mg PO DAILY


Atorvastatin Calcium 80 Mg Tablet 80 Mg PO HS


Trazodone Hcl 100 Mg Tablet 1 Tab PO QHS


Gabapentin 600 Mg Tablet 600 Mg PO TID


Vitals/I & O





Vital Sign - Last 24 Hours








 8/14/20 8/14/20 8/14/20 8/14/20





 11:24 12:55 13:42 15:00


 


Temp 99.1   98.6





 99.1   98.6


 


Pulse 77   74


 


Resp 16 16 16 18


 


B/P (MAP) 124/60 (81)   123/72 (89)


 


Pulse Ox 97   94


 


O2 Delivery Nasal Cannula   Nasal Cannula


 


O2 Flow Rate 2.0   2.0


 


    





    





 8/14/20 8/14/20 8/14/20 8/14/20





 17:07 17:09 17:43 18:28


 


Pulse 89 89 89 99


 


Resp    16


 


B/P (MAP) 196/82 196/82 (120) 196/82 122/55 (77)


 


Pulse Ox  92  98


 


O2 Delivery  Nasal Cannula  Nasal Cannula


 


O2 Flow Rate  3.0  2.0





 8/14/20 8/14/20 8/14/20 8/14/20





 19:00 20:00 23:00 23:17


 


Temp 99.6  96.8 





 99.6  96.8 


 


Pulse 65  75 


 


Resp 14 18 16


 


B/P (MAP) 135/56 (82)  101/49 (66) 


 


Pulse Ox 95  98 95


 


O2 Delivery Nasal Cannula Nasal Cannula  Nasal Cannula


 


O2 Flow Rate 2.0 2.0  2.0


 


    





    





 8/15/20 8/15/20 8/15/20 8/15/20





 00:17 03:00 07:28 08:13


 


Temp  98.5 99.6 





  98.5 99.6 


 


Pulse  63 72 72


 


Resp 18 18 18 


 


B/P (MAP)  91/59 (70) 131/67 (88) 131/67


 


Pulse Ox 95 98 95 


 


O2 Delivery Nasal Cannula  Nasal Cannula 


 


O2 Flow Rate 2.0  2.0 


 


    





    





 8/15/20 8/15/20 8/15/20 





 08:35 08:39 08:40 


 


Pulse  72 72 


 


B/P (MAP)  131/67 131/67 


 


Pulse Ox 95   


 


O2 Delivery Nasal Cannula   


 


O2 Flow Rate 2.0   














Intake and Output   


 


 8/14/20 8/14/20 8/15/20





 15:00 23:00 07:00


 


Intake Total 760 ml 240 ml 


 


Output Total   0 ml


 


Balance 760 ml 240 ml 0 ml











Justicifation of Admission Dx:


Justifications for Admission:


Justification of Admission Dx:  Yes











ELISHA SCOTT MD          Aug 15, 2020 10:29

## 2020-08-15 NOTE — PDOC
Date of Service:


DATE: 8/15/20 


TIME: 09:17





Subjective:


Subjective:


75 year old female admitted for left parotid sialoadenitis.  Lymph node 

enlargement seen on CT imaging.   Patient reports still having significant pain 

and discomfort.   No significant improvement since hospital admission-- symptoms

started 1 week ago.   Patient has been treated with 48 hours of ceftriaxone 

without improvement.





Objective:


Vital Signs:





Vital Signs








  Date Time  Temp Pulse Resp B/P (MAP) Pulse Ox O2 Delivery O2 Flow Rate FiO2


 


8/15/20 08:40  72  131/67    


 


8/15/20 08:35     95 Nasal Cannula 2.0 


 


8/15/20 07:28 99.6  18     





 99.6       








Labs:


none today





Physical Exam:


Physical Exam:


GEN:  NAD, AAO X 3


HEENT:  OP clear, dry Mitali's duct on left;  Continued swelling and induration

of left parotid gland (diffuse)


CV:  S1S2 without murmurs, rubs, or gallops


RESP:  CTAB without wheezing, rhonchi, or crackles


EXT:  No edema


NEURO:  AAO x 3, CN 3-12 intact





Assessment & Plan:


Assessment :


75 year old female with left parotid sialoadenitis that was refractory to oral 

antibiotics.   Patient was admitted on 8/13 for IV antibiotics.  Minimal 

improvement thus far with 48 hours of ceftriaxone   CT imaging done 8/13 at 

[facility] did not show any abscess or other etiology that would require 

surgery at this time.


Plan:





-  Discussed sialogogues with patient and family member.  Patient to continue to

use  


-  Encouraged adequate hydration.  


-  continue warm compresses to area 


-  Ceftriaxone unlikely to be adequate alone due to less coverage of Gram 

positive organisms and oral anaerobes.  Although, adequate gram negative 

coverage seen.  No significant improvement after 48 hours.  Plan to change today

to cefuroxime 1.5gm every 8 hours plus metronizadole 500mg IV q 8 hours since 

patient has allergy to ampicillin. 





-  Also plan to starting solumedrol IV to help relieve swelling-- although this 

addition will not allow us to monitor wbc





-  Will check CBC today prior to starting solumedrol.  





-  If not clinically improved, we can consider repeat CT imaging on 

Sunday(tomorrow) to evaluate for abscess development





Justicifation of Admission Dx:


Justifications for Admission:


Justification of Admission Dx:  Yes











KYLE HARRIS MD           Aug 15, 2020 09:22

## 2020-08-16 VITALS — SYSTOLIC BLOOD PRESSURE: 150 MMHG | DIASTOLIC BLOOD PRESSURE: 74 MMHG

## 2020-08-16 VITALS — DIASTOLIC BLOOD PRESSURE: 63 MMHG | SYSTOLIC BLOOD PRESSURE: 143 MMHG

## 2020-08-16 VITALS — SYSTOLIC BLOOD PRESSURE: 150 MMHG | DIASTOLIC BLOOD PRESSURE: 65 MMHG

## 2020-08-16 VITALS — SYSTOLIC BLOOD PRESSURE: 129 MMHG | DIASTOLIC BLOOD PRESSURE: 50 MMHG

## 2020-08-16 VITALS — DIASTOLIC BLOOD PRESSURE: 59 MMHG | SYSTOLIC BLOOD PRESSURE: 142 MMHG

## 2020-08-16 VITALS — SYSTOLIC BLOOD PRESSURE: 106 MMHG | DIASTOLIC BLOOD PRESSURE: 76 MMHG

## 2020-08-16 LAB
% LYMPHS: 27 % (ref 24–48)
% MONOS: 8 % (ref 0–10)
% SEGS: 61 % (ref 35–66)
ALBUMIN SERPL-MCNC: 2.8 G/DL (ref 3.4–5)
ALBUMIN/GLOB SERPL: 0.6 {RATIO} (ref 1–1.7)
ALP SERPL-CCNC: 69 U/L (ref 46–116)
ALT SERPL-CCNC: 11 U/L (ref 14–59)
ANION GAP SERPL CALC-SCNC: 2 MMOL/L (ref 6–14)
AST SERPL-CCNC: 11 U/L (ref 15–37)
BASOPHILS # BLD AUTO: 0 X10^3/UL (ref 0–0.2)
BASOPHILS NFR BLD: 0 % (ref 0–3)
BILIRUB SERPL-MCNC: 0.2 MG/DL (ref 0.2–1)
BUN SERPL-MCNC: 24 MG/DL (ref 7–20)
BUN/CREAT SERPL: 20 (ref 6–20)
CALCIUM SERPL-MCNC: 8.9 MG/DL (ref 8.5–10.1)
CHLORIDE SERPL-SCNC: 100 MMOL/L (ref 98–107)
CO2 SERPL-SCNC: 37 MMOL/L (ref 21–32)
CREAT SERPL-MCNC: 1.2 MG/DL (ref 0.6–1)
EOSINOPHIL NFR BLD AUTO: 4 % (ref 0–5)
EOSINOPHIL NFR BLD: 0 % (ref 0–3)
EOSINOPHIL NFR BLD: 0 X10^3/UL (ref 0–0.7)
ERYTHROCYTE [DISTWIDTH] IN BLOOD BY AUTOMATED COUNT: 12.6 % (ref 11.5–14.5)
GFR SERPLBLD BASED ON 1.73 SQ M-ARVRAT: 44 ML/MIN
GLOBULIN SER-MCNC: 4.5 G/DL (ref 2.2–3.8)
GLUCOSE SERPL-MCNC: 240 MG/DL (ref 70–99)
HCT VFR BLD CALC: 34.4 % (ref 36–47)
HGB BLD-MCNC: 11.6 G/DL (ref 12–15.5)
LYMPHOCYTES # BLD: 1.1 X10^3/UL (ref 1–4.8)
LYMPHOCYTES NFR BLD AUTO: 11 % (ref 24–48)
MCH RBC QN AUTO: 31 PG (ref 25–35)
MCHC RBC AUTO-ENTMCNC: 34 G/DL (ref 31–37)
MCV RBC AUTO: 92 FL (ref 79–100)
MONO #: 0.3 X10^3/UL (ref 0–1.1)
MONOCYTES NFR BLD: 3 % (ref 0–9)
NEUT #: 8.7 X10^3/UL (ref 1.8–7.7)
NEUTROPHILS NFR BLD AUTO: 86 % (ref 31–73)
PLATELET # BLD AUTO: 168 X10^3/UL (ref 140–400)
PLATELET # BLD EST: ADEQUATE 10*3/UL
POTASSIUM SERPL-SCNC: 4 MMOL/L (ref 3.5–5.1)
PROT SERPL-MCNC: 7.3 G/DL (ref 6.4–8.2)
RBC # BLD AUTO: 3.74 X10^6/UL (ref 3.5–5.4)
SODIUM SERPL-SCNC: 139 MMOL/L (ref 136–145)
WBC # BLD AUTO: 10.1 X10^3/UL (ref 4–11)

## 2020-08-16 RX ADMIN — TRAZODONE HYDROCHLORIDE SCH MG: 100 TABLET ORAL at 20:40

## 2020-08-16 RX ADMIN — GABAPENTIN SCH MG: 300 CAPSULE ORAL at 14:22

## 2020-08-16 RX ADMIN — RANOLAZINE SCH MG: 500 TABLET, FILM COATED, EXTENDED RELEASE ORAL at 12:06

## 2020-08-16 RX ADMIN — CLINDAMYCIN PHOSPHATE SCH MLS/HR: 18 INJECTION, SOLUTION INTRAVENOUS at 06:19

## 2020-08-16 RX ADMIN — GABAPENTIN SCH MG: 300 CAPSULE ORAL at 08:32

## 2020-08-16 RX ADMIN — FUROSEMIDE SCH MG: 20 TABLET ORAL at 08:33

## 2020-08-16 RX ADMIN — Medication SCH CAP: at 20:40

## 2020-08-16 RX ADMIN — VITAMIN D, TAB 1000IU (100/BT) SCH UNIT: 25 TAB at 08:32

## 2020-08-16 RX ADMIN — CLINDAMYCIN PHOSPHATE SCH MLS/HR: 18 INJECTION, SOLUTION INTRAVENOUS at 14:22

## 2020-08-16 RX ADMIN — METHYLPREDNISOLONE SODIUM SUCCINATE SCH MG: 125 INJECTION, POWDER, FOR SOLUTION INTRAMUSCULAR; INTRAVENOUS at 14:22

## 2020-08-16 RX ADMIN — CLOPIDOGREL BISULFATE SCH MG: 75 TABLET ORAL at 08:31

## 2020-08-16 RX ADMIN — ASPIRIN SCH MG: 325 TABLET, DELAYED RELEASE ORAL at 08:31

## 2020-08-16 RX ADMIN — CEFTRIAXONE SCH GM: 1 INJECTION, POWDER, FOR SOLUTION INTRAMUSCULAR; INTRAVENOUS at 17:15

## 2020-08-16 RX ADMIN — CLINDAMYCIN PHOSPHATE SCH MLS/HR: 18 INJECTION, SOLUTION INTRAVENOUS at 20:40

## 2020-08-16 RX ADMIN — ATORVASTATIN CALCIUM SCH MG: 40 TABLET, FILM COATED ORAL at 20:40

## 2020-08-16 RX ADMIN — SENNOSIDES AND DOCUSATE SODIUM SCH TAB: 8.6; 5 TABLET ORAL at 08:32

## 2020-08-16 RX ADMIN — RANOLAZINE SCH MG: 500 TABLET, FILM COATED, EXTENDED RELEASE ORAL at 20:41

## 2020-08-16 RX ADMIN — Medication SCH CAP: at 08:32

## 2020-08-16 RX ADMIN — METHYLPREDNISOLONE SODIUM SUCCINATE SCH MG: 125 INJECTION, POWDER, FOR SOLUTION INTRAMUSCULAR; INTRAVENOUS at 20:41

## 2020-08-16 RX ADMIN — HYDROCODONE BITARTRATE AND ACETAMINOPHEN PRN TAB: 5; 325 TABLET ORAL at 06:18

## 2020-08-16 RX ADMIN — HYDROCODONE BITARTRATE AND ACETAMINOPHEN PRN TAB: 5; 325 TABLET ORAL at 11:20

## 2020-08-16 RX ADMIN — CYANOCOBALAMIN TAB 1000 MCG SCH MCG: 1000 TAB at 11:20

## 2020-08-16 RX ADMIN — METHYLPREDNISOLONE SODIUM SUCCINATE SCH MG: 125 INJECTION, POWDER, FOR SOLUTION INTRAMUSCULAR; INTRAVENOUS at 06:18

## 2020-08-16 RX ADMIN — GABAPENTIN SCH MG: 300 CAPSULE ORAL at 20:41

## 2020-08-16 RX ADMIN — PANTOPRAZOLE SODIUM SCH MG: 40 TABLET, DELAYED RELEASE ORAL at 08:31

## 2020-08-16 RX ADMIN — LOSARTAN POTASSIUM SCH MG: 50 TABLET ORAL at 08:33

## 2020-08-16 RX ADMIN — CITALOPRAM HYDROBROMIDE SCH MG: 10 TABLET ORAL at 08:32

## 2020-08-16 RX ADMIN — CARVEDILOL SCH MG: 12.5 TABLET, FILM COATED ORAL at 17:15

## 2020-08-16 RX ADMIN — CARVEDILOL SCH MG: 12.5 TABLET, FILM COATED ORAL at 08:32

## 2020-08-16 RX ADMIN — METHOCARBAMOL SCH MG: 500 TABLET ORAL at 08:32

## 2020-08-16 NOTE — PDOC
PROGRESS NOTES


Date of Service:


DATE: 8/16/20 


TIME: 10:56





Chief Complaint


Chief Complaint





ASSESSMENT AND PLAN: 





Parotid swelling, infection.  


morbid obesity








admitted.  





consult Perla Castillo MD, ENT 


p.r.n. morphine, IV


ceftriaxone 1 gram every day.  


Home meds, 


DVT prophylaxis.  


Full code.


continued ceftriaxone and added clindamycin to regimen to cover anaerobes and 

aerobes





Consider hospital discharge tomorrow or Tuesday with oral antibiotics.





 








D/W RN





History of Present Illness


History of Present Illness


pleasant 75-year-old female who saw Dr. Perla Castillo of the ENT service 

today.  She was told to go to


the ER.  We are going to admit the patient with infection to her parotid 

gland.//consulting Dr. Perla Castillo





Vitals


Vitals





Vital Signs








  Date Time  Temp Pulse Resp B/P (MAP) Pulse Ox O2 Delivery O2 Flow Rate FiO2


 


8/16/20 08:33  71  150/65    


 


8/16/20 08:30      Room Air  


 


8/16/20 07:00 97.5  12  100   





 97.5       


 


8/15/20 22:43       2.0 











Physical Exam


Physical Exam


HEENT:  The left PAROTID  is swollen., TENDERNESS SLOW TO RESOLVE , BUT 

IMPROVING 


EYES:  Extraocular muscles are intact, pupils are equally round and reactive to


light and accommodation


MUSCULOSKELETAL:  Well developed, well nourished, good range of motion


ENDOCRINE:  No thyromegaly was palpated


LYMPHATICS:  No cervical chain or axillary nodes were noted


HEMATOPOIETIC:  No bruising


NECK:  Supple, no JVD, no thyromegaly was noted.


LUNGS:  Clear to auscultation in all lung fields without rhonchi or wheezing.


HEART:  RRR, S1, S2 present.  Peripheral pulses intact, no obvious murmurs were


noted.


ABDOMEN:  Soft, nontender.  Positive bowel sounds no organomegaly, normal bowel


sounds.


EXTREMITIES:  Without any cyanosis, clubbing, or edema.  Pedal pulses intact,


Homans sign is negative.


NEUROLOGIC:  Normal speech, normal tone.  A & O x3, moves all extremities, no


obvious focal deficits.


PSYCHIATRIC:  Normal affect, normal mood.  Stable.


SKIN:  No ulcerations or rashes, good skin turgor, no jaundice.


VASCULAR:  Good capillary refill, neurovascular bundle appears to be intact.


General:  Alert, Oriented X3, Cooperative, No acute distress


Heart:  Regular rate, Normal S1


Lungs:  Clear


Abdomen:  Normal bowel sounds, Soft, No tenderness


Extremities:  No clubbing, No cyanosis, No edema


Skin:  No rashes





Labs


LABS





Laboratory Tests








Test


 8/16/20


06:25


 


White Blood Count


 10.1 x10^3/uL


(4.0-11.0)


 


Red Blood Count


 3.74 x10^6/uL


(3.50-5.40)


 


Hemoglobin


 11.6 g/dL


(12.0-15.5)


 


Hematocrit


 34.4 %


(36.0-47.0)


 


Mean Corpuscular Volume 92 fL () 


 


Mean Corpuscular Hemoglobin 31 pg (25-35) 


 


Mean Corpuscular Hemoglobin


Concent 34 g/dL


(31-37)


 


Red Cell Distribution Width


 12.6 %


(11.5-14.5)


 


Platelet Count


 168 x10^3/uL


(140-400)


 


Neutrophils (%) (Auto) 86 % (31-73) 


 


Lymphocytes (%) (Auto) 11 % (24-48) 


 


Monocytes (%) (Auto) 3 % (0-9) 


 


Eosinophils (%) (Auto) 0 % (0-3) 


 


Basophils (%) (Auto) 0 % (0-3) 


 


Neutrophils # (Auto)


 8.7 x10^3/uL


(1.8-7.7)


 


Lymphocytes # (Auto)


 1.1 x10^3/uL


(1.0-4.8)


 


Monocytes # (Auto)


 0.3 x10^3/uL


(0.0-1.1)


 


Eosinophils # (Auto)


 0.0 x10^3/uL


(0.0-0.7)


 


Basophils # (Auto)


 0.0 x10^3/uL


(0.0-0.2)


 


Sodium Level


 139 mmol/L


(136-145)


 


Potassium Level


 4.0 mmol/L


(3.5-5.1)


 


Chloride Level


 100 mmol/L


()


 


Carbon Dioxide Level


 37 mmol/L


(21-32)


 


Anion Gap 2 (6-14) 


 


Blood Urea Nitrogen


 24 mg/dL


(7-20)


 


Creatinine


 1.2 mg/dL


(0.6-1.0)


 


Estimated GFR


(Cockcroft-Gault) 44.0 





 


BUN/Creatinine Ratio 20 (6-20) 


 


Glucose Level


 240 mg/dL


(70-99)


 


Calcium Level


 8.9 mg/dL


(8.5-10.1)


 


Total Bilirubin


 0.2 mg/dL


(0.2-1.0)


 


Aspartate Amino Transf


(AST/SGOT) 11 U/L (15-37) 





 


Alanine Aminotransferase


(ALT/SGPT) 11 U/L (14-59) 





 


Alkaline Phosphatase


 69 U/L


()


 


Total Protein


 7.3 g/dL


(6.4-8.2)


 


Albumin


 2.8 g/dL


(3.4-5.0)


 


Albumin/Globulin Ratio 0.6 (1.0-1.7) 











Assessment and Plan


Assessmemt and Plan


Problems


Medical Problems:


(1) Parotitis not due to mumps


Status: Acute  











Comment


Review of Relevant


I have reviewed the following items irma (where applicable) has been applied.


Labs





Laboratory Tests








Test


 8/15/20


10:20 8/16/20


06:25


 


White Blood Count


 7.9 x10^3/uL


(4.0-11.0) 10.1 x10^3/uL


(4.0-11.0)


 


Red Blood Count


 3.44 x10^6/uL


(3.50-5.40) 3.74 x10^6/uL


(3.50-5.40)


 


Hemoglobin


 10.6 g/dL


(12.0-15.5) 11.6 g/dL


(12.0-15.5)


 


Hematocrit


 32.0 %


(36.0-47.0) 34.4 %


(36.0-47.0)


 


Mean Corpuscular Volume 93 fL ()  92 fL () 


 


Mean Corpuscular Hemoglobin 31 pg (25-35)  31 pg (25-35) 


 


Mean Corpuscular Hemoglobin


Concent 33 g/dL


(31-37) 34 g/dL


(31-37)


 


Red Cell Distribution Width


 13.0 %


(11.5-14.5) 12.6 %


(11.5-14.5)


 


Platelet Count


 154 x10^3/uL


(140-400) 168 x10^3/uL


(140-400)


 


Neutrophils (%) (Auto) 59 % (31-73)  86 % (31-73) 


 


Lymphocytes (%) (Auto) 26 % (24-48)  11 % (24-48) 


 


Monocytes (%) (Auto) 12 % (0-9)  3 % (0-9) 


 


Eosinophils (%) (Auto) 2 % (0-3)  0 % (0-3) 


 


Basophils (%) (Auto) 1 % (0-3)  0 % (0-3) 


 


Neutrophils # (Auto)


 4.7 x10^3/uL


(1.8-7.7) 8.7 x10^3/uL


(1.8-7.7)


 


Lymphocytes # (Auto)


 2.1 x10^3/uL


(1.0-4.8) 1.1 x10^3/uL


(1.0-4.8)


 


Monocytes # (Auto)


 1.0 x10^3/uL


(0.0-1.1) 0.3 x10^3/uL


(0.0-1.1)


 


Eosinophils # (Auto)


 0.1 x10^3/uL


(0.0-0.7) 0.0 x10^3/uL


(0.0-0.7)


 


Basophils # (Auto)


 0.1 x10^3/uL


(0.0-0.2) 0.0 x10^3/uL


(0.0-0.2)


 


Sodium Level


 


 139 mmol/L


(136-145)


 


Potassium Level


 


 4.0 mmol/L


(3.5-5.1)


 


Chloride Level


 


 100 mmol/L


()


 


Carbon Dioxide Level


 


 37 mmol/L


(21-32)


 


Anion Gap  2 (6-14) 


 


Blood Urea Nitrogen


 


 24 mg/dL


(7-20)


 


Creatinine


 


 1.2 mg/dL


(0.6-1.0)


 


Estimated GFR


(Cockcroft-Gault) 


 44.0 





 


BUN/Creatinine Ratio  20 (6-20) 


 


Glucose Level


 


 240 mg/dL


(70-99)


 


Calcium Level


 


 8.9 mg/dL


(8.5-10.1)


 


Total Bilirubin


 


 0.2 mg/dL


(0.2-1.0)


 


Aspartate Amino Transf


(AST/SGOT) 


 11 U/L (15-37) 





 


Alanine Aminotransferase


(ALT/SGPT) 


 11 U/L (14-59) 





 


Alkaline Phosphatase


 


 69 U/L


()


 


Total Protein


 


 7.3 g/dL


(6.4-8.2)


 


Albumin


 


 2.8 g/dL


(3.4-5.0)


 


Albumin/Globulin Ratio  0.6 (1.0-1.7) 








Laboratory Tests








Test


 8/16/20


06:25


 


White Blood Count


 10.1 x10^3/uL


(4.0-11.0)


 


Red Blood Count


 3.74 x10^6/uL


(3.50-5.40)


 


Hemoglobin


 11.6 g/dL


(12.0-15.5)


 


Hematocrit


 34.4 %


(36.0-47.0)


 


Mean Corpuscular Volume 92 fL () 


 


Mean Corpuscular Hemoglobin 31 pg (25-35) 


 


Mean Corpuscular Hemoglobin


Concent 34 g/dL


(31-37)


 


Red Cell Distribution Width


 12.6 %


(11.5-14.5)


 


Platelet Count


 168 x10^3/uL


(140-400)


 


Neutrophils (%) (Auto) 86 % (31-73) 


 


Lymphocytes (%) (Auto) 11 % (24-48) 


 


Monocytes (%) (Auto) 3 % (0-9) 


 


Eosinophils (%) (Auto) 0 % (0-3) 


 


Basophils (%) (Auto) 0 % (0-3) 


 


Neutrophils # (Auto)


 8.7 x10^3/uL


(1.8-7.7)


 


Lymphocytes # (Auto)


 1.1 x10^3/uL


(1.0-4.8)


 


Monocytes # (Auto)


 0.3 x10^3/uL


(0.0-1.1)


 


Eosinophils # (Auto)


 0.0 x10^3/uL


(0.0-0.7)


 


Basophils # (Auto)


 0.0 x10^3/uL


(0.0-0.2)


 


Sodium Level


 139 mmol/L


(136-145)


 


Potassium Level


 4.0 mmol/L


(3.5-5.1)


 


Chloride Level


 100 mmol/L


()


 


Carbon Dioxide Level


 37 mmol/L


(21-32)


 


Anion Gap 2 (6-14) 


 


Blood Urea Nitrogen


 24 mg/dL


(7-20)


 


Creatinine


 1.2 mg/dL


(0.6-1.0)


 


Estimated GFR


(Cockcroft-Gault) 44.0 





 


BUN/Creatinine Ratio 20 (6-20) 


 


Glucose Level


 240 mg/dL


(70-99)


 


Calcium Level


 8.9 mg/dL


(8.5-10.1)


 


Total Bilirubin


 0.2 mg/dL


(0.2-1.0)


 


Aspartate Amino Transf


(AST/SGOT) 11 U/L (15-37) 





 


Alanine Aminotransferase


(ALT/SGPT) 11 U/L (14-59) 





 


Alkaline Phosphatase


 69 U/L


()


 


Total Protein


 7.3 g/dL


(6.4-8.2)


 


Albumin


 2.8 g/dL


(3.4-5.0)


 


Albumin/Globulin Ratio 0.6 (1.0-1.7) 








Medications





Current Medications


Ceftriaxone Sodium (Rocephin) 1 gm 1X  ONCE IVP  Last administered on 8/13/20at 

17:26;  Start 8/13/20 at 17:00;  Stop 8/13/20 at 17:01;  Status DC


Morphine Sulfate (Morphine Sulfate) 2 mg PRN Q2HR  PRN IV PAIN Last administered

on 8/14/20at 12:55;  Start 8/13/20 at 19:30;  Stop 8/15/20 at 05:56;  Status DC


Ceftriaxone Sodium (Rocephin) 1 gm Q24H IVP  Last administered on 8/15/20at 

17:12;  Start 8/14/20 at 17:00


Aspirin (Ecotrin) 325 mg DAILYWBKFT PO  Last administered on 8/16/20at 08:31;  

Start 8/14/20 at 08:00


Carvedilol (Coreg) 6.25 mg BIDWMEALS PO  Last administered on 8/14/20at 17:07;  

Start 8/14/20 at 08:00;  Stop 8/14/20 at 17:18;  Status DC


Vitamin D (Vitamin D3) 2,000 unit DAILY PO  Last administered on 8/16/20at 

08:32;  Start 8/14/20 at 09:00


Citalopram Hydrobromide (CeleXA) 10 mg DAILY PO  Last administered on 8/16/20at 

08:32;  Start 8/14/20 at 09:00


Clopidogrel Bisulfate (Plavix) 75 mg DAILYWBKFT PO  Last administered on 

8/16/20at 08:31;  Start 8/14/20 at 08:00


Cyanocobalamin (Vitamin B-12) 5,000 mcg DAILY PO  Last administered on 8/15/20at

11:18;  Start 8/14/20 at 09:00


Diphenhydramine HCl (Benadryl) 25 mg PRN DAILY  PRN PO ALLERGIES;  Start 8/14/20

at 05:45


Fluticasone Propionate (Flonase) 2 spray PRN DAILY  PRN NS CONGESTION;  Start 

8/14/20 at 05:45


Furosemide (Lasix) 20 mg DAILY PO  Last administered on 8/16/20at 08:33;  Start 

8/14/20 at 09:00


Ipratropium Bromide (Atrovent) 0.2 mg Q8HRS IH ;  Start 8/14/20 at 06:00;  Stop 

8/14/20 at 21:54;  Status DC


Methocarbamol (Robaxin) 500 mg DAILY PO  Last administered on 8/16/20at 08:32;  

Start 8/14/20 at 09:00


Nitroglycerin (Nitrostat) 0.4 mg PRN Q5MIN  PRN SL CHEST PAIN;  Start 8/14/20 at

05:45


Ranolazine (Ranexa) 500 mg BID PO  Last administered on 8/15/20at 21:37;  Start 

8/14/20 at 09:00


Senna/Docusate Sodium (Senna Plus) 1 tab DAILY PO  Last administered on 

8/16/20at 08:32;  Start 8/14/20 at 09:00


Trazodone HCl (Desyrel) 100 mg QHS PO  Last administered on 8/15/20at 21:38;  

Start 8/14/20 at 21:00


Atorvastatin Calcium (Lipitor) 80 mg QHS PO  Last administered on 8/15/20at 

21:37;  Start 8/14/20 at 21:00


Gabapentin (Neurontin) 600 mg TID PO  Last administered on 8/16/20at 08:32;  

Start 8/14/20 at 09:00


Losartan Potassium (Cozaar) 100 mg DAILY PO  Last administered on 8/16/20at 

08:33;  Start 8/14/20 at 09:00


Pantoprazole Sodium (Protonix) 40 mg DAILYAC PO  Last administered on 8/16/20at 

08:31;  Start 8/14/20 at 07:30


Non-Formulary Medication (Rosuvastatin Calcium (Crestor)) 1 tab DAILY PO ;  

Start 8/14/20 at 09:00;  Status UNV


Acetaminophen/ Hydrocodone Bitart (Lortab 5/325) 1 tab PRN Q4HRS  PRN PO 

MODERATE PAIN 4-6 Last administered on 8/16/20at 06:18;  Start 8/14/20 at 05:45


Lactobacillus Rhamnosus (Culturelle) 1 cap BID PO  Last administered on 

8/16/20at 08:32;  Start 8/14/20 at 21:00


Hydralazine HCl (Apresoline Inj) 10 mg PRN Q4HRS  PRN IVP ELEVATED BP, SEE 

COMMENTS;  Start 8/14/20 at 17:30


Carvedilol (Coreg) 12.5 mg BIDWMEALS PO  Last administered on 8/16/20at 08:32;  

Start 8/14/20 at 17:30


Ipratropium Bromide (Atrovent) 0.2 mg PRN Q8HRS  PRN IH SHORTNESS OF BREATH;  

Start 8/14/20 at 22:00


Metronidazole 100 ml @  100 mls/hr Q8HRS IV ;  Start 8/15/20 at 10:00;  Status 

Cancel


Methylprednisolone Sodium Succinate (SOLU-Medrol 125MG VIAL) 125 mg Q8HRS IV  

Last administered on 8/16/20at 06:18;  Start 8/15/20 at 10:00


Clindamycin Phosphate 50 ml @  100 mls/hr Q8HRS IV  Last administered on 

8/16/20at 06:19;  Start 8/15/20 at 10:30





Active Scripts


Active


Aspirin Ec (Aspirin) 325 Mg Tablet.dr 325 Mg PO DAILYWBKFT


Clopidogrel (Clopidogrel Bisulfate) 75 Mg Tablet 75 Mg PO DAILYWBKFT


Reported


Stool Softener Tablet (Sennosides/Docusate Sodium) 1 Each Tablet 1 Each PO DAILY


NITROGLYCERIN SubLingual (Nitroglycerin) 0.4 Mg Tab.subl 0.4 Mg SL PRN Q5MIN PRN


Benadryl (Diphenhydramine Hcl) 25 Mg Capsule 1 Cap PO DAILY PRN 30 Days


Fluticasone Propionate Nasal Spray (Fluticasone Propionate) 16 Gm Spray.susp 2 

Spray NS DAILY PRN


Crestor (Rosuvastatin Calcium) 40 Mg Tablet 1 Tab PO DAILY


Ranexa (Ranolazine) 500 Mg Tab.er.12h 1 Tab PO BID 30 Days


Robaxin-750 (Methocarbamol) 750 Mg Tablet 500 Mg PO DAILY 30 Days


Hydrocodone-Acetamin 5-325 mg (Hydrocodone/Acetaminophen) 1 Each Tablet 1 Each 

PO Q4HRS PRN


Furosemide 20 Mg Tablet 1 Tab PO DAILY


Citalopram Hbr (Citalopram Hydrobromide) 10 Mg Tablet 10 Mg PO DAILY


Losartan Potassium 100 Mg Tablet 100 Mg PO DAILY


Ipratropium Bromide 0.2 Mg/1 Ml Solution 0.2 Mg IH Q8HRS


Spiriva (Tiotropium Bromide) 18 Mcg Cap.w.dev 1 Cap IH DAILY


Carvedilol ** (Carvedilol) 6.25 Mg Tablet 1 Tab PO BID


Vitamin D3 (Cholecalciferol (Vitamin D3)) 1,000 Unit Tablet 2,000 Unit PO DAILY


Vitamin B-12 (Cyanocobalamin (Vitamin B-12)) 1,000 Mcg Tablet 5,000 Mcg PO DAILY


Omeprazole 40 Mg Capsule.dr 40 Mg PO DAILY


Atorvastatin Calcium 80 Mg Tablet 80 Mg PO HS


Trazodone Hcl 100 Mg Tablet 1 Tab PO QHS


Gabapentin 600 Mg Tablet 600 Mg PO TID


Vitals/I & O





Vital Sign - Last 24 Hours








 8/15/20 8/15/20 8/15/20 8/15/20





 11:00 15:27 17:11 19:31


 


Temp 99.0 99.3  98.6





 99.0 99.3  98.6


 


Pulse 69 71 71 70


 


Resp 18 18  16


 


B/P (MAP) 120/53 (75) 104/45 (64) 104/45 122/55 (77)


 


Pulse Ox 96 95  94


 


O2 Delivery Nasal Cannula Nasal Cannula  Nasal Cannula


 


O2 Flow Rate 2.0 2.0  2.0


 


    





    





 8/15/20 8/15/20 8/15/20 8/16/20





 20:20 21:37 22:43 03:19


 


Temp   98.9 97.0





   98.9 97.0


 


Pulse  70 77 61


 


Resp   22 20


 


B/P (MAP)  122/55 112/65 (81) 129/50 (76)


 


Pulse Ox   96 95


 


O2 Delivery Nasal Cannula  Nasal Cannula Nasal Cannula


 


O2 Flow Rate 2.0  2.0 


 


    





    





 8/16/20 8/16/20 8/16/20 8/16/20





 06:18 07:00 08:30 08:32


 


Temp  97.5  





  97.5  


 


Pulse  71  71


 


Resp 20 12  


 


B/P (MAP)  150/65 (93)  150/65


 


Pulse Ox  100  


 


O2 Delivery Nasal Cannula Nasal Cannula Room Air 


 


    





    





 8/16/20   





 08:33   


 


Pulse 71   


 


B/P (MAP) 150/65   














Intake and Output   


 


 8/15/20 8/15/20 8/16/20





 15:00 23:00 07:00


 


Intake Total 560 ml 50 ml 


 


Output Total   0 ml


 


Balance 560 ml 50 ml 0 ml











Justicifation of Admission Dx:


Justifications for Admission:


Justification of Admission Dx:  Yes











ELISHA SCOTT MD          Aug 16, 2020 10:56

## 2020-08-16 NOTE — PDOC
Date of Service:


DATE: 8/16/20 


TIME: 10:12





Subjective:


Subjective:


Overall, patient does feel that pain and swelling has improved.  She does report

feeling slight fullness more superiorly now.   Overall, doing better than 

yesterday.





Objective:


Vital Signs:





Vital Signs








  Date Time  Temp Pulse Resp B/P (MAP) Pulse Ox O2 Delivery O2 Flow Rate FiO2


 


8/16/20 08:33  71  150/65    


 


8/16/20 08:30      Room Air  


 


8/16/20 06:18   20     


 


8/16/20 03:19 97.0    95   





 97.0       


 


8/15/20 22:43       2.0 








Labs:





Laboratory Tests








Test


 8/15/20


10:20 8/16/20


06:25


 


White Blood Count


 7.9 x10^3/uL


(4.0-11.0) 10.1 x10^3/uL


(4.0-11.0)


 


Red Blood Count


 3.44 x10^6/uL


(3.50-5.40) 3.74 x10^6/uL


(3.50-5.40)


 


Hemoglobin


 10.6 g/dL


(12.0-15.5) 11.6 g/dL


(12.0-15.5)


 


Hematocrit


 32.0 %


(36.0-47.0) 34.4 %


(36.0-47.0)


 


Mean Corpuscular Volume 93 fL ()  92 fL () 


 


Mean Corpuscular Hemoglobin 31 pg (25-35)  31 pg (25-35) 


 


Mean Corpuscular Hemoglobin


Concent 33 g/dL


(31-37) 34 g/dL


(31-37)


 


Red Cell Distribution Width


 13.0 %


(11.5-14.5) 12.6 %


(11.5-14.5)


 


Platelet Count


 154 x10^3/uL


(140-400) 168 x10^3/uL


(140-400)


 


Neutrophils (%) (Auto) 59 % (31-73)  86 % (31-73) 


 


Lymphocytes (%) (Auto) 26 % (24-48)  11 % (24-48) 


 


Monocytes (%) (Auto) 12 % (0-9)  3 % (0-9) 


 


Eosinophils (%) (Auto) 2 % (0-3)  0 % (0-3) 


 


Basophils (%) (Auto) 1 % (0-3)  0 % (0-3) 


 


Neutrophils # (Auto)


 4.7 x10^3/uL


(1.8-7.7) 8.7 x10^3/uL


(1.8-7.7)


 


Lymphocytes # (Auto)


 2.1 x10^3/uL


(1.0-4.8) 1.1 x10^3/uL


(1.0-4.8)


 


Monocytes # (Auto)


 1.0 x10^3/uL


(0.0-1.1) 0.3 x10^3/uL


(0.0-1.1)


 


Eosinophils # (Auto)


 0.1 x10^3/uL


(0.0-0.7) 0.0 x10^3/uL


(0.0-0.7)


 


Basophils # (Auto)


 0.1 x10^3/uL


(0.0-0.2) 0.0 x10^3/uL


(0.0-0.2)


 


Sodium Level


 


 139 mmol/L


(136-145)


 


Potassium Level


 


 4.0 mmol/L


(3.5-5.1)


 


Chloride Level


 


 100 mmol/L


()


 


Carbon Dioxide Level


 


 37 mmol/L


(21-32)


 


Anion Gap  2 (6-14) 


 


Blood Urea Nitrogen


 


 24 mg/dL


(7-20)


 


Creatinine


 


 1.2 mg/dL


(0.6-1.0)


 


Estimated GFR


(Cockcroft-Gault) 


 44.0 





 


BUN/Creatinine Ratio  20 (6-20) 


 


Glucose Level


 


 240 mg/dL


(70-99)


 


Calcium Level


 


 8.9 mg/dL


(8.5-10.1)


 


Total Bilirubin


 


 0.2 mg/dL


(0.2-1.0)


 


Aspartate Amino Transf


(AST/SGOT) 


 11 U/L (15-37) 





 


Alanine Aminotransferase


(ALT/SGPT) 


 11 U/L (14-59) 





 


Alkaline Phosphatase


 


 69 U/L


()


 


Total Protein


 


 7.3 g/dL


(6.4-8.2)


 


Albumin


 


 2.8 g/dL


(3.4-5.0)


 


Albumin/Globulin Ratio  0.6 (1.0-1.7) 











Physical Exam:


Physical Exam:


GEN:  NAD


HEENT:  Oral Cavity-- increased discharge, slightly purulent from left Stensen's

duct, no bertha stone;  Left neck:  Parotid edema/induration has improved, 

especially anteriorly.  Posterior induration still present.  


CV:  S1S2 without murmurs, rubs, or gallops


RESP:  CTAB without wheezing, rhonchi, or crackles


EXT:  No edema


NEURO:  AAO x 3





Assessment & Plan:


Assessment :


75 year old female with left parotid sialoadenitis that was refractory to oral 

antibiotics.   Patient was admitted on 8/13 for IV antibiotics.  Minimal 

improvement thus far with 48 hours of ceftriaxone   CT imaging done 8/13 at 

[facility] did not show any abscess or other etiology that would require 

surgery at this time.   Patient physical exam has improved over past 24 hours 

with addition of IV clindamycin and solumedrol.


Plan:





-  Discussed sialogogues with patient and family member.  Patient to continue to

use  


-  Encouraged adequate hydration.  


-  continue warm compresses to area 


-  Continue ceftriaxone and IV clindamycin


-  Continue solumedrol IV to help relieve swelling-- although this addition will

not allow us to monitor wbc


-  I don't feel that CT imaging required at this time.   If continues to 

clinically improve over next 24 hours, we can consider hospital discharge 

tomorrow or Tuesday with oral antibiotics.





Justicifation of Admission Dx:


Justifications for Admission:


Justification of Admission Dx:  Yes











KYLE HARRIS MD           Aug 16, 2020 10:15

## 2020-08-17 VITALS — DIASTOLIC BLOOD PRESSURE: 60 MMHG | SYSTOLIC BLOOD PRESSURE: 138 MMHG

## 2020-08-17 VITALS — DIASTOLIC BLOOD PRESSURE: 61 MMHG | SYSTOLIC BLOOD PRESSURE: 138 MMHG

## 2020-08-17 VITALS
DIASTOLIC BLOOD PRESSURE: 65 MMHG | SYSTOLIC BLOOD PRESSURE: 158 MMHG | SYSTOLIC BLOOD PRESSURE: 158 MMHG | SYSTOLIC BLOOD PRESSURE: 158 MMHG | DIASTOLIC BLOOD PRESSURE: 65 MMHG | DIASTOLIC BLOOD PRESSURE: 65 MMHG

## 2020-08-17 RX ADMIN — RANOLAZINE SCH MG: 500 TABLET, FILM COATED, EXTENDED RELEASE ORAL at 09:26

## 2020-08-17 RX ADMIN — GABAPENTIN SCH MG: 300 CAPSULE ORAL at 09:26

## 2020-08-17 RX ADMIN — CYANOCOBALAMIN TAB 1000 MCG SCH MCG: 1000 TAB at 09:26

## 2020-08-17 RX ADMIN — CITALOPRAM HYDROBROMIDE SCH MG: 10 TABLET ORAL at 09:25

## 2020-08-17 RX ADMIN — VITAMIN D, TAB 1000IU (100/BT) SCH UNIT: 25 TAB at 09:26

## 2020-08-17 RX ADMIN — CLOPIDOGREL BISULFATE SCH MG: 75 TABLET ORAL at 09:25

## 2020-08-17 RX ADMIN — Medication SCH CAP: at 09:24

## 2020-08-17 RX ADMIN — GABAPENTIN SCH MG: 300 CAPSULE ORAL at 13:05

## 2020-08-17 RX ADMIN — CLINDAMYCIN PHOSPHATE SCH MLS/HR: 18 INJECTION, SOLUTION INTRAVENOUS at 06:06

## 2020-08-17 RX ADMIN — HYDROCODONE BITARTRATE AND ACETAMINOPHEN PRN TAB: 5; 325 TABLET ORAL at 00:09

## 2020-08-17 RX ADMIN — LOSARTAN POTASSIUM SCH MG: 50 TABLET ORAL at 09:25

## 2020-08-17 RX ADMIN — CARVEDILOL SCH MG: 12.5 TABLET, FILM COATED ORAL at 09:26

## 2020-08-17 RX ADMIN — ASPIRIN SCH MG: 325 TABLET, DELAYED RELEASE ORAL at 09:25

## 2020-08-17 RX ADMIN — METHYLPREDNISOLONE SODIUM SUCCINATE SCH MG: 125 INJECTION, POWDER, FOR SOLUTION INTRAMUSCULAR; INTRAVENOUS at 06:06

## 2020-08-17 RX ADMIN — FUROSEMIDE SCH MG: 20 TABLET ORAL at 09:25

## 2020-08-17 RX ADMIN — SENNOSIDES AND DOCUSATE SODIUM SCH TAB: 8.6; 5 TABLET ORAL at 09:25

## 2020-08-17 RX ADMIN — CLINDAMYCIN PHOSPHATE SCH MLS/HR: 18 INJECTION, SOLUTION INTRAVENOUS at 13:05

## 2020-08-17 RX ADMIN — METHYLPREDNISOLONE SODIUM SUCCINATE SCH MG: 125 INJECTION, POWDER, FOR SOLUTION INTRAMUSCULAR; INTRAVENOUS at 13:05

## 2020-08-17 RX ADMIN — METHOCARBAMOL SCH MG: 500 TABLET ORAL at 09:25

## 2020-08-17 RX ADMIN — PANTOPRAZOLE SODIUM SCH MG: 40 TABLET, DELAYED RELEASE ORAL at 09:25

## 2020-08-17 NOTE — NUR
SW following. Discussed with RN, pt from home with sister. RN advised no SW needs, 
anticipate discharge home today.

## 2020-08-17 NOTE — PDOC
[facility] PROGRESS NOTE


Date of Service


DOS:


DATE: 8/17/20 


TIME: 09:51





Chief Complaint


Chief Complaint


A/P:


Parotid swelling, infection.  


morbid obesity


Hyperglycemia





admitted.  





consult Perla Castillo MD, ENT 


p.r.n. morphine, IV


ceftriaxone 1 gram every day.  


Home meds, 


DVT prophylaxis.  


Full code.


continued ceftriaxone and added clindamycin to regimen to cover anaerobes and 

aerobes





Consider hospital discharge tomorrow or Tuesday with oral antibiotics.





 








D/W RN





History of Present Illness


History of Present Illness


Ms Bran is a 75-year-old female who saw Dr. Perla Castillo of the ENT service 

and was told to go to the ER.  Admitted the patient with infection to her 

parotid gland.





Solumedrol, clindamycin, and ceftriaxone improving parotid swelling. Counseled 

on use of sialogues. She is feeling much improved and requesting discharge home 

today.





Vitals/I&O


Vitals/I&O:





                                   Vital Signs








  Date Time  Temp Pulse Resp B/P (MAP) Pulse Ox O2 Delivery O2 Flow Rate FiO2


 


8/17/20 09:26  67  138/60    


 


8/17/20 07:00 97.7  18  94 Room Air  





 97.7       


 


8/17/20 00:09       2.0 














                                    I & O   


 


 8/16/20 8/16/20 8/17/20





 15:00 23:00 07:00


 


Intake Total 410 ml 50 ml 


 


Output Total   0 ml


 


Balance 410 ml 50 ml 0 ml











Physical Exam


Physical Exam:


HEENT:  The left PAROTID  is swollen., TENDERNESS SLOW TO RESOLVE , BUT 

IMPROVING 


EYES:  Extraocular muscles are intact, pupils are equally round and reactive to


light and accommodation


MUSCULOSKELETAL:  Well developed, well nourished, good range of motion


ENDOCRINE:  No thyromegaly was palpated


LYMPHATICS:  No cervical chain or axillary nodes were noted


HEMATOPOIETIC:  No bruising


NECK:  Supple, no JVD, no thyromegaly was noted.


LUNGS:  Clear to auscultation in all lung fields without rhonchi or wheezing.


HEART:  RRR, S1, S2 present.  Peripheral pulses intact, no obvious murmurs were


noted.


ABDOMEN:  Soft, nontender.  Positive bowel sounds no organomegaly, normal bowel


sounds.


EXTREMITIES:  Without any cyanosis, clubbing, or edema.  Pedal pulses intact,


Homans sign is negative.


NEUROLOGIC:  Normal speech, normal tone.  A & O x3, moves all extremities, no


obvious focal deficits.


PSYCHIATRIC:  Normal affect, normal mood.  Stable.


SKIN:  No ulcerations or rashes, good skin turgor, no jaundice.


VASCULAR:  Good capillary refill, neurovascular bundle appears to be intact.


General:  Alert, Oriented X3, Cooperative, No acute distress


Heart:  Regular rate, Normal S1


Lungs:  Clear


Abdomen:  Normal bowel sounds, Soft, No tenderness


Extremities:  No clubbing, No cyanosis, No edema


Skin:  No rashes





Assessment and Plan


Assessmemt and Plan


Problems


Medical Problems:


(1) Parotitis not due to mumps


Status: Acute  











Comment


Review of Relevant


I have reviewed the following items irma (where applicable) has been applied.





Justicifation of Admission Dx:


Justifications for Admission:


Justification of Admission Dx:  Yes











PRINCESS CALDWELL MD        Aug 17, 2020 09:52

## 2020-08-17 NOTE — NUR
Discharge Note: PT DISCHARGED HOME WITH SELF CARE. PT LEFT FACILITY VIA PRIVATE VEHICLE WITH 
DAUGHTER. PT STABLE AND ALERT UPON DISCHARGE. PT PIV REMOVED FROM R FA WITHOUT 
COMPLICATIONS, BANDAGE APPLIED. PT EDUCATED ABOUT DISCHARGE INSTRUCTIONS, DISCHARGE 
MEDICATIONS, AND FOLLOW-UP CARE. PT INFORMED THAT DR. HARRIS (ENT) WOULD BE IN CONTACT WITH 
HER TO SCHEDULE A FOLLOW-UP APPOINTMENT BY FRIDAY. NO CONCERNS VOICED AT THIS TIME. PT LEFT 
WITH ALL PERSONAL BELONGINGS. 



GERRY BALBUENA



Discharge instructions and discharge home medications reviewed with Patient and a copy 
given. All questions have been answered and understanding verbalized.

## 2020-08-17 NOTE — PDOC3
Discharge Summary


Visit Information


Date of Admission:  Aug 14, 2020


Date of Discharge:  Aug 17, 2020


Admitting Diagnosis:  Parotitits, left > right


Final Diagnosis


Problems


Medical Problems:


(1) Parotitis not due to mumps


Status: Acute  











Brief Hospital Course


Allergies





                                    Allergies








Coded Allergies Type Severity Reaction Last Updated Verified


 


  ampicillin Allergy Intermediate  3/16/16 Yes


 


  cyclobenzaprine Allergy Intermediate  3/16/16 Yes


 


  dexamethasone Allergy Intermediate  8/14/20 Yes


 


  latex Allergy Intermediate  3/16/16 Yes


 


  pravastatin Allergy Intermediate  1/25/18 Yes


 


  albuterol Allergy Mild  8/13/20 Yes


 


  morphine Adverse Reaction Intermediate  8/15/20 Yes








Vital Signs





Vital Signs








  Date Time  Temp Pulse Resp B/P (MAP) Pulse Ox O2 Delivery O2 Flow Rate FiO2


 


8/17/20 11:00 98.1 73 18 158/65 (96) 92 Room Air  





 98.1       


 


8/17/20 08:00       2.0 








Lab Results





Laboratory Tests








Test


 8/16/20


06:25


 


White Blood Count


 10.1 x10^3/uL


(4.0-11.0)


 


Red Blood Count


 3.74 x10^6/uL


(3.50-5.40)


 


Hemoglobin


 11.6 g/dL


(12.0-15.5)


 


Hematocrit


 34.4 %


(36.0-47.0)


 


Mean Corpuscular Volume 92 fL () 


 


Mean Corpuscular Hemoglobin 31 pg (25-35) 


 


Mean Corpuscular Hemoglobin


Concent 34 g/dL


(31-37)


 


Red Cell Distribution Width


 12.6 %


(11.5-14.5)


 


Platelet Count


 168 x10^3/uL


(140-400)


 


Neutrophils (%) (Auto) 86 % (31-73) 


 


Lymphocytes (%) (Auto) 11 % (24-48) 


 


Monocytes (%) (Auto) 3 % (0-9) 


 


Eosinophils (%) (Auto) 0 % (0-3) 


 


Basophils (%) (Auto) 0 % (0-3) 


 


Neutrophils # (Auto)


 8.7 x10^3/uL


(1.8-7.7)


 


Lymphocytes # (Auto)


 1.1 x10^3/uL


(1.0-4.8)


 


Monocytes # (Auto)


 0.3 x10^3/uL


(0.0-1.1)


 


Eosinophils # (Auto)


 0.0 x10^3/uL


(0.0-0.7)


 


Basophils # (Auto)


 0.0 x10^3/uL


(0.0-0.2)


 


Segmented Neutrophils % 61 % (35-66) 


 


Lymphocytes % 27 % (24-48) 


 


Monocytes % 8 % (0-10) 


 


Eosinophils % 4 % (0-5) 


 


Platelet Estimate


 Adequate


(ADEQUATE)


 


Large Platelets Present 


 


Sodium Level


 139 mmol/L


(136-145)


 


Potassium Level


 4.0 mmol/L


(3.5-5.1)


 


Chloride Level


 100 mmol/L


()


 


Carbon Dioxide Level


 37 mmol/L


(21-32)


 


Anion Gap 2 (6-14) 


 


Blood Urea Nitrogen


 24 mg/dL


(7-20)


 


Creatinine


 1.2 mg/dL


(0.6-1.0)


 


Estimated GFR


(Cockcroft-Gault) 44.0 





 


BUN/Creatinine Ratio 20 (6-20) 


 


Glucose Level


 240 mg/dL


(70-99)


 


Calcium Level


 8.9 mg/dL


(8.5-10.1)


 


Total Bilirubin


 0.2 mg/dL


(0.2-1.0)


 


Aspartate Amino Transf


(AST/SGOT) 11 U/L (15-37) 





 


Alanine Aminotransferase


(ALT/SGPT) 11 U/L (14-59) 





 


Alkaline Phosphatase


 69 U/L


()


 


Total Protein


 7.3 g/dL


(6.4-8.2)


 


Albumin


 2.8 g/dL


(3.4-5.0)


 


Albumin/Globulin Ratio 0.6 (1.0-1.7) 








Brief Hospital Course


Ms Bran is a 75-year-old female who saw Dr. Perla Castillo of the ENT service 

and was told to go to the ER.  Admitted the patient with infection to her 

parotid gland.


8/15-8/16: continued ceftriaxone and added clindamycin to regimen to cover 

anaerobes and aerobes





Solumedrol, clindamycin, and ceftriaxone improving parotid swelling. Counseled 

on use of sialogues. She is feeling much improved and requesting discharge home 

today.





Problem list:


Parotid swelling, infection.  


morbid obesity


Hyperglycemia





Consults: Perla Castillo MD, ENT 





Greater than 30 minutes spent on d/c on oral cefdinir, clindamycin, and 

prednisone with f/u with ENT outpatient.





Discharge Information


Condition at Discharge:  Improved


Follow Up:  Weeks (1)


Disposition/Orders:  D/C to Home


Scheduled


Aspirin (Aspirin Ec) 325 Mg Tablet.dr, 325 MG PO DAILYWBKFT, #90


   Prescribed by: JAMMIE MEI on 9/18/18 1010


   Last Action: Continued on 8/14/20 0551 by SUZANNE HERRERA


Atorvastatin Calcium (Atorvastatin Calcium) 80 Mg Tablet, 80 MG PO HS for FOR 

CHOLESTEROL, #30 Ref 0 (Reported)


   Entered as Reported by: KIEL MILLER on 1/25/18 1138


   Last Action: Converted on 8/14/20 0551 by SUZANNE HERRERA


Carvedilol (Carvedilol **) 6.25 Mg Tablet, 1 TAB PO BID, #180 Ref 1 (Reported)


   Entered as Reported by: KIEL MILLER on 1/25/18 1138


   Last Action: Continued on 8/14/20 0551 by SUZANNE HERRERA


Cefdinir (Cefdinir) 300 Mg Capsule, 1 CAP PO BID for Parotitis for 7 Days, #14


   Prescribed by: PRINCESS CALDWELL MD on 8/17/20 1228


Cholecalciferol (Vitamin D3) (Vitamin D3) 1,000 Unit Tablet, 2,000 UNIT PO 

DAILY, (Reported)


   Entered as Reported by: KIEL MILLER on 1/25/18 1138


   Last Action: Continued on 8/14/20 0551 by SUZANNE HERRERA


Citalopram Hydrobromide (Citalopram Hbr) 10 Mg Tablet, 10 MG PO DAILY, 

(Reported)


   Entered as Reported by: MIKKI HERNANDEZ on 9/13/18 1736


   Last Action: Continued on 8/14/20 0551 by SUZANNE HERRERA


Clindamycin Hcl (Clindamycin Hcl) 300 Mg Capsule, 1 CAP PO TID for parotitis for

7 Days, #21


   Prescribed by: PRINCESS CALDWELL MD on 8/17/20 1228


Clopidogrel Bisulfate (Clopidogrel) 75 Mg Tablet, 75 MG PO DAILYWBKFT, #90 Ref 0


   Prescribed by: JAMMIE MEI on 9/18/18 1010


   Last Action: Continued on 8/14/20 0551 by SUZANNE HERRERA


Cyanocobalamin (Vitamin B-12) (Vitamin B-12) 1,000 Mcg Tablet, 5,000 MCG PO 

DAILY, (Reported)


   Entered as Reported by: KIEL MILLER on 1/25/18 1138


   Last Action: Continued on 8/14/20 0551 by SUZANNE HERRERA


Furosemide (Furosemide) 20 Mg Tablet, 1 TAB PO DAILY for SWELLING, #90 Ref 1 

(Reported)


   Entered as Reported by: SUZANNE HERRERA on 8/13/20 2101


   Last Action: Continued on 8/14/20 0551 by SUZANNE HERRERA


Gabapentin (Gabapentin) 600 Mg Tablet, 600 MG PO TID, (Reported)


   Entered as Reported by: RHYS PADRON on 3/16/16 1224


   Last Action: Converted on 8/14/20 0551 by SUZANNE HERRERA


Ipratropium Bromide (Ipratropium Bromide) 0.2 Mg/1 Ml Solution, 0.2 MG IH Q8HRS,

(Reported)


   Entered as Reported by: MIKKI HERNANDEZ on 9/13/18 1736


   Last Action: Continued on 8/14/20 0551 by SUZANNE HERRERA


Losartan Potassium (Losartan Potassium) 100 Mg Tablet, 100 MG PO DAILY, (R

eported)


   Entered as Reported by: MIKKI HERNANDEZ on 9/13/18 1736


   Last Action: Converted on 8/14/20 0551 by SUZANNE HERRERA


Methocarbamol (Robaxin-750) 750 Mg Tablet, 500 MG PO DAILY for MUSCLE RELAXER 

for 30 Days, #20 Ref 0 (Reported)


   Entered as Reported by: SUZANNE HERRERA on 8/13/20 2101


   Last Action: Continued on 8/14/20 0551 by SUZANNE HERRERA


Omeprazole (Omeprazole) 40 Mg Capsule.dr, 40 MG PO DAILY, (Reported)


   Entered as Reported by: KIEL MILLER on 1/25/18 1138


   Last Action: Converted on 8/14/20 0551 by SUZANNE HERRERA


Prednisone (Prednisone) 20 Mg Tablet, 1 TAB PO DAILY for Parotitis for 5 Days, 

#5


   Prescribed by: PRINCESS CALDWELL MD on 8/17/20 1228


Ranolazine (Ranexa) 500 Mg Tab.er.12h, 1 TAB PO BID for htn for 30 Days, #60 Ref

0 (Reported)


   Entered as Reported by: SUZANNE HERRERA on 8/13/20 2101


   Last Action: Continued on 8/14/20 0551 by SUZANNE HERRERA


Rosuvastatin Calcium (Crestor) 40 Mg Tablet, 1 TAB PO DAILY for CHOLESTEROL, #30

Ref 5 (Reported)


   Entered as Reported by: SUZANNE HERRERA on 8/13/20 2101


   Last Action: Converted on 8/14/20 0551 by SUZANNE HERRERA


Sennosides/Docusate Sodium (Stool Softener Tablet) 1 Each Tablet, 1 EACH PO 

DAILY for CONSTIPATION, (Reported)


   Entered as Reported by: SUZANNE HERRERA on 8/13/20 2101


   Last Action: Continued on 8/14/20 0551 by SUZANNE HERRERA


Tiotropium Bromide (Spiriva) 18 Mcg Cap.w.dev, 1 CAP IH DAILY, #30 Ref 3 

(Reported)


   Entered as Reported by: MIKKI HERNANDEZ on 9/13/18 1731


Trazodone Hcl (Trazodone Hcl) 100 Mg Tablet, 1 TAB PO QHS, #30 Ref 1 (Reported)


   Entered as Reported by: RHYS PADRON on 3/16/16 1224


   Last Action: Continued on 8/14/20 0551 by SUZANNE HERRERA





Scheduled PRN


Diphenhydramine Hcl (Benadryl) 25 Mg Capsule, 1 CAP PO DAILY PRN for ALLERGIES 

for 30 Days, #30 Ref 0 (Reported)


   Entered as Reported by: SUZANNE HERRERA on 8/13/20 2101


   Last Action: Continued on 8/14/20 0551 by SUZANNE HERRERA


Fluticasone Propionate (Fluticasone Propionate Nasal Spray) 16 Gm [place].susp, 2 

SPRAY NS DAILY PRN for CONGESTION, #1 Ref 11 (Reported)


   Entered as Reported by: SUZANNE HERRERA on 8/13/20 2101


   Last Action: Continued on 8/14/20 0551 by SUZANNE HERRERA


Hydrocodone/Acetaminophen (Hydrocodone-Acetamin 5-325 mg) 1 Each Tablet, 1 EACH 

PO PRN Q6HRS PRN for PAIN for 6 Days, #15


   Prescribed by: PRINCESS CALDWELL MD on 8/17/20 1229


Nitroglycerin (NITROGLYCERIN SubLingual) 0.4 Mg Tab.subl, 0.4 MG SL PRN Q5MIN 

PRN for CHEST PAIN, (Reported)


   Entered as Reported by: SUZANNE HERRERA on 8/13/20 2101


   Last Action: Continued on 8/14/20 0551 by SUZANNE HERRERA





Justicifation of Admission Dx:


Justifications for Admission:


Justification of Admission Dx:  Yes











PRINCESS CALDWELL MD        Aug 17, 2020 23:01

## 2020-12-10 ENCOUNTER — HOSPITAL ENCOUNTER (OUTPATIENT)
Dept: HOSPITAL 61 - RT | Age: 75
End: 2020-12-10
Attending: NURSE PRACTITIONER
Payer: MEDICARE

## 2020-12-10 DIAGNOSIS — R25.1: Primary | ICD-10-CM

## 2020-12-10 PROCEDURE — 95816 EEG AWAKE AND DROWSY: CPT

## 2020-12-11 NOTE — EEG
DATE OF SERVICE:  12/10/2020



ELECTROENCEPHALOGRAM REPORT



EEG NUMBER:  [id number].



OBJECTIVE:  The patient is a 75-year-old female with episodes of trembling, rule

out seizures.



DESCRIPTION:  This is a digital study.  Electrodes are placed according to the

international 10-20 system.  Bipolar and referential montages are available. 

Activation procedures typically include hyperventilation and intermittent photic

stimulation.



INTERPRETATION:  The waking background consists of 8-9 Hz,  microvolt

activity, symmetrically distributed over parietooccipital regions and reactive

to eye opening.  Hyperventilation and intermittent photic stimulation are

noncontributory.  Stage 1 sleep is achieved with normal electroencephalogram

patterns.



IMPRESSION:  This electroencephalogram with the patient awake and asleep is

within normal limits.  There is no focal, paroxysmal, or epileptiform activity.



Thank you for letting us help with the patient's care.

 



______________________________

ZENA MONTES MD



DR:  JENS/[email or web address]  JOB#:  [id number] / [id number]

DD:  12/11/2020 08:20  DT:  12/11/2020 08:30



MARY Gamboa

## 2021-12-03 ENCOUNTER — HOSPITAL ENCOUNTER (OUTPATIENT)
Dept: HOSPITAL 61 - KCIC MRI | Age: 76
End: 2021-12-03
Payer: MEDICARE

## 2021-12-03 DIAGNOSIS — M19.011: Primary | ICD-10-CM

## 2021-12-03 DIAGNOSIS — M75.51: ICD-10-CM

## 2021-12-03 DIAGNOSIS — M75.81: ICD-10-CM

## 2021-12-03 PROCEDURE — 73221 MRI JOINT UPR EXTREM W/O DYE: CPT

## 2021-12-06 NOTE — KCIC
EXAM: MRI RIGHT SHOULDER WITHOUT  CONTRAST



INDICATION: Right shoulder pain, fell in July. Limited range of motion.



COMPARISON: None



TECHNIQUE: Multiplanar, multisequence imaging of the right shoulder without contrast.



FINDINGS: 

ROTATOR CUFF: Mild tendinopathy and bursal sided fraying of the anterior mid supraspinatus tendon at 
the footprint. No discrete rotator cuff tear. Infraspinatus, subscapularis, and teres minor tendons a
re intact. No rotator cuff muscle atrophy or edema..



LABRUM: There is degenerative superior labral tearing.



BICEPS TENDON: The biceps tendon is intact and located.



ACROMIOCLAVICULAR JOINT: Mild acromioclavicular degenerative joint disease. Type II acromion with lat
eral downsloping and a small subacromial enthesophyte.



GLENOHUMERAL JOINT: Articular cartilage is intact. No acute fracture or marrow signal abnormality. Al
ignment is normal.



OTHER: No joint effusion. Mild subacromial-subdeltoid bursitis.



IMPRESSION:

1. Mild tendinopathy and bursal sided fraying of the supraspinatus tendon. No full-thickness rotator 
cuff tear.

2. Subacromial-subdeltoid bursitis.

3. Mild acromioclavicular degenerative joint disease with a lateral downsloping acromion and small sky
bacromial enthesophyte.



Electronically signed by: Cyndie Bird MD (12/6/2021 9:18 AM) [id number]